# Patient Record
Sex: FEMALE | Race: OTHER | Employment: FULL TIME | URBAN - METROPOLITAN AREA
[De-identification: names, ages, dates, MRNs, and addresses within clinical notes are randomized per-mention and may not be internally consistent; named-entity substitution may affect disease eponyms.]

---

## 2017-01-06 PROCEDURE — 80050 GENERAL HEALTH PANEL: CPT | Performed by: INTERNAL MEDICINE

## 2017-01-06 PROCEDURE — 87536 HIV-1 QUANT&REVRSE TRNSCRPJ: CPT | Performed by: INTERNAL MEDICINE

## 2017-01-06 PROCEDURE — 86360 T CELL ABSOLUTE COUNT/RATIO: CPT | Performed by: INTERNAL MEDICINE

## 2017-01-06 PROCEDURE — 36415 COLL VENOUS BLD VENIPUNCTURE: CPT | Performed by: INTERNAL MEDICINE

## 2017-02-17 ENCOUNTER — TELEPHONE (OUTPATIENT)
Dept: ALLERGY | Facility: CLINIC | Age: 67
End: 2017-02-17

## 2017-02-17 DIAGNOSIS — E03.9 HYPOTHYROIDISM, UNSPECIFIED TYPE: Primary | ICD-10-CM

## 2017-02-17 RX ORDER — LEVOTHYROXINE SODIUM 0.07 MG/1
TABLET ORAL
Qty: 90 TABLET | Refills: 3 | Status: SHIPPED | OUTPATIENT
Start: 2017-02-17 | End: 2017-03-24

## 2017-02-18 RX ORDER — VERAPAMIL HCL 80 MG
TABLET ORAL
Qty: 1 INHALER | Refills: 2 | Status: SHIPPED | OUTPATIENT
Start: 2017-02-18 | End: 2017-07-15

## 2017-02-18 NOTE — TELEPHONE ENCOUNTER
Spoke with patient, reports does not have a printer, so would like to  coupon card. I am leaving it up at  for her, she is planning to pick it up on 2/20/17 before 7pm.   No further questions or concerns at this time.

## 2017-02-18 NOTE — TELEPHONE ENCOUNTER
Refill requested for:    Beclomethasone Dipropionate (QVAR) 80 MCG/ACT Inhalation Aero Soln 1 Inhaler 2 8/6/2016       Sig :  Inhale 2 puffs (0.16 mg total) into the lungs daily.       Patient taking differently :  Inhale 2 puffs into the lungs 2 (two) tejas

## 2017-02-18 NOTE — TELEPHONE ENCOUNTER
Informed patient that her medication was refilled x 3 months and sent to pharmacy. Scheduled for 4/29/17 @ 10:45 AM. She verbalized understanding.

## 2017-03-24 DIAGNOSIS — E03.9 HYPOTHYROIDISM, UNSPECIFIED TYPE: ICD-10-CM

## 2017-03-24 RX ORDER — LEVOTHYROXINE SODIUM 0.07 MG/1
75 TABLET ORAL
Qty: 90 TABLET | Refills: 3 | Status: SHIPPED | OUTPATIENT
Start: 2017-03-24 | End: 2018-03-10

## 2017-03-24 NOTE — TELEPHONE ENCOUNTER
From: Sharri Barker  To:  Faby Urena MD  Sent: 3/24/2017 3:19 PM CDT  Subject: Medication Renewal Request    Original authorizing provider: MD Sharri Amaya would like a refill of the following medications:  LEVOTHYROXINE SODIUM

## 2017-06-19 ENCOUNTER — TELEPHONE (OUTPATIENT)
Dept: ALLERGY | Facility: CLINIC | Age: 67
End: 2017-06-19

## 2017-06-19 NOTE — TELEPHONE ENCOUNTER
Call reviewed and noted. Please have patient try hydrocortisone twice a day over the next 1 week. Continue with Xyzal on a daily basis and Benadryl as needed. If no improvement over the next week or if worsening then recommend to be seen by a physician.

## 2017-06-19 NOTE — TELEPHONE ENCOUNTER
General Telephone Call to Triage:    Last office visit (Rafa Hernandez):  11/16/16. Due for f/u:  Please clarify.     _____________________________________________________________________    ACTION:  According to previous testing, patient with known allergies to mold

## 2017-06-19 NOTE — TELEPHONE ENCOUNTER
Pt states that she has itchy hives on her fore arm. Pt states that they started around a week ago and would like to know what doctor recommends.

## 2017-06-19 NOTE — TELEPHONE ENCOUNTER
Patient returned call, c/o itchy rash to elbow crease area of L arm. It does not extend beyond that point. The only trigger she can identify as a possibility is when she was carrying a reusable shopping bag in that spot of her arm.   This started 1 week a

## 2017-06-20 NOTE — TELEPHONE ENCOUNTER
Left detailed message, as requested by patient earlier today. I notified her of Dr. Kerrie Zheng message as written below. Please call with any further questions or concerns.

## 2017-06-28 ENCOUNTER — PATIENT MESSAGE (OUTPATIENT)
Dept: INTERNAL MEDICINE CLINIC | Facility: CLINIC | Age: 67
End: 2017-06-28

## 2017-06-28 NOTE — TELEPHONE ENCOUNTER
From: Az Moran  To: Pancho Merchant MD  Sent: 6/28/2017 9:37 AM CDT  Subject: Other    would like to make an appointment with Dr Slade Smith.  Hopefully this Saturday

## 2017-07-11 ENCOUNTER — LAB ENCOUNTER (OUTPATIENT)
Dept: LAB | Facility: HOSPITAL | Age: 67
End: 2017-07-11
Attending: INTERNAL MEDICINE
Payer: MEDICARE

## 2017-07-11 DIAGNOSIS — Z23 NEED FOR PROPHYLACTIC VACCINATION AND INOCULATION AGAINST VIRAL HEPATITIS: ICD-10-CM

## 2017-07-11 DIAGNOSIS — E03.9 HYPOTHYROIDISM, UNSPECIFIED TYPE: ICD-10-CM

## 2017-07-11 DIAGNOSIS — B20 HIV DISEASE (HCC): ICD-10-CM

## 2017-07-11 DIAGNOSIS — E78.5 HYPERLIPIDEMIA, UNSPECIFIED HYPERLIPIDEMIA TYPE: ICD-10-CM

## 2017-07-11 LAB
ALBUMIN SERPL BCP-MCNC: 4.1 G/DL (ref 3.5–4.8)
ALBUMIN/GLOB SERPL: 1.6 {RATIO} (ref 1–2)
ALP SERPL-CCNC: 77 U/L (ref 32–100)
ALT SERPL-CCNC: 17 U/L (ref 14–54)
ANION GAP SERPL CALC-SCNC: 5 MMOL/L (ref 0–18)
AST SERPL-CCNC: 18 U/L (ref 15–41)
BASOPHILS # BLD: 0 K/UL (ref 0–0.2)
BASOPHILS NFR BLD: 1 %
BILIRUB SERPL-MCNC: 0.6 MG/DL (ref 0.3–1.2)
BUN SERPL-MCNC: 11 MG/DL (ref 8–20)
BUN/CREAT SERPL: 17.7 (ref 10–20)
CALCIUM SERPL-MCNC: 9.5 MG/DL (ref 8.5–10.5)
CD3+CD4+ CELLS # BLD: 240 /MM3
CD3+CD4+ CELLS NFR BLD: 24 %
CD3+CD4+ CELLS/CD3+CD8+ CLL SPEC: 0.5
CD3+CD8+ CELLS NFR SPEC: 45 %
CHLORIDE SERPL-SCNC: 107 MMOL/L (ref 95–110)
CHOLEST SERPL-MCNC: 232 MG/DL (ref 110–200)
CO2 SERPL-SCNC: 30 MMOL/L (ref 22–32)
CREAT SERPL-MCNC: 0.62 MG/DL (ref 0.5–1.5)
EOSINOPHIL # BLD: 0 K/UL (ref 0–0.7)
EOSINOPHIL NFR BLD: 1 %
ERYTHROCYTE [DISTWIDTH] IN BLOOD BY AUTOMATED COUNT: 13 % (ref 11–15)
GLOBULIN PLAS-MCNC: 2.5 G/DL (ref 2.5–3.7)
GLUCOSE SERPL-MCNC: 87 MG/DL (ref 70–99)
HCT VFR BLD AUTO: 40.7 % (ref 35–48)
HDLC SERPL-MCNC: 61 MG/DL
HGB BLD-MCNC: 13.8 G/DL (ref 12–16)
LDLC SERPL CALC-MCNC: 147 MG/DL (ref 0–99)
LYMPHOCYTES # BLD: 1 K/UL (ref 1–4)
LYMPHOCYTES NFR BLD: 25 %
MCH RBC QN AUTO: 29.9 PG (ref 27–32)
MCHC RBC AUTO-ENTMCNC: 34 G/DL (ref 32–37)
MCV RBC AUTO: 87.8 FL (ref 80–100)
MONOCYTES # BLD: 0.4 K/UL (ref 0–1)
MONOCYTES NFR BLD: 10 %
NEUTROPHILS # BLD AUTO: 2.5 K/UL (ref 1.8–7.7)
NEUTROPHILS NFR BLD: 63 %
NONHDLC SERPL-MCNC: 171 MG/DL
OSMOLALITY UR CALC.SUM OF ELEC: 293 MOSM/KG (ref 275–295)
PLATELET # BLD AUTO: 171 K/UL (ref 140–400)
PMV BLD AUTO: 9.4 FL (ref 7.4–10.3)
POTASSIUM SERPL-SCNC: 4.2 MMOL/L (ref 3.3–5.1)
PROT SERPL-MCNC: 6.6 G/DL (ref 5.9–8.4)
RBC # BLD AUTO: 4.64 M/UL (ref 3.7–5.4)
SODIUM SERPL-SCNC: 142 MMOL/L (ref 136–144)
TRIGL SERPL-MCNC: 122 MG/DL (ref 1–149)
TSH SERPL-ACNC: 1.55 UIU/ML (ref 0.45–5.33)
WBC # BLD AUTO: 4 K/UL (ref 4–11)

## 2017-07-11 PROCEDURE — 80061 LIPID PANEL: CPT

## 2017-07-11 PROCEDURE — 36415 COLL VENOUS BLD VENIPUNCTURE: CPT

## 2017-07-11 PROCEDURE — 85025 COMPLETE CBC W/AUTO DIFF WBC: CPT

## 2017-07-11 PROCEDURE — 84443 ASSAY THYROID STIM HORMONE: CPT

## 2017-07-11 PROCEDURE — 87536 HIV-1 QUANT&REVRSE TRNSCRPJ: CPT

## 2017-07-11 PROCEDURE — 80053 COMPREHEN METABOLIC PANEL: CPT

## 2017-07-11 PROCEDURE — 86780 TREPONEMA PALLIDUM: CPT

## 2017-07-11 PROCEDURE — 86360 T CELL ABSOLUTE COUNT/RATIO: CPT

## 2017-07-12 LAB — T PALLIDUM AB SER QL: NEGATIVE

## 2017-07-13 LAB
HIV-1 QNT BY NAAT (COPIES/ML): <20 CPY/ML
HIV-1 QNT BY NAAT (LOG COPIES/ML): <1.3 LOG
HIV-1 QNT BY NAAT INTERP: NOT DETECTED

## 2017-07-13 RX ORDER — VERAPAMIL HCL 80 MG
TABLET ORAL
Qty: 8.7 G | Refills: 0 | Status: SHIPPED | OUTPATIENT
Start: 2017-07-13 | End: 2017-07-15

## 2017-07-13 NOTE — TELEPHONE ENCOUNTER
Left message for patient that her medication was refilled and sent to pharmacy. Appointment on July 15, 2017 @ 11:15 AM. Any additional questions to please call the office.

## 2017-07-13 NOTE — TELEPHONE ENCOUNTER
Received refill request for:    Medication Quantity Refills Start End   QVAR 80 MCG/ACT Inhalation Aero Soln 1 Inhaler 2 2/18/2017    Sig :  INHALE 2 PUFFS INTO THE LUNGS DAILY       LOV: 11/16/2016                            Scheduled Appointment: 6 month

## 2017-07-15 ENCOUNTER — OFFICE VISIT (OUTPATIENT)
Dept: ALLERGY | Facility: CLINIC | Age: 67
End: 2017-07-15

## 2017-07-15 VITALS
SYSTOLIC BLOOD PRESSURE: 115 MMHG | RESPIRATION RATE: 18 BRPM | DIASTOLIC BLOOD PRESSURE: 80 MMHG | HEART RATE: 77 BPM | TEMPERATURE: 98 F | OXYGEN SATURATION: 98 % | HEIGHT: 58 IN | BODY MASS INDEX: 26.66 KG/M2 | WEIGHT: 127 LBS

## 2017-07-15 DIAGNOSIS — J45.909 ASTHMA, EXTRINSIC, UNSPECIFIED ASTHMA SEVERITY, UNCOMPLICATED: Primary | ICD-10-CM

## 2017-07-15 DIAGNOSIS — J30.81 ALLERGIC RHINITIS DUE TO ANIMAL DANDER: ICD-10-CM

## 2017-07-15 DIAGNOSIS — J30.89 ALLERGIC RHINITIS DUE TO DUST MITE: ICD-10-CM

## 2017-07-15 PROCEDURE — 90670 PCV13 VACCINE IM: CPT | Performed by: ALLERGY & IMMUNOLOGY

## 2017-07-15 PROCEDURE — G0009 ADMIN PNEUMOCOCCAL VACCINE: HCPCS | Performed by: ALLERGY & IMMUNOLOGY

## 2017-07-15 PROCEDURE — G0463 HOSPITAL OUTPT CLINIC VISIT: HCPCS | Performed by: ALLERGY & IMMUNOLOGY

## 2017-07-15 PROCEDURE — 94010 BREATHING CAPACITY TEST: CPT | Performed by: ALLERGY & IMMUNOLOGY

## 2017-07-15 PROCEDURE — 99214 OFFICE O/P EST MOD 30 MIN: CPT | Performed by: ALLERGY & IMMUNOLOGY

## 2017-07-15 NOTE — PROGRESS NOTES
Zahida Alvarado is a 74year old female. HPI:   Patient presents with: Allergies    Patient is a 29-year-old female who presents for follow-up with a chief complaint of allergies and asthma.     Patient last seen by me on November 16, 2016    Patient w Tab TAKE 1 TABLET BY MOUTH DAILY Disp: 30 tablet Rfl: 0   TRUVADA 200-300 MG Oral Tab TAKE 1 TABLET BY MOUTH DAILY Disp: 30 tablet Rfl: 0   Levothyroxine Sodium 75 MCG Oral Tab Take 1 tablet (75 mcg total) by mouth before breakfast. Disp: 90 tablet Rfl: 3 grossly intact  Nose/Mouth/Throat: nose and throat are clear mucous membranes are moist   Neck/Thyroid: neck is supple without adenopathy  Lymphatic: no abnormal cervical, supraclavicular or axillary adenopathy is noted  Respiratory: normal to inspection l ordered in this encounter    Imaging & Referrals:  None     7/15/2017  Katie Porter MD    If medication samples were provided today, they were provided solely for patient education and training related to self administration of these medications.   Tea

## 2017-07-22 ENCOUNTER — OFFICE VISIT (OUTPATIENT)
Dept: INTERNAL MEDICINE CLINIC | Facility: CLINIC | Age: 67
End: 2017-07-22

## 2017-07-22 VITALS
OXYGEN SATURATION: 100 % | BODY MASS INDEX: 26.62 KG/M2 | TEMPERATURE: 99 F | WEIGHT: 126.81 LBS | DIASTOLIC BLOOD PRESSURE: 64 MMHG | HEIGHT: 58 IN | SYSTOLIC BLOOD PRESSURE: 116 MMHG | HEART RATE: 69 BPM

## 2017-07-22 DIAGNOSIS — Z12.11 SCREENING FOR COLON CANCER: ICD-10-CM

## 2017-07-22 DIAGNOSIS — E78.5 HYPERLIPIDEMIA, UNSPECIFIED HYPERLIPIDEMIA TYPE: ICD-10-CM

## 2017-07-22 DIAGNOSIS — K59.00 CONSTIPATION, UNSPECIFIED CONSTIPATION TYPE: ICD-10-CM

## 2017-07-22 DIAGNOSIS — E03.9 HYPOTHYROIDISM, UNSPECIFIED TYPE: ICD-10-CM

## 2017-07-22 DIAGNOSIS — R14.0 ABDOMINAL BLOATING: Primary | ICD-10-CM

## 2017-07-22 LAB — IGA SERPL-MCNC: 276 MG/DL (ref 68–378)

## 2017-07-22 PROCEDURE — 99214 OFFICE O/P EST MOD 30 MIN: CPT | Performed by: INTERNAL MEDICINE

## 2017-07-22 PROCEDURE — 82784 ASSAY IGA/IGD/IGG/IGM EACH: CPT | Performed by: INTERNAL MEDICINE

## 2017-07-22 PROCEDURE — 36415 COLL VENOUS BLD VENIPUNCTURE: CPT | Performed by: INTERNAL MEDICINE

## 2017-07-22 PROCEDURE — 83516 IMMUNOASSAY NONANTIBODY: CPT | Performed by: INTERNAL MEDICINE

## 2017-07-22 NOTE — PROGRESS NOTES
Shannon Dawkins is a 74year old female.     Chief complaint:follow up on thyroid, HL, constipation and abdominal bloating     HPI:       74year old female with PMH as listed below here for follow up on thyroid   Hypothyroidism   Recent labs wnl   On levo Past Surgical History:  No date:      Social History:  Smoking status: Never Smoker                                                              Comment: No household smokers. Alcohol use:  Yes              Comment: occasional       [facility] if you are having persistent or worsening symptoms   Familia Garcia MD,   Diplomate of the American Board of Internal Medicine  Diplomate of the American Board of Obesity Medicine

## 2017-07-22 NOTE — PATIENT INSTRUCTIONS
Constipation (Adult)  Constipation means that you have bowel movements that are less frequent than usual. Stools often become very hard and difficult to pass. Constipation is very common. At some point in life it affects almost everyone.  Since everyone' All treatment should be done after talking with your healthcare provider. This is especially true if you have another medical problems, are taking prescription medicines, or are an older adult. Treatment most often involves lifestyle changes.  You may also · Pain in your abdomen or back gets worse  · Nausea or vomiting  · Swelling in your abdomen  · Blood in the stool  · Black, tarry stool  · Involuntary weight loss  · Weakness  Date Last Reviewed: 12/30/2015  © 6143-3736 [employer] Your healthcare provider may suggest an over-the-counter product to help ease your constipation. He or she may suggest the use of bulk-forming agents or laxatives. The use of laxatives, if used as directed, is common and safe.  Follow directions carefully w

## 2017-07-26 LAB — TTG IGA SER-ACNC: 0.4 U/ML (ref ?–7)

## 2017-08-30 ENCOUNTER — OFFICE VISIT (OUTPATIENT)
Dept: INTERNAL MEDICINE CLINIC | Facility: CLINIC | Age: 67
End: 2017-08-30

## 2017-08-30 VITALS
BODY MASS INDEX: 26.24 KG/M2 | SYSTOLIC BLOOD PRESSURE: 132 MMHG | HEIGHT: 58 IN | HEART RATE: 68 BPM | TEMPERATURE: 98 F | WEIGHT: 125 LBS | OXYGEN SATURATION: 99 % | DIASTOLIC BLOOD PRESSURE: 82 MMHG

## 2017-08-30 DIAGNOSIS — R11.0 NAUSEA: ICD-10-CM

## 2017-08-30 DIAGNOSIS — E03.9 HYPOTHYROIDISM, UNSPECIFIED TYPE: ICD-10-CM

## 2017-08-30 DIAGNOSIS — R10.13 EPIGASTRIC PAIN: Primary | ICD-10-CM

## 2017-08-30 LAB
ALBUMIN SERPL BCP-MCNC: 4.4 G/DL (ref 3.5–4.8)
ALP SERPL-CCNC: 67 U/L (ref 32–100)
ALT SERPL-CCNC: 17 U/L (ref 14–54)
AMYLASE SERPL-CCNC: 65 U/L (ref 24–108)
AST SERPL-CCNC: 20 U/L (ref 15–41)
BASOPHILS # BLD: 0 K/UL (ref 0–0.2)
BASOPHILS NFR BLD: 1 %
BILIRUB DIRECT SERPL-MCNC: 0 MG/DL (ref 0–0.2)
BILIRUB SERPL-MCNC: 0.5 MG/DL (ref 0.3–1.2)
EOSINOPHIL # BLD: 0 K/UL (ref 0–0.7)
EOSINOPHIL NFR BLD: 1 %
ERYTHROCYTE [DISTWIDTH] IN BLOOD BY AUTOMATED COUNT: 13.5 % (ref 11–15)
HCT VFR BLD AUTO: 44.1 % (ref 35–48)
HGB BLD-MCNC: 14.5 G/DL (ref 12–16)
LIPASE SERPL-CCNC: 22 U/L (ref 22–51)
LYMPHOCYTES # BLD: 1.2 K/UL (ref 1–4)
LYMPHOCYTES NFR BLD: 26 %
MCH RBC QN AUTO: 29.5 PG (ref 27–32)
MCHC RBC AUTO-ENTMCNC: 32.9 G/DL (ref 32–37)
MCV RBC AUTO: 89.6 FL (ref 80–100)
MONOCYTES # BLD: 0.4 K/UL (ref 0–1)
MONOCYTES NFR BLD: 8 %
NEUTROPHILS # BLD AUTO: 3 K/UL (ref 1.8–7.7)
NEUTROPHILS NFR BLD: 65 %
PLATELET # BLD AUTO: 184 K/UL (ref 140–400)
PMV BLD AUTO: 10.4 FL (ref 7.4–10.3)
PROT SERPL-MCNC: 7.1 G/DL (ref 5.9–8.4)
RBC # BLD AUTO: 4.92 M/UL (ref 3.7–5.4)
WBC # BLD AUTO: 4.6 K/UL (ref 4–11)

## 2017-08-30 PROCEDURE — 83690 ASSAY OF LIPASE: CPT | Performed by: INTERNAL MEDICINE

## 2017-08-30 PROCEDURE — 80076 HEPATIC FUNCTION PANEL: CPT | Performed by: INTERNAL MEDICINE

## 2017-08-30 PROCEDURE — 99214 OFFICE O/P EST MOD 30 MIN: CPT | Performed by: INTERNAL MEDICINE

## 2017-08-30 PROCEDURE — 85025 COMPLETE CBC W/AUTO DIFF WBC: CPT | Performed by: INTERNAL MEDICINE

## 2017-08-30 PROCEDURE — 82150 ASSAY OF AMYLASE: CPT | Performed by: INTERNAL MEDICINE

## 2017-08-30 PROCEDURE — 36415 COLL VENOUS BLD VENIPUNCTURE: CPT | Performed by: INTERNAL MEDICINE

## 2017-08-30 RX ORDER — OMEPRAZOLE 40 MG/1
40 CAPSULE, DELAYED RELEASE ORAL DAILY
Qty: 60 CAPSULE | Refills: 1 | Status: SHIPPED | OUTPATIENT
Start: 2017-08-30 | End: 2017-10-29

## 2017-08-30 NOTE — PROGRESS NOTES
Filomena Foster is a 74year old female.     Chief complaint: epigastric / LUQ pain    HPI:       74year old female with PMH as listed below here for   LUQ/ epigastric pain   Started   Last week   On and off   Burping +  Nausea no vomiting   Hurts when sh occasional       Family History   Problem Relation Age of Onset   • Hypertension Father    • Diabetes Sister      Patient Active Problem List:     Allergic rhinitis due to other allergen     HIV disease ([facility] [address]     Hypothyroidism, unspecified type      REVIE

## 2017-09-08 ENCOUNTER — TELEPHONE (OUTPATIENT)
Dept: GASTROENTEROLOGY | Facility: CLINIC | Age: 67
End: 2017-09-08

## 2017-09-08 ENCOUNTER — OFFICE VISIT (OUTPATIENT)
Dept: GASTROENTEROLOGY | Facility: CLINIC | Age: 67
End: 2017-09-08

## 2017-09-08 VITALS
BODY MASS INDEX: 27.18 KG/M2 | SYSTOLIC BLOOD PRESSURE: 121 MMHG | HEART RATE: 68 BPM | DIASTOLIC BLOOD PRESSURE: 79 MMHG | HEIGHT: 57 IN | WEIGHT: 126 LBS

## 2017-09-08 DIAGNOSIS — R12 HEARTBURN: ICD-10-CM

## 2017-09-08 DIAGNOSIS — Z12.11 COLON CANCER SCREENING: Primary | ICD-10-CM

## 2017-09-08 DIAGNOSIS — Z12.11 ENCOUNTER FOR SCREENING COLONOSCOPY: Primary | ICD-10-CM

## 2017-09-08 PROCEDURE — 99203 OFFICE O/P NEW LOW 30 MIN: CPT | Performed by: PHYSICIAN ASSISTANT

## 2017-09-08 PROCEDURE — G0463 HOSPITAL OUTPT CLINIC VISIT: HCPCS | Performed by: PHYSICIAN ASSISTANT

## 2017-09-08 RX ORDER — POLYETHYLENE GLYCOL 3350, SODIUM CHLORIDE, SODIUM BICARBONATE, POTASSIUM CHLORIDE 420; 11.2; 5.72; 1.48 G/4L; G/4L; G/4L; G/4L
POWDER, FOR SOLUTION ORAL
Qty: 1 BOTTLE | Refills: 0 | Status: SHIPPED | OUTPATIENT
Start: 2017-09-08 | End: 2018-01-17

## 2017-09-08 NOTE — PATIENT INSTRUCTIONS
1. Schedule colonoscopy with Dr. Laird Lienathalie with IV twilight sedation     2.  bowel prep from pharmacy - Colyte split dose preparation    3. Continue all medications for procedure    4. Read all bowel prep instructions carefully    5.  AVOID seeds, nuts, p

## 2017-09-08 NOTE — H&P
[address] [facility] [address] Gastroenterology                                                                                                  Clinic History and Physical     Pa 40 years ago which was negative  - No prior c-scope     Social Hx:  - No smoking/Rare ETOH  - Denies illicit drug use   - Occupation: [employer] alone   - Rare NSAIDs/no daily ASA    History, Medications, Allergies, ROS:      Past Medical History hours as needed for Wheezing.  inhale 2 puff by inhalation route  every 4 - 6 hours as needed Disp: 1 Inhaler Rfl: 0   Levocetirizine Dihydrochloride 5 MG Oral Tab TAKE 1 TABLET BY MOUTH EVERY NIGHT AT BEDTIME Disp: 30 tablet Rfl: 5   Fluticasone Propionate Anna with history of HIV (on anti-viral therapy with undetectable load), thyroid disease, asthma and allergic rhinits, who presents for colon cancer screening evaluation. No prior colonoscopy. No anemia noted on most recent labs.  LFTs and pancreatic en indicated. Orders This Visit:  No orders of the defined types were placed in this encounter.       Meds This Visit:    Signed Prescriptions Disp Refills    PEG 3350-KCl-NaBcb-NaCl-NaSulf (COLYTE WITH FLAVOR PACKS) 227.1 g Oral Recon Soln 1 Bottle 0

## 2017-09-08 NOTE — TELEPHONE ENCOUNTER
Pharmacy is requesting an alternative for Rx COLYTE WITH FLAVOR PACKS due to medication not covered by insurance.  Fax:[phone] Thank You         Current Outpatient Prescriptions:   •  PEG 3350-KCl-NaBcb-NaCl-NaSulf (COLYTE WITH FLAVOR PACKS) 227.1 g O

## 2017-09-08 NOTE — TELEPHONE ENCOUNTER
Scheduled for:  Colonoscopy 28357  Provider Name: Dr Joycelyn Dandy  Date:  Fri 10/10/17  Location:  [facility]  Sedation:  IV  Time:  9:30 am  Prep: split dose colyte  Meds/Allergies Reconciled?:  NKDA  Diagnosis with codes:  Colon cancer screening Z12.11, heartburn R12  W

## 2017-09-08 NOTE — TELEPHONE ENCOUNTER
Contacted [facility] and spoke with [employer], she states the trilyte went through the insurance with a $3 copay.

## 2017-10-20 ENCOUNTER — SURGERY (OUTPATIENT)
Age: 67
End: 2017-10-20

## 2017-10-20 ENCOUNTER — HOSPITAL ENCOUNTER (OUTPATIENT)
Facility: HOSPITAL | Age: 67
Setting detail: HOSPITAL OUTPATIENT SURGERY
Discharge: HOME | End: 2017-10-20
Attending: INTERNAL MEDICINE | Admitting: INTERNAL MEDICINE
Payer: MEDICARE

## 2017-10-20 DIAGNOSIS — R12 HEARTBURN: ICD-10-CM

## 2017-10-20 DIAGNOSIS — Z12.11 COLON CANCER SCREENING: ICD-10-CM

## 2017-10-20 PROBLEM — K64.8 INTERNAL HEMORRHOIDS: Status: ACTIVE | Noted: 2017-10-20

## 2017-10-20 PROCEDURE — 0DJD8ZZ INSPECTION OF LOWER INTESTINAL TRACT, VIA NATURAL OR ARTIFICIAL OPENING ENDOSCOPIC: ICD-10-PCS | Performed by: INTERNAL MEDICINE

## 2017-10-20 RX ORDER — SODIUM CHLORIDE 0.9 % (FLUSH) 0.9 %
10 SYRINGE (ML) INJECTION AS NEEDED
Status: DISCONTINUED | OUTPATIENT
Start: 2017-10-20 | End: 2017-10-20

## 2017-10-20 RX ORDER — MIDAZOLAM HYDROCHLORIDE 1 MG/ML
1 INJECTION INTRAMUSCULAR; INTRAVENOUS EVERY 5 MIN PRN
Status: DISCONTINUED | OUTPATIENT
Start: 2017-10-20 | End: 2017-10-20

## 2017-10-20 RX ORDER — MIDAZOLAM HYDROCHLORIDE 1 MG/ML
INJECTION INTRAMUSCULAR; INTRAVENOUS
Status: DISCONTINUED | OUTPATIENT
Start: 2017-10-20 | End: 2017-10-20

## 2017-10-20 RX ORDER — SODIUM CHLORIDE, SODIUM LACTATE, POTASSIUM CHLORIDE, CALCIUM CHLORIDE 600; 310; 30; 20 MG/100ML; MG/100ML; MG/100ML; MG/100ML
INJECTION, SOLUTION INTRAVENOUS CONTINUOUS
Status: DISCONTINUED | OUTPATIENT
Start: 2017-10-20 | End: 2017-10-20

## 2017-10-20 NOTE — OPERATIVE REPORT
[facility]    PATIENT'S NAME: Rashid Stack   ATTENDING PHYSICIAN: Mylene Mccracken MD   OPERATING PHYSICIAN: Mylene Mccracken MD   PATIENT ACCOUNT#:   [id number]    LOCATION:  [facility] ROOM [address] [place] [address] Small internal hemorrhoids. RECOMMENDATION:  Next screening colonoscopy in 10 years unless other signs or symptoms develop in the interim.     Dictated By German Jones MD  d: 10/20/2017 10:04:21  t: 10/20/2017 14:54:53  Job [id number]

## 2017-10-20 NOTE — BRIEF OP NOTE
Pre-Operative Diagnosis: Colon cancer screening      Post-Operative Diagnosis: Normal colonoscopy, internal hemorrhoids     Procedure Performed:   Procedure(s):  COLONOSCOPY    Surgeon(s) and Role:     * Jessi Collado, [address] [place] [address]

## 2017-10-20 NOTE — H&P
History & Physical Examination    Patient Name: Cherie Lynn  MRN: [id number]  CSN: [id number]  YOB: 1950    Diagnosis: colorectal screening      Prescriptions Prior to Admission:  PEG 3350-KCl-Na [place]-NaCl (TRILYTE) 420 g Oral Recon So Intravenous Q5 Min PRN       Allergies: No Known Allergies    Past Medical History:   Diagnosis Date   • Allergic rhinitis    • Anemia    • Asthma    • HIV (human immunodeficiency virus infection) ([facility] [address]    • Thyroid disease      Past Surgical History:  No

## 2017-10-22 ENCOUNTER — HOSPITAL ENCOUNTER (EMERGENCY)
Facility: HOSPITAL | Age: 67
Discharge: HOME | End: 2017-10-22
Attending: EMERGENCY MEDICINE
Payer: MEDICARE

## 2017-10-22 ENCOUNTER — APPOINTMENT (OUTPATIENT)
Dept: GENERAL RADIOLOGY | Facility: HOSPITAL | Age: 67
End: 2017-10-22
Attending: EMERGENCY MEDICINE
Payer: MEDICARE

## 2017-10-22 ENCOUNTER — TELEPHONE (OUTPATIENT)
Dept: GASTROENTEROLOGY | Facility: CLINIC | Age: 67
End: 2017-10-22

## 2017-10-22 VITALS
HEART RATE: 80 BPM | WEIGHT: 125 LBS | SYSTOLIC BLOOD PRESSURE: 125 MMHG | BODY MASS INDEX: 26.97 KG/M2 | OXYGEN SATURATION: 97 % | HEIGHT: 57 IN | TEMPERATURE: 98 F | RESPIRATION RATE: 16 BRPM | DIASTOLIC BLOOD PRESSURE: 91 MMHG

## 2017-10-22 DIAGNOSIS — R53.83 OTHER FATIGUE: Primary | ICD-10-CM

## 2017-10-22 PROCEDURE — 99285 EMERGENCY DEPT VISIT HI MDM: CPT

## 2017-10-22 PROCEDURE — 36415 COLL VENOUS BLD VENIPUNCTURE: CPT

## 2017-10-22 PROCEDURE — 93005 ELECTROCARDIOGRAM TRACING: CPT

## 2017-10-22 PROCEDURE — 85025 COMPLETE CBC W/AUTO DIFF WBC: CPT | Performed by: EMERGENCY MEDICINE

## 2017-10-22 PROCEDURE — 81001 URINALYSIS AUTO W/SCOPE: CPT | Performed by: EMERGENCY MEDICINE

## 2017-10-22 PROCEDURE — 93010 ELECTROCARDIOGRAM REPORT: CPT | Performed by: EMERGENCY MEDICINE

## 2017-10-22 PROCEDURE — 71010 XR CHEST AP PORTABLE  (CPT=71010): CPT | Performed by: EMERGENCY MEDICINE

## 2017-10-22 PROCEDURE — 84484 ASSAY OF TROPONIN QUANT: CPT | Performed by: EMERGENCY MEDICINE

## 2017-10-22 PROCEDURE — 80048 BASIC METABOLIC PNL TOTAL CA: CPT | Performed by: EMERGENCY MEDICINE

## 2017-10-22 NOTE — ED INITIAL ASSESSMENT (HPI)
Pt to ED c/o chills, body aches and chest heaviness s/p colonoscopy 3 days ago.  Sent to ED by Dr. Garcia Lang

## 2017-10-22 NOTE — ED PROVIDER NOTES
Patient Seen in: [facility] [place] [facility]    History   Patient presents with: Body ache and/or chills    Stated Complaint:     HPI    Patient is a 30-year-old female who presents with generalized fatigue ×3 days.   Positive chills with no f all extremities, no cyanosis/clubbing/edema  Neuro: CN intact, normal speech, normal gait, 5/5 motor strength in all extremities, no focal deficits  SKIN: warm, dry, no rashes        ED Course     Labs Reviewed   BASIC METABOLIC PANEL (8) - Abnormal; Notab Prescribed:  Current Discharge Medication List

## 2017-10-22 NOTE — TELEPHONE ENCOUNTER
Pt contacted me on call - c/o chills and diaphoresis after colonoscopy on Friday- also feels lethargic and tired. Procedure reviewed with the pt - no polyps. She has no abdominal pain, no bowel movement or blood per rectum.    She does have asthma and f

## 2017-10-23 VITALS
RESPIRATION RATE: 16 BRPM | DIASTOLIC BLOOD PRESSURE: 80 MMHG | SYSTOLIC BLOOD PRESSURE: 124 MMHG | HEIGHT: 57 IN | HEART RATE: 81 BPM | WEIGHT: 127 LBS | OXYGEN SATURATION: 99 % | BODY MASS INDEX: 27.4 KG/M2

## 2017-10-23 RX ORDER — FLUTICASONE PROPIONATE 50 MCG
SPRAY, SUSPENSION (ML) NASAL
Qty: 3 BOTTLE | Refills: 0 | Status: SHIPPED | OUTPATIENT
Start: 2017-10-23 | End: 2018-02-15

## 2017-10-23 RX ORDER — FLUTICASONE PROPIONATE 50 MCG
SPRAY, SUSPENSION (ML) NASAL
Qty: 1 BOTTLE | Refills: 0 | Status: SHIPPED | OUTPATIENT
Start: 2017-10-23 | End: 2017-10-23

## 2017-10-23 NOTE — TELEPHONE ENCOUNTER
Refill requested for FLUTICASONE 50MCG NABILA SP(120SP) RX  Will file in chart as: FLUTICASONE PROPIONATE 50 MCG/ACT Nasal Suspension  SHAKE LIQUID AND USE 2 SPRAYS IN EACH NOSTRIL DAILY       Disp: Not specified (Pharmacy requested 48 mL)   Refills: 0     Class: Normal Start: 10/23/2017   Originally ordered: 2 years ago by Megan Greene MD  Last refill: 10/23/2017  To pharmacy: **Patient requests 90 days supply**    Last office visit: 7/15/2017    Previously advised to follow up in 6 months    F/U currently scheduled? No, pt is due in [facility] to be seen. Dr Lyndsay Miller refilled a 30 day supply earlier today. Pharmacy is requesting a 90 day supply. Date of last refill: 10/23/2017      ACTION: Routed to Dr. Lyndsay Miller for review.

## 2017-10-23 NOTE — TELEPHONE ENCOUNTER
Refill requested for FLUTICASONE 50MCG NABILA SP (120SP) RX  Will file in chart as: FLUTICASONE PROPIONATE 50 MCG/ACT Nasal Suspension  SHAKE WELL AND USE 2 SPRAYS IN EACH NOSTRIL DAILY       Disp: Not specified (Pharmacy requested 16 mL)   Refills: 0     Class: Normal Start: 10/23/2017   Originally ordered: 2 years ago by Samantha Muniz MD  Last refill: 12/29/2016    Last office visit: 5/17/17  Previously advised to follow up in Follow-up in 6 months or sooner if needed    F/U currently scheduled? No      Date of last refill: 12/29/2016      ACTION: Routed to Dr. Martin Dempsey for review.

## 2017-10-23 NOTE — TELEPHONE ENCOUNTER
I spoke with the patient. She had a normal screening colonoscopy on Friday. She had congestion yesterday. She is much better today without cough, fevers or chills. No nausea, vomiting or abdominal pain.   She had labs done including CBC which was normal

## 2017-10-23 NOTE — TELEPHONE ENCOUNTER
Call reviewed and noted. New prescription for Flonase for 3 month supply sent to patient's pharmacy.

## 2017-10-25 ENCOUNTER — OFFICE VISIT (OUTPATIENT)
Dept: INTERNAL MEDICINE CLINIC | Facility: CLINIC | Age: 67
End: 2017-10-25

## 2017-10-25 VITALS
SYSTOLIC BLOOD PRESSURE: 112 MMHG | HEIGHT: 57 IN | DIASTOLIC BLOOD PRESSURE: 68 MMHG | HEART RATE: 77 BPM | WEIGHT: 125.69 LBS | TEMPERATURE: 98 F | OXYGEN SATURATION: 98 % | BODY MASS INDEX: 27.12 KG/M2

## 2017-10-25 DIAGNOSIS — J01.90 ACUTE BACTERIAL SINUSITIS: Primary | ICD-10-CM

## 2017-10-25 DIAGNOSIS — B96.89 ACUTE BACTERIAL SINUSITIS: Primary | ICD-10-CM

## 2017-10-25 PROCEDURE — 99213 OFFICE O/P EST LOW 20 MIN: CPT | Performed by: INTERNAL MEDICINE

## 2017-10-25 RX ORDER — METHYLPREDNISOLONE 4 MG/1
TABLET ORAL
Qty: 1 KIT | Refills: 0 | Status: SHIPPED | OUTPATIENT
Start: 2017-10-25 | End: 2018-01-17

## 2017-10-25 RX ORDER — AMOXICILLIN AND CLAVULANATE POTASSIUM 875; 125 MG/1; MG/1
1 TABLET, FILM COATED ORAL 2 TIMES DAILY
Qty: 20 TABLET | Refills: 0 | Status: SHIPPED | OUTPATIENT
Start: 2017-10-25 | End: 2017-11-04

## 2017-10-25 NOTE — PROGRESS NOTES
Heaven Bryan is a 74year old female.     Chief complaint: chills, sinus congestion and cough    HPI:       74 year with PMH of HIV here for   Chills, sinus congestion   Started after the endoscopy   Sunday   No runny nose   No sore throat   no chest pa • Internal hemorrhoids 10/20/2017   • Thyroid disease      Past Surgical History:  No date:   10/20/2017: COLONOSCOPY N/A      Comment: Procedure: COLONOSCOPY;  Surgeon: Zhou Roa MD;  Location: [address] [place] [address] might not present with typical symptoms   Advised flu shot once she recovers  If no improvement in 3-4 days advised RTC   Please return to the clinic if you are having persistent or worsening symptoms   Tom Ricks MD,   Diplomate of the [employer]

## 2017-11-09 ENCOUNTER — PATIENT MESSAGE (OUTPATIENT)
Dept: INTERNAL MEDICINE CLINIC | Facility: CLINIC | Age: 67
End: 2017-11-09

## 2017-11-09 NOTE — TELEPHONE ENCOUNTER
From: Amado Walker  To: Radha Lucio MD  Sent: 11/9/2017 4:04 PM CST  Subject: Non-Urgent Medical Question    Does Dr. Sunitha Aguilar do pap smears?

## 2018-01-02 ENCOUNTER — TELEPHONE (OUTPATIENT)
Dept: INTERNAL MEDICINE CLINIC | Facility: CLINIC | Age: 68
End: 2018-01-02

## 2018-01-02 NOTE — TELEPHONE ENCOUNTER
Pt called to reschedule appt for today- requesting next available for a Saturday appt- Pt had an appt today for PAP but states her heat went out and repair team is coming this morning- wants first available Saturday this month- did not see any openings - s

## 2018-01-17 ENCOUNTER — OFFICE VISIT (OUTPATIENT)
Dept: INTERNAL MEDICINE CLINIC | Facility: CLINIC | Age: 68
End: 2018-01-17

## 2018-01-17 VITALS
HEART RATE: 77 BPM | OXYGEN SATURATION: 98 % | HEIGHT: 57 IN | DIASTOLIC BLOOD PRESSURE: 70 MMHG | BODY MASS INDEX: 26.75 KG/M2 | SYSTOLIC BLOOD PRESSURE: 130 MMHG | WEIGHT: 124 LBS | TEMPERATURE: 99 F

## 2018-01-17 DIAGNOSIS — Z23 NEED FOR VACCINATION: ICD-10-CM

## 2018-01-17 DIAGNOSIS — N95.9 POSTMENOPAUSAL SYMPTOMS: ICD-10-CM

## 2018-01-17 DIAGNOSIS — Z12.31 ENCOUNTER FOR SCREENING MAMMOGRAM FOR MALIGNANT NEOPLASM OF BREAST: ICD-10-CM

## 2018-01-17 DIAGNOSIS — E03.9 HYPOTHYROIDISM, UNSPECIFIED TYPE: ICD-10-CM

## 2018-01-17 DIAGNOSIS — Z00.00 MEDICARE ANNUAL WELLNESS VISIT, INITIAL: Primary | ICD-10-CM

## 2018-01-17 DIAGNOSIS — N81.89 OTHER FEMALE GENITAL PROLAPSE: ICD-10-CM

## 2018-01-17 DIAGNOSIS — Z23 NEED FOR INFLUENZA VACCINATION: ICD-10-CM

## 2018-01-17 DIAGNOSIS — Z51.81 THERAPEUTIC DRUG MONITORING: ICD-10-CM

## 2018-01-17 PROCEDURE — 88175 CYTOPATH C/V AUTO FLUID REDO: CPT | Performed by: INTERNAL MEDICINE

## 2018-01-17 PROCEDURE — G0403 EKG FOR INITIAL PREVENT EXAM: HCPCS | Performed by: INTERNAL MEDICINE

## 2018-01-17 PROCEDURE — 90714 TD VACC NO PRESV 7 YRS+ IM: CPT | Performed by: INTERNAL MEDICINE

## 2018-01-17 PROCEDURE — G0439 PPPS, SUBSEQ VISIT: HCPCS | Performed by: INTERNAL MEDICINE

## 2018-01-17 PROCEDURE — G0008 ADMIN INFLUENZA VIRUS VAC: HCPCS | Performed by: INTERNAL MEDICINE

## 2018-01-17 PROCEDURE — 90471 IMMUNIZATION ADMIN: CPT | Performed by: INTERNAL MEDICINE

## 2018-01-17 PROCEDURE — 90653 IIV ADJUVANT VACCINE IM: CPT | Performed by: INTERNAL MEDICINE

## 2018-01-17 RX ORDER — CLOTRIMAZOLE AND BETAMETHASONE DIPROPIONATE 10; .64 MG/G; MG/G
1 CREAM TOPICAL 2 TIMES DAILY
Qty: 15 G | Refills: 1 | Status: SHIPPED | OUTPATIENT
Start: 2018-01-17 | End: 2018-01-31

## 2018-01-17 NOTE — PROGRESS NOTES
TD 0.5ml administered to LD IM. VIS given to pt. Pt tolerated vaccine well    FLUAD 0.5ml administered to RD IM. VIS given to pt.  Pt tolerated vaccine well

## 2018-01-17 NOTE — PROGRESS NOTES
HPI:   Molly Calvo is a 74year old female who presents for a Medicare Annual Wellness visit.     Patient Active Problem List:     Allergic rhinitis due to other allergen     HIV disease ([facility] [address]     Hypothyroidism, unspecified type     Internal hemorrh Was Medicare Assessment Questionnaire completed by patient and sent to HIM for scanning? Yes    Please go to \"Cognitive Assessment\" under Medicare Assessment section in Charting, test patient and document.     Then, refresh your progress note to see your EM                ENDOSCOPY  No date: EGD      Comment: about 36 + yrs ago   Family History   Problem Relation Age of Onset   • Hypertension Father    • Diabetes Sister       SOCIAL HISTORY:   Smoking status: Never Smoker lymphadenopathy   Under the breast erythema, redness and scaliness  LUNGS: clear to auscultation  CARDIO: RRR without murmur  GI: good BS's, no masses, HSM or tenderness  : deferred  RECTAL: deferred  MUSCULOSKELETAL: back is not tender, FROM of the back REFERRAL TO UROGYNECOLOGY         CLINIC, COMP METABOLIC PANEL (14), LIPID PANEL,        TSH W REFLEX TO FREE T4, OPHTHALMOLOGY -         INTERNAL, clotrimazole-betamethasone 1-0.05 %         External Cream, THINPREP PAP WITH HPV REFLEX         REQUEST B, clotrimazole-betamethasone 1-0.05 %         External Cream, THINPREP PAP WITH HPV REFLEX         REQUEST B, FLU VACCINE ADJUVANT IM,         ELECTROCARDIOGRAM, COMPLETE, CANCELED: THINPREP        PAP WITH HPV REFLEX REQUEST B    (Z23) Need for influenza va Future  - LIPID PANEL; Future  - TSH W REFLEX TO FREE T4; Future  - OPHTHALMOLOGY - INTERNAL  - clotrimazole-betamethasone 1-0.05 % External Cream; Apply 1 Application topically 2 (two) times daily.   Dispense: 15 g; Refill: 1  - THINPREP PAP WITH HPV REFLE found for this or any previous visit.  Update Immunization Activity if applicable    Pneumococcal   Orders placed or performed in visit on 07/15/17  -PNEUMOCOCCAL VACC, 13 TEE IM    Update Immunization Activity if applicable    Hepatitis B No orders found f at high risk q1 year There are no preventive care reminders to display for this patient. Pap All Patients q2 year if indicated There are no preventive care reminders to display for this patient.    Mammogram Age > 39 q1 year Mammogram,1 Yr due on 01/02/20

## 2018-01-25 PROCEDURE — 87536 HIV-1 QUANT&REVRSE TRNSCRPJ: CPT | Performed by: INTERNAL MEDICINE

## 2018-01-25 PROCEDURE — 86360 T CELL ABSOLUTE COUNT/RATIO: CPT | Performed by: INTERNAL MEDICINE

## 2018-01-25 PROCEDURE — 86780 TREPONEMA PALLIDUM: CPT | Performed by: INTERNAL MEDICINE

## 2018-02-03 NOTE — TELEPHONE ENCOUNTER
Patient requesting refill for     Albuterol Sulfate  (90 BASE) MCG/ACT Inhalation Aero Soln Inhale 2 puffs into the lungs every 6 (six) hours as needed for Wheezing.  inhale 2 puff by inhalation route  every 4 - 6 hours as needed Disp: 1 Inhaler Rfl:

## 2018-02-05 RX ORDER — ALBUTEROL SULFATE 90 UG/1
2 AEROSOL, METERED RESPIRATORY (INHALATION) EVERY 6 HOURS PRN
Qty: 1 INHALER | Refills: 0 | Status: SHIPPED | OUTPATIENT
Start: 2018-02-05 | End: 2019-08-21

## 2018-02-05 NOTE — TELEPHONE ENCOUNTER
Refill requested for Patient requesting refill for      Albuterol Sulfate  (90 BASE) MCG/ACT Inhalation Aero Soln Inhale 2 puffs into the lungs every 6 (six) hours as needed for Wheezing.  inhale 2 puff by inhalation route  every 4 - 6 hours as neede

## 2018-02-05 NOTE — TELEPHONE ENCOUNTER
Albuterol loaded into meds & orders. Please review and sign. Forwarded to Dr. Vega Swift for review.

## 2018-02-05 NOTE — TELEPHONE ENCOUNTER
May refill albuterol ×1 in the interim. No further refills until seen in office.   Patient due for six-month follow-up

## 2018-02-10 RX ORDER — MIDAZOLAM HYDROCHLORIDE 1 MG/ML
1 INJECTION INTRAMUSCULAR; INTRAVENOUS EVERY 5 MIN PRN
Status: CANCELLED | OUTPATIENT
Start: 2018-02-10

## 2018-02-13 ENCOUNTER — HOSPITAL ENCOUNTER (OUTPATIENT)
Dept: BONE DENSITY | Facility: HOSPITAL | Age: 68
Discharge: HOME | End: 2018-02-13
Attending: INTERNAL MEDICINE
Payer: MEDICARE

## 2018-02-13 ENCOUNTER — HOSPITAL ENCOUNTER (OUTPATIENT)
Dept: MAMMOGRAPHY | Facility: HOSPITAL | Age: 68
Discharge: HOME | End: 2018-02-13
Attending: INTERNAL MEDICINE
Payer: MEDICARE

## 2018-02-13 DIAGNOSIS — Z51.81 THERAPEUTIC DRUG MONITORING: ICD-10-CM

## 2018-02-13 DIAGNOSIS — N95.9 POSTMENOPAUSAL SYMPTOMS: ICD-10-CM

## 2018-02-13 DIAGNOSIS — E03.9 HYPOTHYROIDISM, UNSPECIFIED TYPE: ICD-10-CM

## 2018-02-13 DIAGNOSIS — Z12.31 ENCOUNTER FOR SCREENING MAMMOGRAM FOR MALIGNANT NEOPLASM OF BREAST: ICD-10-CM

## 2018-02-13 DIAGNOSIS — Z23 NEED FOR INFLUENZA VACCINATION: ICD-10-CM

## 2018-02-13 DIAGNOSIS — N81.89 OTHER FEMALE GENITAL PROLAPSE: ICD-10-CM

## 2018-02-13 DIAGNOSIS — Z00.00 MEDICARE ANNUAL WELLNESS VISIT, INITIAL: ICD-10-CM

## 2018-02-13 PROCEDURE — 77080 DXA BONE DENSITY AXIAL: CPT | Performed by: INTERNAL MEDICINE

## 2018-02-13 PROCEDURE — 77067 SCR MAMMO BI INCL CAD: CPT | Performed by: INTERNAL MEDICINE

## 2018-02-15 RX ORDER — FLUTICASONE PROPIONATE 50 MCG
SPRAY, SUSPENSION (ML) NASAL
Refills: 0 | OUTPATIENT
Start: 2018-02-15

## 2018-02-15 RX ORDER — FLUTICASONE PROPIONATE 50 MCG
SPRAY, SUSPENSION (ML) NASAL
Qty: 1 BOTTLE | Refills: 0 | Status: SHIPPED | OUTPATIENT
Start: 2018-02-15 | End: 2019-01-16

## 2018-02-15 NOTE — TELEPHONE ENCOUNTER
Refill requested for FLUTICASONE 50MCG NASAL SP (120) RX  Will file in chart as: FLUTICASONE PROPIONATE 50 MCG/ACT Nasal Suspension  SHAKE LIQUID AND USE 2 SPRAYS IN EACH NOSTRIL DAILY       Disp: Not specified (Pharmacy requested 48 mL)   Refills: 0     C

## 2018-02-15 NOTE — TELEPHONE ENCOUNTER
Call reviewed and noted. Flonase refilled ×1 month. Patient due for six-month follow-up.   Please call to schedule

## 2018-02-15 NOTE — TELEPHONE ENCOUNTER
FLUTICASONE 50MCG NASAL SP (120) RX  Will file in chart as: FLUTICASONE PROPIONATE 50 MCG/ACT Nasal Suspension  SHAKE LIQUID AND USE 2 SPRAYS IN EACH NOSTRIL DAILY       Disp: Not specified (Pharmacy requested 48 mL)   Refills: 0     Class: Normal Start: 2

## 2018-02-15 NOTE — TELEPHONE ENCOUNTER
PHarmacy contacted and informed that only 30 day supply approved and per other 2/15/18 refill encounter pts daughter was informed that pt is due for OV for additional refills. No further action required.

## 2018-02-15 NOTE — TELEPHONE ENCOUNTER
Call reviewed and noted. Refill request for 90 day supply denied.   Patient was given a one-month prescription advised to follow-up within the month

## 2018-03-10 DIAGNOSIS — E03.9 HYPOTHYROIDISM, UNSPECIFIED TYPE: ICD-10-CM

## 2018-03-12 RX ORDER — LEVOTHYROXINE SODIUM 0.07 MG/1
TABLET ORAL
Qty: 90 TABLET | Refills: 0 | Status: SHIPPED | OUTPATIENT
Start: 2018-03-12 | End: 2018-06-13

## 2018-03-23 ENCOUNTER — SURGERY (OUTPATIENT)
Age: 68
End: 2018-03-23

## 2018-03-23 ENCOUNTER — HOSPITAL ENCOUNTER (OUTPATIENT)
Facility: HOSPITAL | Age: 68
Setting detail: HOSPITAL OUTPATIENT SURGERY
Discharge: HOME | End: 2018-03-23
Attending: INTERNAL MEDICINE | Admitting: INTERNAL MEDICINE
Payer: MEDICARE

## 2018-03-23 DIAGNOSIS — R10.13 EPIGASTRIC PAIN: ICD-10-CM

## 2018-03-23 PROCEDURE — 88305 TISSUE EXAM BY PATHOLOGIST: CPT | Performed by: INTERNAL MEDICINE

## 2018-03-23 PROCEDURE — 99152 MOD SED SAME PHYS/QHP 5/>YRS: CPT

## 2018-03-23 PROCEDURE — 88312 SPECIAL STAINS GROUP 1: CPT | Performed by: INTERNAL MEDICINE

## 2018-03-23 PROCEDURE — 0DB68ZX EXCISION OF STOMACH, VIA NATURAL OR ARTIFICIAL OPENING ENDOSCOPIC, DIAGNOSTIC: ICD-10-PCS | Performed by: INTERNAL MEDICINE

## 2018-03-23 RX ORDER — SODIUM CHLORIDE, SODIUM LACTATE, POTASSIUM CHLORIDE, CALCIUM CHLORIDE 600; 310; 30; 20 MG/100ML; MG/100ML; MG/100ML; MG/100ML
INJECTION, SOLUTION INTRAVENOUS CONTINUOUS
Status: DISCONTINUED | OUTPATIENT
Start: 2018-03-23 | End: 2018-03-23

## 2018-03-23 RX ORDER — SODIUM CHLORIDE, SODIUM LACTATE, POTASSIUM CHLORIDE, CALCIUM CHLORIDE 600; 310; 30; 20 MG/100ML; MG/100ML; MG/100ML; MG/100ML
INJECTION, SOLUTION INTRAVENOUS CONTINUOUS
Status: CANCELLED | OUTPATIENT
Start: 2018-03-23

## 2018-03-23 RX ORDER — MIDAZOLAM HYDROCHLORIDE 1 MG/ML
1 INJECTION INTRAMUSCULAR; INTRAVENOUS EVERY 5 MIN PRN
Status: DISCONTINUED | OUTPATIENT
Start: 2018-03-23 | End: 2018-03-23

## 2018-03-23 RX ORDER — SODIUM CHLORIDE 0.9 % (FLUSH) 0.9 %
10 SYRINGE (ML) INJECTION AS NEEDED
Status: DISCONTINUED | OUTPATIENT
Start: 2018-03-23 | End: 2018-03-23

## 2018-03-23 RX ORDER — SODIUM CHLORIDE 0.9 % (FLUSH) 0.9 %
10 SYRINGE (ML) INJECTION AS NEEDED
Status: CANCELLED | OUTPATIENT
Start: 2018-03-23

## 2018-03-23 RX ORDER — MIDAZOLAM HYDROCHLORIDE 1 MG/ML
INJECTION INTRAMUSCULAR; INTRAVENOUS
Status: DISCONTINUED | OUTPATIENT
Start: 2018-03-23 | End: 2018-03-23

## 2018-03-23 NOTE — H&P
PRE-PROCEDURE UPDATE    HPI: Anisha Ocasio is a 79year old female. 3/20/1950. Patient presents for an Esophagogastroduodenoscopy. ALLERGIES: No Known Allergies      No current outpatient prescriptions on file.   Past Medical History:   Diagnosis

## 2018-03-23 NOTE — OPERATIVE REPORT
ESOPHAGOGASTRODUODENOSCOPY REPORT    Patient Name:  Yadira Ca Record #: [id number]  YOB: 1950  Date of Procedure: 3/23/2018    Referring physician: Macarena Lui MD    Surgeon:  Iam Brooke.  Bernardo Cuadra MD    Pre-op diagnosis:

## 2018-03-24 VITALS
BODY MASS INDEX: 26.24 KG/M2 | WEIGHT: 125 LBS | HEART RATE: 70 BPM | HEIGHT: 58 IN | OXYGEN SATURATION: 98 % | SYSTOLIC BLOOD PRESSURE: 112 MMHG | RESPIRATION RATE: 18 BRPM | DIASTOLIC BLOOD PRESSURE: 69 MMHG

## 2018-04-06 DIAGNOSIS — J01.90 ACUTE BACTERIAL SINUSITIS: ICD-10-CM

## 2018-04-06 DIAGNOSIS — B96.89 ACUTE BACTERIAL SINUSITIS: ICD-10-CM

## 2018-04-07 ENCOUNTER — OFFICE VISIT (OUTPATIENT)
Dept: ALLERGY | Facility: CLINIC | Age: 68
End: 2018-04-07

## 2018-04-07 VITALS
SYSTOLIC BLOOD PRESSURE: 110 MMHG | TEMPERATURE: 97 F | OXYGEN SATURATION: 98 % | DIASTOLIC BLOOD PRESSURE: 62 MMHG | RESPIRATION RATE: 18 BRPM | HEART RATE: 78 BPM

## 2018-04-07 DIAGNOSIS — J30.81 ALLERGIC RHINITIS DUE TO ANIMAL DANDER: ICD-10-CM

## 2018-04-07 DIAGNOSIS — J30.89 ALLERGIC RHINITIS DUE TO DUST MITE: ICD-10-CM

## 2018-04-07 DIAGNOSIS — J45.909 ASTHMA, EXTRINSIC, UNSPECIFIED ASTHMA SEVERITY, UNCOMPLICATED: Primary | ICD-10-CM

## 2018-04-07 PROCEDURE — G0463 HOSPITAL OUTPT CLINIC VISIT: HCPCS | Performed by: ALLERGY & IMMUNOLOGY

## 2018-04-07 PROCEDURE — 94010 BREATHING CAPACITY TEST: CPT | Performed by: ALLERGY & IMMUNOLOGY

## 2018-04-07 PROCEDURE — 99214 OFFICE O/P EST MOD 30 MIN: CPT | Performed by: ALLERGY & IMMUNOLOGY

## 2018-04-07 NOTE — PROGRESS NOTES
Nir Saldaña is a 79year old female. HPI:   Patient presents with: Allergies: Allergy symptoms are good. No complaints    Patient is a 59-year-old female who presents for follow-up with a chief complaint of allergies.     Patient has a history of Nasal Suspension SHAKE LIQUID AND USE 2 SPRAYS IN EACH NOSTRIL DAILY Disp: 1 Bottle Rfl: 0   PREZCOBIX 800-150 MG Oral Tab TAKE 1 TABLET BY MOUTH DAILY Disp: 30 tablet Rfl: 5   TRUVADA 200-300 MG Oral Tab TAKE 1 TABLET BY MOUTH DAILY Disp: 30 tablet Rfl: 5 and rhythm no murmurs, gallups, or rubs  Abdomen: soft non-tender non-distended  Skin/Hair: no unusual rashes present   Extremities: no edema, cyanosis, or clubbing     ASSESSMENT/PLAN:   Assessment   Asthma, extrinsic, unspecified asthma severity, uncompl

## 2018-04-09 ENCOUNTER — TELEPHONE (OUTPATIENT)
Dept: INTERNAL MEDICINE CLINIC | Facility: CLINIC | Age: 68
End: 2018-04-09

## 2018-04-09 RX ORDER — METHYLPREDNISOLONE 4 MG/1
TABLET ORAL
Qty: 1 KIT | Refills: 0 | Status: SHIPPED | OUTPATIENT
Start: 2018-04-09 | End: 2018-05-26 | Stop reason: ALTCHOICE

## 2018-04-09 RX ORDER — METHYLPREDNISOLONE 4 MG/1
TABLET ORAL
Qty: 21 TABLET | Refills: 0 | OUTPATIENT
Start: 2018-04-09

## 2018-04-10 ENCOUNTER — PATIENT MESSAGE (OUTPATIENT)
Dept: ALLERGY | Facility: CLINIC | Age: 68
End: 2018-04-10

## 2018-04-10 NOTE — TELEPHONE ENCOUNTER
From: Radha Reyez  To: Sandy Salazar MD  Sent: 4/10/2018 8:09 AM CDT  Subject: Prescription Question    Good Morning. .... Dr. Nash Taylor was going to have my Qvar refilled but I have not heard from Mikey Enriquez.  Did he submit the request? Thank you

## 2018-04-10 NOTE — TELEPHONE ENCOUNTER
[phone] Giulia Noriega (W)  MY chart message and VM message left informing pt that new rx sent to pharmacy on 4/7/18.

## 2018-04-17 ENCOUNTER — PATIENT MESSAGE (OUTPATIENT)
Dept: ALLERGY | Facility: CLINIC | Age: 68
End: 2018-04-17

## 2018-04-17 NOTE — TELEPHONE ENCOUNTER
From: Saima Tellez  To: Rod Duval MD  Sent: 4/17/2018 11:29 AM CDT  Subject: Prescription Question    Good morning,    I spoke to [facility] prescription department. The closets to the Qvar they will cover is Flovent or Arnuity.  So if yo

## 2018-05-26 ENCOUNTER — OFFICE VISIT (OUTPATIENT)
Dept: INTERNAL MEDICINE CLINIC | Facility: CLINIC | Age: 68
End: 2018-05-26

## 2018-05-26 VITALS
BODY MASS INDEX: 26.75 KG/M2 | OXYGEN SATURATION: 98 % | RESPIRATION RATE: 19 BRPM | HEART RATE: 74 BPM | SYSTOLIC BLOOD PRESSURE: 122 MMHG | DIASTOLIC BLOOD PRESSURE: 60 MMHG | WEIGHT: 124 LBS | HEIGHT: 57 IN | TEMPERATURE: 98 F

## 2018-05-26 DIAGNOSIS — R51.9 ACUTE NONINTRACTABLE HEADACHE, UNSPECIFIED HEADACHE TYPE: ICD-10-CM

## 2018-05-26 DIAGNOSIS — R42 DIZZINESS: Primary | ICD-10-CM

## 2018-05-26 PROCEDURE — 99214 OFFICE O/P EST MOD 30 MIN: CPT | Performed by: INTERNAL MEDICINE

## 2018-05-26 PROCEDURE — 93000 ELECTROCARDIOGRAM COMPLETE: CPT | Performed by: INTERNAL MEDICINE

## 2018-05-26 NOTE — PROGRESS NOTES
Lior Chen is a 79year old female.     Chief complaint:dizziness     HPI:   79year old female with PMH as listed below here for   Dizziness   Dizzy couple of weeks   Can happen at any time   lightheaded   No vertigo       No chest pain no sob no p • HIV (human immunodeficiency virus infection) ([facility]    • Internal hemorrhoids 10/20/2017   • Thyroid disease    • Tinnitus      Past Surgical History:  No date:   10/20/2017: COLONOSCOPY N/A      Comment: Procedure: COLONOSCOPY;  Surgeon: Thu Whelan encounter.         ASSESSMENT AND PLAN:     (R42) Dizziness  (primary encounter diagnosis)  Plan: CT BRAIN OR HEAD (73062)    (R51) Acute nonintractable headache, unspecified headache type  Plan: CT BRAIN OR HEAD (92035)  No orthostatic hypotension   Advise

## 2018-05-29 ENCOUNTER — NURSE ONLY (OUTPATIENT)
Dept: INTERNAL MEDICINE CLINIC | Facility: CLINIC | Age: 68
End: 2018-05-29

## 2018-05-29 DIAGNOSIS — E03.9 HYPOTHYROIDISM, UNSPECIFIED TYPE: ICD-10-CM

## 2018-05-29 DIAGNOSIS — N81.89 OTHER FEMALE GENITAL PROLAPSE: ICD-10-CM

## 2018-05-29 DIAGNOSIS — Z51.81 THERAPEUTIC DRUG MONITORING: ICD-10-CM

## 2018-05-29 DIAGNOSIS — R42 DIZZINESS: ICD-10-CM

## 2018-05-29 DIAGNOSIS — Z00.00 MEDICARE ANNUAL WELLNESS VISIT, INITIAL: ICD-10-CM

## 2018-05-29 DIAGNOSIS — N95.9 POSTMENOPAUSAL SYMPTOMS: ICD-10-CM

## 2018-05-29 DIAGNOSIS — Z12.31 ENCOUNTER FOR SCREENING MAMMOGRAM FOR MALIGNANT NEOPLASM OF BREAST: ICD-10-CM

## 2018-05-29 DIAGNOSIS — R51.9 ACUTE NONINTRACTABLE HEADACHE, UNSPECIFIED HEADACHE TYPE: ICD-10-CM

## 2018-05-29 DIAGNOSIS — Z23 NEED FOR INFLUENZA VACCINATION: ICD-10-CM

## 2018-05-29 PROCEDURE — 84443 ASSAY THYROID STIM HORMONE: CPT | Performed by: INTERNAL MEDICINE

## 2018-05-29 PROCEDURE — 80061 LIPID PANEL: CPT | Performed by: INTERNAL MEDICINE

## 2018-05-29 PROCEDURE — 85025 COMPLETE CBC W/AUTO DIFF WBC: CPT | Performed by: INTERNAL MEDICINE

## 2018-05-29 PROCEDURE — 80053 COMPREHEN METABOLIC PANEL: CPT | Performed by: INTERNAL MEDICINE

## 2018-05-29 NOTE — PROGRESS NOTES
Pt's  verified  Pt presented for a non fasting blood draw. Per Dr Vincent Bella ordered [facility] CBC CMP LIPID. Pt tolerated venipuncture well,specimens drawn from RT  arm  and sent out to [address] [place] [address] lab for testing.

## 2018-05-31 ENCOUNTER — HOSPITAL ENCOUNTER (OUTPATIENT)
Dept: CT IMAGING | Facility: HOSPITAL | Age: 68
Discharge: HOME | End: 2018-05-31
Attending: INTERNAL MEDICINE
Payer: MEDICARE

## 2018-05-31 DIAGNOSIS — R51.9 ACUTE NONINTRACTABLE HEADACHE, UNSPECIFIED HEADACHE TYPE: ICD-10-CM

## 2018-05-31 DIAGNOSIS — R42 DIZZINESS: ICD-10-CM

## 2018-05-31 PROCEDURE — 70450 CT HEAD/BRAIN W/O DYE: CPT | Performed by: INTERNAL MEDICINE

## 2018-06-13 DIAGNOSIS — E03.9 HYPOTHYROIDISM, UNSPECIFIED TYPE: ICD-10-CM

## 2018-06-13 RX ORDER — LEVOTHYROXINE SODIUM 0.07 MG/1
TABLET ORAL
Qty: 90 TABLET | Refills: 0 | Status: SHIPPED | OUTPATIENT
Start: 2018-06-13 | End: 2018-09-06

## 2018-07-27 ENCOUNTER — TELEPHONE (OUTPATIENT)
Dept: ALLERGY | Facility: CLINIC | Age: 68
End: 2018-07-27

## 2018-07-27 RX ORDER — METAXALONE 800 MG/1
TABLET ORAL
Qty: 20 TABLET | Refills: 0 | Status: SHIPPED | OUTPATIENT
Start: 2018-07-27 | End: 2020-04-29

## 2018-07-28 RX ORDER — LEVOCETIRIZINE DIHYDROCHLORIDE 5 MG/1
TABLET, FILM COATED ORAL
Qty: 30 TABLET | Refills: 0 | Status: SHIPPED
Start: 2018-07-28 | End: 2019-10-31

## 2018-07-28 NOTE — TELEPHONE ENCOUNTER
From: Roxanne Both  Sent: 7/27/2018 8:46 AM CDT  Subject: Medication Renewal Request    ANGELA Restrepody would like a refill of the following medications:     Levocetirizine Dihydrochloride 5 MG Oral Tab Azul King MD]    Preferred pharmacy: [place] [id number] [address] AT [address] [place] [address], [phone], [phone]

## 2018-08-01 ENCOUNTER — OFFICE VISIT (OUTPATIENT)
Dept: INTERNAL MEDICINE CLINIC | Facility: CLINIC | Age: 68
End: 2018-08-01
Payer: MEDICARE

## 2018-08-01 ENCOUNTER — HOSPITAL ENCOUNTER (EMERGENCY)
Facility: HOSPITAL | Age: 68
Discharge: HOME | End: 2018-08-01
Attending: EMERGENCY MEDICINE
Payer: MEDICARE

## 2018-08-01 VITALS
RESPIRATION RATE: 14 BRPM | HEART RATE: 66 BPM | DIASTOLIC BLOOD PRESSURE: 83 MMHG | TEMPERATURE: 98 F | SYSTOLIC BLOOD PRESSURE: 145 MMHG | OXYGEN SATURATION: 99 %

## 2018-08-01 VITALS
TEMPERATURE: 98 F | BODY MASS INDEX: 26.75 KG/M2 | OXYGEN SATURATION: 97 % | HEART RATE: 81 BPM | HEIGHT: 57 IN | DIASTOLIC BLOOD PRESSURE: 60 MMHG | SYSTOLIC BLOOD PRESSURE: 122 MMHG | WEIGHT: 124 LBS | RESPIRATION RATE: 19 BRPM

## 2018-08-01 DIAGNOSIS — B20 HUMAN IMMUNODEFICIENCY VIRUS (HIV) DISEASE (HCC): ICD-10-CM

## 2018-08-01 DIAGNOSIS — H15.001 SCLERITIS OF RIGHT EYE: Primary | ICD-10-CM

## 2018-08-01 DIAGNOSIS — E03.9 ACQUIRED HYPOTHYROIDISM: ICD-10-CM

## 2018-08-01 DIAGNOSIS — Z01.818 PRE-OP EXAMINATION: Primary | ICD-10-CM

## 2018-08-01 DIAGNOSIS — J45.20 MILD INTERMITTENT ASTHMA WITHOUT COMPLICATION: ICD-10-CM

## 2018-08-01 DIAGNOSIS — T85.398A EXTRUSION OF SCLERAL BUCKLE, INITIAL ENCOUNTER: ICD-10-CM

## 2018-08-01 PROCEDURE — 99213 OFFICE O/P EST LOW 20 MIN: CPT | Performed by: INTERNAL MEDICINE

## 2018-08-01 PROCEDURE — 99282 EMERGENCY DEPT VISIT SF MDM: CPT

## 2018-08-01 RX ORDER — METOCLOPRAMIDE 10 MG/1
10 TABLET ORAL ONCE
Status: CANCELLED | OUTPATIENT
Start: 2018-08-01 | End: 2018-08-01

## 2018-08-01 RX ORDER — FAMOTIDINE 20 MG/1
20 TABLET ORAL ONCE
Status: CANCELLED | OUTPATIENT
Start: 2018-08-01 | End: 2018-08-01

## 2018-08-01 NOTE — ED INITIAL ASSESSMENT (HPI)
C/o right eye bleeding upon waking this morning. Pt states she has retinal detachment in right eye and had surgery in 2001, complains that she has always had somewhat blurred vision from that and her eye is frequently blood shot.  Upon waking this morning p

## 2018-08-01 NOTE — PROGRESS NOTES
[unfilled]    PRE OPERATIVE HISTORY AND PHYSICAL                                                                                                                                                      PATIENT:                                          Mik Caraballo of thyroid     hypothyroidism   • Diverticulosis    • Esophageal reflux    • History of blood transfusion 1993    [facility]   • [facility] immunodeficiency virus [facility]    • Internal hemorrhoids 10/20/2017   • Tinnitus    • Visual impai Main Topics    Smoking status: Never Smoker                                                                Smokeless tobacco: Never Used                        Alcohol use: Yes                Comment: occasional wine    Drug use:  No                Review o scleral Buckle  Hypothyroidism, stable   Mild intermittent asthma, well controlled with no flare  HIV, not immunosuppressed. Patient is low risk for the planned surgery.            Meghna Vega MD  8/1/2018 2:36 PM

## 2018-08-01 NOTE — ED PROVIDER NOTES
Patient Seen in: [facility] AND [facility] Emergency Department    History   Patient presents with: Eye Visual Problem (opthalmic)    Stated Complaint: right was bleeding.     HPI    Patient presents to the emergency department with complaint of noticed some blo Soln,  Inhale 2 puffs into the lungs every 6 (six) hours as needed for Wheezing.  inhale 2 puff by inhalation route  every 4 - 6 hours as needed   PREZCOBIX 800-150 MG Oral Tab,  TAKE 1 TABLET BY MOUTH DAILY   TRUVADA 200-300 MG Oral Tab,  TAKE 1 TABLET BY in the center which is whitish. Musculoskeletal:  Good muscle tone. Skin:  Warm, dry, well perfused. Good skin turgor. No rashes seen. Neurology:  Moving all extremities equally with good coordination. No cranial nerve asymmetry noted.   Psychiatric:

## 2018-08-02 ENCOUNTER — ANESTHESIA EVENT (OUTPATIENT)
Dept: SURGERY | Facility: HOSPITAL | Age: 68
End: 2018-08-02
Payer: MEDICARE

## 2018-08-02 ENCOUNTER — HOSPITAL ENCOUNTER (OUTPATIENT)
Facility: HOSPITAL | Age: 68
Setting detail: HOSPITAL OUTPATIENT SURGERY
Discharge: HOME | End: 2018-08-02
Attending: OPHTHALMOLOGY | Admitting: OPHTHALMOLOGY
Payer: MEDICARE

## 2018-08-02 ENCOUNTER — ANESTHESIA (OUTPATIENT)
Dept: SURGERY | Facility: HOSPITAL | Age: 68
End: 2018-08-02
Payer: MEDICARE

## 2018-08-02 VITALS
OXYGEN SATURATION: 98 % | HEART RATE: 62 BPM | TEMPERATURE: 98 F | BODY MASS INDEX: 25.4 KG/M2 | HEIGHT: 58 IN | SYSTOLIC BLOOD PRESSURE: 120 MMHG | WEIGHT: 121 LBS | RESPIRATION RATE: 18 BRPM | DIASTOLIC BLOOD PRESSURE: 65 MMHG

## 2018-08-02 PROCEDURE — 08P07YZ REMOVAL OF OTHER DEVICE FROM RIGHT EYE, VIA NATURAL OR ARTIFICIAL OPENING: ICD-10-PCS | Performed by: OPHTHALMOLOGY

## 2018-08-02 PROCEDURE — 88300 SURGICAL PATH GROSS: CPT | Performed by: OPHTHALMOLOGY

## 2018-08-02 RX ORDER — HYDROCODONE BITARTRATE AND ACETAMINOPHEN 5; 325 MG/1; MG/1
1 TABLET ORAL AS NEEDED
Status: COMPLETED | OUTPATIENT
Start: 2018-08-02 | End: 2018-08-02

## 2018-08-02 RX ORDER — ACETAMINOPHEN 500 MG
1000 TABLET ORAL ONCE
Status: COMPLETED | OUTPATIENT
Start: 2018-08-02 | End: 2018-08-02

## 2018-08-02 RX ORDER — SODIUM CHLORIDE, SODIUM LACTATE, POTASSIUM CHLORIDE, CALCIUM CHLORIDE 600; 310; 30; 20 MG/100ML; MG/100ML; MG/100ML; MG/100ML
INJECTION, SOLUTION INTRAVENOUS CONTINUOUS
Status: DISCONTINUED | OUTPATIENT
Start: 2018-08-02 | End: 2018-08-02

## 2018-08-02 RX ORDER — PHENYLEPHRINE HCL 2.5 %
2 DROPS OPHTHALMIC (EYE)
Status: ACTIVE | OUTPATIENT
Start: 2018-08-02 | End: 2018-08-02

## 2018-08-02 RX ORDER — ONDANSETRON 2 MG/ML
4 INJECTION INTRAMUSCULAR; INTRAVENOUS ONCE AS NEEDED
Status: DISCONTINUED | OUTPATIENT
Start: 2018-08-02 | End: 2018-08-02

## 2018-08-02 RX ORDER — GENTAMICIN SULFATE 3 MG/ML
2 SOLUTION/ DROPS OPHTHALMIC
Status: ACTIVE | OUTPATIENT
Start: 2018-08-02 | End: 2018-08-02

## 2018-08-02 RX ORDER — HOMATROPINE HYDROBROMIDE OPHTHALMIC 50 MG/ML
2 SOLUTION OPHTHALMIC
Status: ACTIVE | OUTPATIENT
Start: 2018-08-02 | End: 2018-08-02

## 2018-08-02 RX ORDER — BALANCED SALT SOLUTION 6.4; .75; .48; .3; 3.9; 1.7 MG/ML; MG/ML; MG/ML; MG/ML; MG/ML; MG/ML
SOLUTION OPHTHALMIC AS NEEDED
Status: DISCONTINUED | OUTPATIENT
Start: 2018-08-02 | End: 2018-08-02 | Stop reason: HOSPADM

## 2018-08-02 RX ORDER — NALOXONE HYDROCHLORIDE 0.4 MG/ML
80 INJECTION, SOLUTION INTRAMUSCULAR; INTRAVENOUS; SUBCUTANEOUS AS NEEDED
Status: DISCONTINUED | OUTPATIENT
Start: 2018-08-02 | End: 2018-08-02

## 2018-08-02 RX ORDER — MORPHINE SULFATE 4 MG/ML
2 INJECTION, SOLUTION INTRAMUSCULAR; INTRAVENOUS EVERY 10 MIN PRN
Status: DISCONTINUED | OUTPATIENT
Start: 2018-08-02 | End: 2018-08-02

## 2018-08-02 RX ORDER — HYDROCODONE BITARTRATE AND ACETAMINOPHEN 5; 325 MG/1; MG/1
2 TABLET ORAL AS NEEDED
Status: COMPLETED | OUTPATIENT
Start: 2018-08-02 | End: 2018-08-02

## 2018-08-02 RX ORDER — MORPHINE SULFATE 4 MG/ML
4 INJECTION, SOLUTION INTRAMUSCULAR; INTRAVENOUS EVERY 10 MIN PRN
Status: DISCONTINUED | OUTPATIENT
Start: 2018-08-02 | End: 2018-08-02

## 2018-08-02 RX ORDER — HALOPERIDOL 5 MG/ML
0.25 INJECTION INTRAMUSCULAR ONCE AS NEEDED
Status: DISCONTINUED | OUTPATIENT
Start: 2018-08-02 | End: 2018-08-02

## 2018-08-02 RX ORDER — ONDANSETRON 2 MG/ML
INJECTION INTRAMUSCULAR; INTRAVENOUS AS NEEDED
Status: DISCONTINUED | OUTPATIENT
Start: 2018-08-02 | End: 2018-08-02 | Stop reason: SURG

## 2018-08-02 RX ORDER — LIDOCAINE HYDROCHLORIDE 10 MG/ML
INJECTION, SOLUTION EPIDURAL; INFILTRATION; INTRACAUDAL; PERINEURAL AS NEEDED
Status: DISCONTINUED | OUTPATIENT
Start: 2018-08-02 | End: 2018-08-02 | Stop reason: SURG

## 2018-08-02 RX ORDER — MORPHINE SULFATE 10 MG/ML
6 INJECTION, SOLUTION INTRAMUSCULAR; INTRAVENOUS EVERY 10 MIN PRN
Status: DISCONTINUED | OUTPATIENT
Start: 2018-08-02 | End: 2018-08-02

## 2018-08-02 RX ADMIN — SODIUM CHLORIDE, SODIUM LACTATE, POTASSIUM CHLORIDE, CALCIUM CHLORIDE: 600; 310; 30; 20 INJECTION, SOLUTION INTRAVENOUS at 10:49:00

## 2018-08-02 RX ADMIN — ONDANSETRON 4 MG: 2 INJECTION INTRAMUSCULAR; INTRAVENOUS at 11:16:00

## 2018-08-02 RX ADMIN — SODIUM CHLORIDE, SODIUM LACTATE, POTASSIUM CHLORIDE, CALCIUM CHLORIDE: 600; 310; 30; 20 INJECTION, SOLUTION INTRAVENOUS at 11:15:00

## 2018-08-02 RX ADMIN — LIDOCAINE HYDROCHLORIDE 30 MG: 10 INJECTION, SOLUTION EPIDURAL; INFILTRATION; INTRACAUDAL; PERINEURAL at 10:53:00

## 2018-08-02 NOTE — ANESTHESIA POSTPROCEDURE EVALUATION
Patient: Soraya Edgar    Procedure Summary     Date:  08/02/18 Room / Location:  [address] [place] [address] MAIN OR 03 / 300 [facility] MAIN OR    Anesthesia Start:  6732 Anesthesia Stop:  2257    Procedure:  EYE SCLERAL BUCKLING (Right ) Diagnosis:  (Exposed scleral buckle right eye

## 2018-08-02 NOTE — ANESTHESIA PREPROCEDURE EVALUATION
Anesthesia PreOp Note    HPI:     Bulmaro Johnson is a 79year old female who presents for preoperative consultation requested by: Magnus Recinos MD    Date of Surgery: 8/2/2018    Procedure(s):  EYE SCLERAL BUCKLING  Indication: Exposed scleral brantley Oral Tab TAKE 1 TABLET BY MOUTH DAILY BEFORE BREAKFAST Disp: 90 tablet Rfl: 0 8/2/2018 at Unknown time   Fluticasone Furoate (ARNUITY ELLIPTA) 100 MCG/ACT Inhalation Aerosol Powder, Breath Activated Inhale 1 puff into the lungs daily.  Disp: 1 each Rfl: 5 8 pre josefa       Social History  Social History   Marital status: Single  Spouse name: N/A    Years of education: N/A  Number of children: N/A     Occupational History  None on file     Social History Main Topics   Smoking status: Never Smoker    Smokeless t pain medication  Informed Consent Plan and Risks Discussed With:  Patient  Discussed plan with:  CRNA      I have informed Manolo Ellisprincewill and/or legal guardian or family member of the nature of the anesthetic plan, benefits, risks including possible de

## 2018-08-02 NOTE — ANESTHESIA PROCEDURE NOTES
Airway  Date/Time: 8/2/2018 10:57 AM  Urgency: elective      General Information and Staff    Patient location during procedure: OR  Anesthesiologist: Margarita Cisneros  Resident/CRNA: Wil Shepard  Performed: CRNA     Indications and Patient Condition  [place]

## 2018-08-02 NOTE — OPERATIVE REPORT
[facility]    PATIENT'S NAME: Alex West   ATTENDING PHYSICIAN: Jordon Pettit MD   OPERATING PHYSICIAN: Jordon Pettit MD   PATIENT ACCOUNT#:   [de-identified]    LOCATION:  [facility] [address] PACU 1 EMHP 10  MEDICAL RECORD #:   [id number]

## 2018-08-02 NOTE — H&P
Chief Complaint exposed scleral buckle  History of Present Illness as above  Relevant Past History PORN  Relevant Social History neg  Relevant Family History neg  Inventory of Body Systems neg  Statement of Conclusion and Impressions Exposed Scleral Buckle

## 2018-08-03 ENCOUNTER — TELEPHONE (OUTPATIENT)
Dept: INTERNAL MEDICINE CLINIC | Facility: CLINIC | Age: 68
End: 2018-08-03

## 2018-08-04 RX ORDER — CYCLOBENZAPRINE HCL 10 MG
10 TABLET ORAL 3 TIMES DAILY
Qty: 30 TABLET | Refills: 1 | Status: SHIPPED | OUTPATIENT
Start: 2018-08-04 | End: 2018-08-24

## 2018-08-23 ENCOUNTER — TELEPHONE (OUTPATIENT)
Dept: INTERNAL MEDICINE CLINIC | Facility: CLINIC | Age: 68
End: 2018-08-23

## 2018-08-23 NOTE — TELEPHONE ENCOUNTER
----- Message from April Miller sent at 8/23/2018  9:03 AM CDT -----  Regarding: Other  Contact: [phone]  Not feeling well have symptoms like the flu with chills but no fever.  Is it possible to see the doctor Saturday the 25th at about 11 am

## 2018-09-06 DIAGNOSIS — E03.9 HYPOTHYROIDISM, UNSPECIFIED TYPE: ICD-10-CM

## 2018-09-06 RX ORDER — LEVOTHYROXINE SODIUM 0.07 MG/1
TABLET ORAL
Qty: 90 TABLET | Refills: 0 | Status: SHIPPED | OUTPATIENT
Start: 2018-09-06 | End: 2018-12-11

## 2018-09-20 PROCEDURE — 86780 TREPONEMA PALLIDUM: CPT | Performed by: INTERNAL MEDICINE

## 2018-09-20 PROCEDURE — 87536 HIV-1 QUANT&REVRSE TRNSCRPJ: CPT | Performed by: INTERNAL MEDICINE

## 2018-09-20 PROCEDURE — 86360 T CELL ABSOLUTE COUNT/RATIO: CPT | Performed by: INTERNAL MEDICINE

## 2018-12-11 DIAGNOSIS — E03.9 HYPOTHYROIDISM, UNSPECIFIED TYPE: ICD-10-CM

## 2018-12-11 RX ORDER — LEVOTHYROXINE SODIUM 0.07 MG/1
TABLET ORAL
Qty: 90 TABLET | Refills: 0 | Status: SHIPPED | OUTPATIENT
Start: 2018-12-11 | End: 2019-03-11

## 2018-12-19 ENCOUNTER — OFFICE VISIT (OUTPATIENT)
Dept: INTERNAL MEDICINE CLINIC | Facility: CLINIC | Age: 68
End: 2018-12-19
Payer: MEDICARE

## 2018-12-19 VITALS
RESPIRATION RATE: 16 BRPM | HEIGHT: 58 IN | BODY MASS INDEX: 27.08 KG/M2 | OXYGEN SATURATION: 98 % | DIASTOLIC BLOOD PRESSURE: 70 MMHG | SYSTOLIC BLOOD PRESSURE: 114 MMHG | HEART RATE: 75 BPM | WEIGHT: 129 LBS

## 2018-12-19 DIAGNOSIS — B02.8 HERPES ZOSTER WITH COMPLICATION: Primary | ICD-10-CM

## 2018-12-19 DIAGNOSIS — R21 RASH: ICD-10-CM

## 2018-12-19 PROCEDURE — 99214 OFFICE O/P EST MOD 30 MIN: CPT | Performed by: INTERNAL MEDICINE

## 2018-12-19 RX ORDER — METHYLPREDNISOLONE 4 MG/1
TABLET ORAL
Qty: 1 KIT | Refills: 0 | Status: SHIPPED | OUTPATIENT
Start: 2018-12-19 | End: 2019-04-04 | Stop reason: ALTCHOICE

## 2018-12-19 RX ORDER — VALACYCLOVIR HYDROCHLORIDE 1 G/1
1 TABLET, FILM COATED ORAL 3 TIMES DAILY
Qty: 21 TABLET | Refills: 0 | Status: SHIPPED | OUTPATIENT
Start: 2018-12-19 | End: 2018-12-26

## 2018-12-19 NOTE — PATIENT INSTRUCTIONS
Shingles (Herpes Zoster)     Talk to your healthcare provider about the shingles vaccine. Shingles is also called herpes zoster. It is a painful skin rash caused by the herpes zoster virus. This is the same virus that causes chickenpox.  After a perso For most people, shingles heals on its own in a few weeks.  But treatment is recommended to help relieve pain, speed healing, and reduce the risk of complications. Antiviral medicines are prescribed within the first 72 hours of the appearance of the rash. T You can only get shingles if you have had chickenpox in the past. Those who have never had chickenpox can get the virus from you. Although instead of developing shingles, the person may get chickenpox.  Until your blisters form scabs, avoid contact with oth After the blisters heal, the affected skin may be sensitive or painful for weeks or months, gradually resolving over time. But, sometimes this can last longer and be permanent (called postherpetic neuralgia.)  Shingles vaccines are available.  Vaccination c © 6994-9069 [employer] [address]. [address] [facility], [facility] [place]. All rights reserved. This information is not intended as a substitute for professional medical care. Always follow your healthcare professional's instructions.

## 2018-12-19 NOTE — PROGRESS NOTES
Dhruv Valdovinos is a 79year old female.     Chief complaint: rash    HPI:   79year old female with PMH as listed below here for   Rash   Started 2-3 weeks ago   Itching   Left ear started hurting Friday   Throbbing pain   No discharge   No URTI   No si Disorder of thyroid     hypothyroidism   • Diverticulosis    • Esophageal reflux    • History of blood transfusion 1993    [facility]   • HIV (human immunodeficiency virus infection) ([facility]    • HIV (human immunodeficiency virus infection) ([facility] Similar rash small patch in lower abdomen   HEENT: atraumatic, normocephalic,ears and throat are clear  NECK: supple,no adenopathy  LUNGS: clear to auscultation  CARDIO: RRR without murmur  GI: good BS's,no masses, HSM or tenderness  EXTREMITIES: no cyan

## 2019-01-16 RX ORDER — FLUTICASONE PROPIONATE 50 MCG
SPRAY, SUSPENSION (ML) NASAL
Refills: 0 | OUTPATIENT
Start: 2019-01-16

## 2019-01-16 RX ORDER — FLUTICASONE PROPIONATE 50 MCG
SPRAY, SUSPENSION (ML) NASAL
Qty: 1 BOTTLE | Refills: 0 | Status: SHIPPED | OUTPATIENT
Start: 2019-01-16 | End: 2021-04-27

## 2019-01-16 NOTE — TELEPHONE ENCOUNTER
Pharmacy requests a 90 day supply. Refilled a 30 day supply today, as patient has a follow up appointment later this month. Please deny 90 day supply.

## 2019-01-16 NOTE — TELEPHONE ENCOUNTER
Refill requested for   Name from pharmacy: FLUTICASONE 50MCG NASAL SP (120) RX          Will file in chart as: FLUTICASONE PROPIONATE 50 MCG/ACT Nasal Suspension    Sig: SHAKE LIQUID AND USE 2 SPRAYS IN EACH NOSTRIL DAILY    Disp:  Not specified (Pharmacy

## 2019-02-22 ENCOUNTER — HOSPITAL ENCOUNTER (EMERGENCY)
Facility: HOSPITAL | Age: 69
Discharge: STILL ADMITTED OR ADMINISTRATIVE | End: 2019-02-23
Payer: MEDICARE

## 2019-02-22 ENCOUNTER — HOSPITAL ENCOUNTER (OUTPATIENT)
Age: 69
Discharge: OTHER INSTITUTION | End: 2019-02-22
Attending: EMERGENCY MEDICINE
Payer: MEDICARE

## 2019-02-22 VITALS
HEIGHT: 58 IN | HEART RATE: 90 BPM | RESPIRATION RATE: 18 BRPM | WEIGHT: 128 LBS | DIASTOLIC BLOOD PRESSURE: 82 MMHG | BODY MASS INDEX: 26.87 KG/M2 | SYSTOLIC BLOOD PRESSURE: 119 MMHG | OXYGEN SATURATION: 97 % | TEMPERATURE: 98 F

## 2019-02-22 DIAGNOSIS — R00.2 PALPITATIONS: Primary | ICD-10-CM

## 2019-02-22 PROCEDURE — 99214 OFFICE O/P EST MOD 30 MIN: CPT

## 2019-02-22 PROCEDURE — 93010 ELECTROCARDIOGRAM REPORT: CPT

## 2019-02-22 PROCEDURE — 93005 ELECTROCARDIOGRAM TRACING: CPT

## 2019-02-22 PROCEDURE — 93010 ELECTROCARDIOGRAM REPORT: CPT | Performed by: EMERGENCY MEDICINE

## 2019-02-22 NOTE — ED PROVIDER NOTES
Patient Seen in: [facility] AND CLINICS Immediate Care In [address] [place] [address]    History   Patient presents with:  Palpitations    Stated Complaint: palpitations     HPI    Patient is a 80-year-old female who presents with palpitations that started at rest at 8 AM.  She reviewed and negative except as noted above.     Physical Exam     ED Triage Vitals [02/22/19 0839]   /82   Pulse 90   Resp 18   Temp 97.7 °F (36.5 °C)   Temp src Oral   SpO2 97 %   O2 Device None (Room air)       Current:/82   Pulse 90   Temp 9

## 2019-02-22 NOTE — ED INITIAL ASSESSMENT (HPI)
Pt to IC reporting \"heart racing\" for about 15 minutes this morning. States incident occurred around 0800 today. Denies shortness of breath or difficulty breathing. Denies chest pain. States she was sitting at time. Dr Farrah Gomez at bedside.

## 2019-03-11 DIAGNOSIS — E03.9 HYPOTHYROIDISM, UNSPECIFIED TYPE: ICD-10-CM

## 2019-03-11 RX ORDER — LEVOTHYROXINE SODIUM 0.07 MG/1
TABLET ORAL
Qty: 90 TABLET | Refills: 0 | Status: SHIPPED | OUTPATIENT
Start: 2019-03-11 | End: 2019-06-09

## 2019-03-12 ENCOUNTER — PATIENT MESSAGE (OUTPATIENT)
Dept: INTERNAL MEDICINE CLINIC | Facility: CLINIC | Age: 69
End: 2019-03-12

## 2019-03-13 ENCOUNTER — TELEPHONE (OUTPATIENT)
Dept: INTERNAL MEDICINE CLINIC | Facility: CLINIC | Age: 69
End: 2019-03-13

## 2019-03-13 ENCOUNTER — HOSPITAL ENCOUNTER (OUTPATIENT)
Dept: GENERAL RADIOLOGY | Facility: HOSPITAL | Age: 69
Discharge: HOME | End: 2019-03-13
Attending: ALLERGY & IMMUNOLOGY
Payer: MEDICARE

## 2019-03-13 ENCOUNTER — OFFICE VISIT (OUTPATIENT)
Dept: ALLERGY | Facility: CLINIC | Age: 69
End: 2019-03-13
Payer: MEDICARE

## 2019-03-13 ENCOUNTER — TELEPHONE (OUTPATIENT)
Dept: ALLERGY | Facility: CLINIC | Age: 69
End: 2019-03-13

## 2019-03-13 ENCOUNTER — LAB ENCOUNTER (OUTPATIENT)
Dept: LAB | Facility: HOSPITAL | Age: 69
End: 2019-03-13
Attending: ALLERGY & IMMUNOLOGY
Payer: MEDICARE

## 2019-03-13 VITALS
HEART RATE: 77 BPM | OXYGEN SATURATION: 97 % | DIASTOLIC BLOOD PRESSURE: 87 MMHG | TEMPERATURE: 98 F | SYSTOLIC BLOOD PRESSURE: 142 MMHG

## 2019-03-13 DIAGNOSIS — Z51.81 THERAPEUTIC DRUG MONITORING: ICD-10-CM

## 2019-03-13 DIAGNOSIS — B20 HIV (HUMAN IMMUNODEFICIENCY VIRUS INFECTION) (HCC): ICD-10-CM

## 2019-03-13 DIAGNOSIS — B20 HUMAN IMMUNODEFICIENCY VIRUS (HIV) DISEASE (HCC): ICD-10-CM

## 2019-03-13 DIAGNOSIS — R07.81 PLEURITIC CHEST PAIN: Primary | ICD-10-CM

## 2019-03-13 DIAGNOSIS — J45.909 ASTHMA, EXTRINSIC, UNSPECIFIED ASTHMA SEVERITY, UNCOMPLICATED: ICD-10-CM

## 2019-03-13 DIAGNOSIS — R07.81 PLEURITIC CHEST PAIN: ICD-10-CM

## 2019-03-13 DIAGNOSIS — R03.0 ELEVATED BLOOD PRESSURE READING WITHOUT DIAGNOSIS OF HYPERTENSION: ICD-10-CM

## 2019-03-13 LAB
ALBUMIN SERPL-MCNC: 4.1 G/DL (ref 3.4–5)
ALBUMIN/GLOB SERPL: 1.1 {RATIO} (ref 1–2)
ALP LIVER SERPL-CCNC: 108 U/L (ref 55–142)
ALT SERPL-CCNC: 24 U/L (ref 13–56)
ANION GAP SERPL CALC-SCNC: 6 MMOL/L (ref 0–18)
AST SERPL-CCNC: 16 U/L (ref 15–37)
BASOPHILS # BLD AUTO: 0.04 X10(3) UL (ref 0–0.2)
BASOPHILS NFR BLD AUTO: 0.6 %
BILIRUB SERPL-MCNC: 0.5 MG/DL (ref 0.1–2)
BUN BLD-MCNC: 12 MG/DL (ref 7–18)
BUN/CREAT SERPL: 19.7 (ref 10–20)
CALCIUM BLD-MCNC: 9.2 MG/DL (ref 8.5–10.1)
CHLORIDE SERPL-SCNC: 106 MMOL/L (ref 98–107)
CHOLEST SMN-MCNC: 221 MG/DL (ref ?–200)
CO2 SERPL-SCNC: 29 MMOL/L (ref 21–32)
CREAT BLD-MCNC: 0.61 MG/DL (ref 0.55–1.02)
DEPRECATED RDW RBC AUTO: 41.5 FL (ref 35.1–46.3)
EOSINOPHIL # BLD AUTO: 0.07 X10(3) UL (ref 0–0.7)
EOSINOPHIL NFR BLD AUTO: 1 %
ERYTHROCYTE [DISTWIDTH] IN BLOOD BY AUTOMATED COUNT: 12.6 % (ref 11–15)
GLOBULIN PLAS-MCNC: 3.6 G/DL (ref 2.8–4.4)
GLUCOSE BLD-MCNC: 86 MG/DL (ref 70–99)
HCT VFR BLD AUTO: 41.5 % (ref 35–48)
HDLC SERPL-MCNC: 67 MG/DL (ref 40–59)
HGB BLD-MCNC: 13.9 G/DL (ref 12–16)
IMM GRANULOCYTES # BLD AUTO: 0.02 X10(3) UL (ref 0–1)
IMM GRANULOCYTES NFR BLD: 0.3 %
LDLC SERPL CALC-MCNC: 126 MG/DL (ref ?–100)
LYMPHOCYTES # BLD AUTO: 1.35 X10(3) UL (ref 1–4)
LYMPHOCYTES NFR BLD AUTO: 18.8 %
M PROTEIN MFR SERPL ELPH: 7.7 G/DL (ref 6.4–8.2)
MCH RBC QN AUTO: 30.3 PG (ref 26–34)
MCHC RBC AUTO-ENTMCNC: 33.5 G/DL (ref 31–37)
MCV RBC AUTO: 90.6 FL (ref 80–100)
MONOCYTES # BLD AUTO: 0.61 X10(3) UL (ref 0.1–1)
MONOCYTES NFR BLD AUTO: 8.5 %
NEUTROPHILS # BLD AUTO: 5.1 X10 (3) UL (ref 1.5–7.7)
NEUTROPHILS # BLD AUTO: 5.1 X10(3) UL (ref 1.5–7.7)
NEUTROPHILS NFR BLD AUTO: 70.8 %
NONHDLC SERPL-MCNC: 154 MG/DL (ref ?–130)
OSMOLALITY SERPL CALC.SUM OF ELEC: 291 MOSM/KG (ref 275–295)
PLATELET # BLD AUTO: 210 10(3)UL (ref 150–450)
POTASSIUM SERPL-SCNC: 3.8 MMOL/L (ref 3.5–5.1)
RBC # BLD AUTO: 4.58 X10(6)UL (ref 3.8–5.3)
SODIUM SERPL-SCNC: 141 MMOL/L (ref 136–145)
TRIGL SERPL-MCNC: 139 MG/DL (ref 30–149)
TSI SER-ACNC: 1.28 MIU/ML (ref 0.36–3.74)
VLDLC SERPL CALC-MCNC: 28 MG/DL (ref 0–30)
WBC # BLD AUTO: 7.2 X10(3) UL (ref 4–11)

## 2019-03-13 PROCEDURE — 71046 X-RAY EXAM CHEST 2 VIEWS: CPT | Performed by: ALLERGY & IMMUNOLOGY

## 2019-03-13 PROCEDURE — 36415 COLL VENOUS BLD VENIPUNCTURE: CPT

## 2019-03-13 PROCEDURE — 80061 LIPID PANEL: CPT

## 2019-03-13 PROCEDURE — 80053 COMPREHEN METABOLIC PANEL: CPT

## 2019-03-13 PROCEDURE — G0463 HOSPITAL OUTPT CLINIC VISIT: HCPCS | Performed by: ALLERGY & IMMUNOLOGY

## 2019-03-13 PROCEDURE — 84443 ASSAY THYROID STIM HORMONE: CPT

## 2019-03-13 PROCEDURE — 86360 T CELL ABSOLUTE COUNT/RATIO: CPT

## 2019-03-13 PROCEDURE — 85025 COMPLETE CBC W/AUTO DIFF WBC: CPT

## 2019-03-13 PROCEDURE — 99214 OFFICE O/P EST MOD 30 MIN: CPT | Performed by: ALLERGY & IMMUNOLOGY

## 2019-03-13 PROCEDURE — 86780 TREPONEMA PALLIDUM: CPT

## 2019-03-13 PROCEDURE — 94010 BREATHING CAPACITY TEST: CPT | Performed by: ALLERGY & IMMUNOLOGY

## 2019-03-13 PROCEDURE — 87536 HIV-1 QUANT&REVRSE TRNSCRPJ: CPT

## 2019-03-13 NOTE — TELEPHONE ENCOUNTER
----- Message from Leann Grant MD sent at 3/13/2019  3:54 PM CDT -----  Please call patient with normal chest x-ray

## 2019-03-13 NOTE — TELEPHONE ENCOUNTER
Spoke to patient. She reports she is having some tightness in her chest, but no difficulty breathing. Voice does not sound restrictive or labored. PT reports she is in a meeting and would like to call back.    RN informed patient that she has an appointmen

## 2019-03-13 NOTE — PROGRESS NOTES
Dhruv Valdovinos is a 79year old female. HPI:   Patient presents with:  Asthma: Pt reports she feels like it sometimes hurts to breathe. She has been taking her daily inhalers. Slight heaviness in her chest, like she can't get a full breath.  Pt repor History:   Procedure Laterality Date   •      • CATARACT     • COLONOSCOPY N/A 10/20/2017    Performed by Mary Nava MD at [facility] ENDOSCOPY   • EGD  2018   • ESOPHAGOGASTRODUODENOSCOPY (EGD) N/A 3/23/2018    Performed by Sivan Whitney MG Oral Tablet Therapy Pack As directed. Disp: 1 kit Rfl: 0   emtricitabine-tenofovir 200-300 MG Oral Tab Take 1 tablet by mouth daily.  Disp: 30 tablet Rfl: 5   METAXALONE 800 MG Oral Tab TAKE 1 TABLET(800 MG) BY MOUTH THREE TIMES DAILY Disp: 20 tablet Rfl difficulty taking a deep breath and pain on inspiration. By history symptoms improved with Advil. Mild cough. Denies wheezing. Pulse ox normal at 97% on room air. Patient denies leg pain leg swelling edema.   No recent travel or surgeries    Spirometry

## 2019-03-13 NOTE — PATIENT INSTRUCTIONS
Recs: Check chest x-ray, will call with results  Trial of Advil as by history has provided symptomatic relief in the past  Continue with current asthma medications including arnnuity 100 mcg 1 puff once a day albuterol as needed   Continue with Xyzal and F

## 2019-03-13 NOTE — TELEPHONE ENCOUNTER
Pt called in stating that she is having an allergy flare up. Pt stated that when she uses her asthma inhaler that she is still feeling discomfort in her chest.  Pt is scheduled for 10:45 am today.   Please advise

## 2019-03-13 NOTE — TELEPHONE ENCOUNTER
Pt contacted, last name and  verified, and labs reviewed per Dr. Megan Ntaion. Pt verbalized understanding, all questions answered and denied further questions at this time.

## 2019-03-14 LAB
CD3+CD4+ CELLS # BLD: 284 /MM3
CD3+CD4+ CELLS NFR BLD: 21 %
CD3+CD4+ CELLS/CD3+CD8+ CLL SPEC: 0.4
CD3+CD8+ CELLS NFR SPEC: 50 %

## 2019-03-15 LAB — T PALLIDUM AB SER QL: NEGATIVE

## 2019-03-19 LAB
HIV1 RNA # SPEC NAA+PROBE: 386 COPIES/ML
HIV1 RNA SER-IMP: DETECTED
HIV1 RNA SPEC NAA+PROBE-LOG#: 2.59 LOG (COPIES/ML)

## 2019-04-04 ENCOUNTER — OFFICE VISIT (OUTPATIENT)
Dept: OTOLARYNGOLOGY | Facility: CLINIC | Age: 69
End: 2019-04-04
Payer: MEDICARE

## 2019-04-04 VITALS
HEIGHT: 58 IN | TEMPERATURE: 98 F | BODY MASS INDEX: 26.87 KG/M2 | DIASTOLIC BLOOD PRESSURE: 81 MMHG | SYSTOLIC BLOOD PRESSURE: 138 MMHG | WEIGHT: 128 LBS

## 2019-04-04 DIAGNOSIS — M54.2 CERVICALGIA: Primary | ICD-10-CM

## 2019-04-04 PROCEDURE — G0463 HOSPITAL OUTPT CLINIC VISIT: HCPCS | Performed by: OTOLARYNGOLOGY

## 2019-04-04 PROCEDURE — 99203 OFFICE O/P NEW LOW 30 MIN: CPT | Performed by: OTOLARYNGOLOGY

## 2019-04-04 RX ORDER — METHYLPREDNISOLONE 4 MG/1
TABLET ORAL
Qty: 1 PACKAGE | Refills: 0 | Status: SHIPPED | OUTPATIENT
Start: 2019-04-04 | End: 2019-12-06 | Stop reason: ALTCHOICE

## 2019-04-04 NOTE — PROGRESS NOTES
Fritz Allen is a 81year old female. Patient presents with:  Ear Pain: throbbing pain in the right ear for 1.5 week       HISTORY OF PRESENT ILLNESS  4/4/2019  Patient  presents with ear pain in the right ears for2 weeks.  has not been exposed to ryan file        Relationship status: Not on file      Intimate partner violence:        Fear of current or ex partner: Not on file        Emotionally abused: Not on file        Physically abused: Not on file        Forced sexual activity: Not on file    Other Easy bleeding and easy bruising.      PHYSICAL EXAM    /81   Temp 98.1 °F (36.7 °C) (Tympanic)   Ht 4' 10\" (1.473 m)   Wt 128 lb (58.1 kg)   LMP  (LMP Unknown)   BMI 26.75 kg/m²     System Findings Details   Skin Normal Inspection - Normal. No suspic the lungs daily. , Disp: 1 each, Rfl: 5  •  Albuterol Sulfate  (90 Base) MCG/ACT Inhalation Aero Soln, Inhale 2 puffs into the lungs every 6 (six) hours as needed for Wheezing.  inhale 2 puff by inhalation route  every 4 - 6 hours as needed, Disp: 1 I

## 2019-05-10 RX ORDER — FLUTICASONE FUROATE 100 UG/1
POWDER RESPIRATORY (INHALATION)
Qty: 3 EACH | Refills: 0 | Status: SHIPPED | OUTPATIENT
Start: 2019-05-10 | End: 2019-10-11

## 2019-05-10 NOTE — TELEPHONE ENCOUNTER
Message left on pt's mobile voicemail informing her that Arnuity inhaler Rx was refilled and sent to her pharmacy. Pt informed she is due for f/u appt 8 or 9/2019 and asked that she call the Allergy Department to schedule a f/u appt.

## 2019-05-10 NOTE — TELEPHONE ENCOUNTER
Pt last seen in Allergy 3/13/2019 .  . .    Pleuritic chest pain  (primary encounter diagnosis)  Asthma, extrinsic, unspecified asthma severity, uncomplicated  Elevated blood pressure reading without diagnosis of hypertension     Refill request received for

## 2019-06-09 DIAGNOSIS — E03.9 HYPOTHYROIDISM, UNSPECIFIED TYPE: ICD-10-CM

## 2019-06-10 RX ORDER — LEVOTHYROXINE SODIUM 0.07 MG/1
TABLET ORAL
Qty: 90 TABLET | Refills: 0 | Status: SHIPPED | OUTPATIENT
Start: 2019-06-10 | End: 2019-09-07

## 2019-06-27 ENCOUNTER — OFFICE VISIT (OUTPATIENT)
Dept: FAMILY MEDICINE CLINIC | Facility: CLINIC | Age: 69
End: 2019-06-27
Payer: MEDICARE

## 2019-06-27 VITALS
BODY MASS INDEX: 27.42 KG/M2 | WEIGHT: 130.63 LBS | TEMPERATURE: 99 F | SYSTOLIC BLOOD PRESSURE: 108 MMHG | HEIGHT: 58 IN | HEART RATE: 98 BPM | DIASTOLIC BLOOD PRESSURE: 76 MMHG | OXYGEN SATURATION: 97 %

## 2019-06-27 DIAGNOSIS — R82.998 URINE LEUKOCYTES: ICD-10-CM

## 2019-06-27 DIAGNOSIS — R39.89 ABNORMAL URINE COLOR: Primary | ICD-10-CM

## 2019-06-27 PROCEDURE — 99213 OFFICE O/P EST LOW 20 MIN: CPT | Performed by: PHYSICIAN ASSISTANT

## 2019-06-27 PROCEDURE — 81003 URINALYSIS AUTO W/O SCOPE: CPT | Performed by: PHYSICIAN ASSISTANT

## 2019-06-27 PROCEDURE — 87086 URINE CULTURE/COLONY COUNT: CPT | Performed by: PHYSICIAN ASSISTANT

## 2019-06-27 RX ORDER — BETAMETHASONE DIPROPIONATE 0.05 %
OINTMENT (GRAM) TOPICAL
Refills: 0 | COMMUNITY
Start: 2019-04-25 | End: 2020-10-27

## 2019-06-27 NOTE — PATIENT INSTRUCTIONS
Urinary Tract Infections in Women    Urinary tract infections (UTIs) are most often caused by bacteria. These bacteria enter the urinary tract. The bacteria may come from outside the body.  Or they may travel from the skin outside the rectum or vagina int The lifestyle changes below will help get rid of your UTI. They may also help prevent future UTIs. · Drink plenty of fluids. This includes water, juice, or other caffeine-free drinks. Fluids help flush bacteria out of your body. · Empty your bladder.  Alw The results of a urine culture help your doctor find out what's causing your infection and determine the best way to treat it. Almost 90% of urinary tract infections (UTIs) are caused by E. coli bacteria.  Other types of bacteria, tuberculosis, and yeast in How do I get ready for this test?  Drink enough water before the test so that you can urinate. Tell your doctor if you have taken antibiotic medicine recently. Be sure your doctor knows about all medicines, herbs, vitamins, and supplements you are taking.

## 2019-06-27 NOTE — PROGRESS NOTES
CHIEF COMPLAINT:   Patient presents with:  Urinary: started tuesday, change in urine color. some LL back pain       HPI:   Nuvia Sanchez is a 81year old female who presents with symptoms of dark (mustard) colored urine. for last 2 days.  Symptoms ha TRUVADA 200-300 MG Oral Tab TAKE 1 TABLET BY MOUTH DAILY Disp: 30 tablet Rfl: 5      Past Medical History:   Diagnosis Date   • Allergic rhinitis    • Anxiety state    • Asthma    • Blood disorder     anemia   • Disorder of thyroid     hypothyroidism   • D URINALYSIS, AUTO, W/O SCOPE    Collection Time: 06/27/19  4:08 PM   Result Value Ref Range    Glucose Urine negative mg/dL    Bilirubin negative Negative    Ketones, UA negative Negative mg/dL    Spec Gravity 1.010 1.005 - 1.030    Blood Urine negative Neg Urinary tract infections (UTIs) are most often caused by bacteria. These bacteria enter the urinary tract. The bacteria may come from outside the body. Or they may travel from the skin outside the rectum or vagina into the urethra.  Female anatomy makes it · Drink plenty of fluids. This includes water, juice, or other caffeine-free drinks. Fluids help flush bacteria out of your body. · Empty your bladder. Always empty your bladder when you feel the urge to urinate. And always urinate before going to sleep. You may need this test if you have symptoms of a UTI.  These include:  · Fever and chills  · Burning pain when urinating  · Pain in the back or lower belly  · Frequent need to urinate  · Cloudy or smelly urine  What other tests might I have along with this © 0777-7863 [employer] [address]. 1407 [facility], [facility] [place]. All rights reserved. This information is not intended as a substitute for professional medical care. Always follow your healthcare professional's instructions.

## 2019-08-21 RX ORDER — ALBUTEROL SULFATE 90 UG/1
AEROSOL, METERED RESPIRATORY (INHALATION)
Qty: 18 G | Refills: 0 | Status: SHIPPED | OUTPATIENT
Start: 2019-08-21 | End: 2021-04-27

## 2019-08-21 RX ORDER — ALBUTEROL SULFATE 90 UG/1
AEROSOL, METERED RESPIRATORY (INHALATION)
Qty: 90 G | Refills: 0 | OUTPATIENT
Start: 2019-08-21

## 2019-08-21 NOTE — TELEPHONE ENCOUNTER
Albuterol 2 puffs every 4-6 hours as needed. If needing albuterol sooner than every 4 hours and recommend to seek urgent care or if worsening symptoms.   Recommend follow-up if not improving over the next 5 to 7 days with albuterol or if worsening symptoms

## 2019-08-21 NOTE — TELEPHONE ENCOUNTER
Called patient back with Dr. Kelley Round recommendations. Patient verbalized understanding and has no further questions at this time.

## 2019-08-21 NOTE — TELEPHONE ENCOUNTER
Refill requested for:  Name from pharmacy: ALBUTEROL HFA INH (200 PUFFS) 18GM          Will file in chart as: ALBUTEROL SULFATE  (90 Base) MCG/ACT Inhalation Aero Soln    Sig: INHALE 2 PUFFS INTO THE LUNGS EVERY 4 TO 6 HOURS AS NEEDED FOR WHEEZING

## 2019-08-21 NOTE — TELEPHONE ENCOUNTER
Patient called back for asthma triage. Reports having heaviness in chest, happening for last week. Was out of albuterol, has not been able to use it. Using Arnuity inhaler once daily.   Happens around this time of year--when using albuterol in past it has

## 2019-09-05 ENCOUNTER — OFFICE VISIT (OUTPATIENT)
Dept: ALLERGY | Facility: CLINIC | Age: 69
End: 2019-09-05
Payer: MEDICARE

## 2019-09-05 VITALS
WEIGHT: 128 LBS | DIASTOLIC BLOOD PRESSURE: 75 MMHG | TEMPERATURE: 98 F | RESPIRATION RATE: 17 BRPM | SYSTOLIC BLOOD PRESSURE: 109 MMHG | OXYGEN SATURATION: 96 % | HEART RATE: 102 BPM | BODY MASS INDEX: 27.61 KG/M2 | HEIGHT: 56.9 IN

## 2019-09-05 DIAGNOSIS — J30.81 ALLERGIC RHINITIS DUE TO ANIMAL DANDER: ICD-10-CM

## 2019-09-05 DIAGNOSIS — J30.89 ALLERGIC RHINITIS DUE TO DUST MITE: ICD-10-CM

## 2019-09-05 DIAGNOSIS — J45.909 ASTHMA, EXTRINSIC, UNSPECIFIED ASTHMA SEVERITY, UNCOMPLICATED: Primary | ICD-10-CM

## 2019-09-05 PROCEDURE — 81001 URINALYSIS AUTO W/SCOPE: CPT | Performed by: INTERNAL MEDICINE

## 2019-09-05 PROCEDURE — 99214 OFFICE O/P EST MOD 30 MIN: CPT | Performed by: ALLERGY & IMMUNOLOGY

## 2019-09-05 PROCEDURE — 94010 BREATHING CAPACITY TEST: CPT | Performed by: ALLERGY & IMMUNOLOGY

## 2019-09-05 PROCEDURE — 86360 T CELL ABSOLUTE COUNT/RATIO: CPT | Performed by: INTERNAL MEDICINE

## 2019-09-05 PROCEDURE — 87536 HIV-1 QUANT&REVRSE TRNSCRPJ: CPT | Performed by: INTERNAL MEDICINE

## 2019-09-05 PROCEDURE — G0463 HOSPITAL OUTPT CLINIC VISIT: HCPCS | Performed by: ALLERGY & IMMUNOLOGY

## 2019-09-05 RX ORDER — ALBUTEROL SULFATE 90 UG/1
2 AEROSOL, METERED RESPIRATORY (INHALATION) EVERY 6 HOURS PRN
Qty: 1 INHALER | Refills: 0 | Status: SHIPPED | OUTPATIENT
Start: 2019-09-05 | End: 2020-02-06

## 2019-09-05 RX ORDER — FLUTICASONE PROPIONATE 50 MCG
2 SPRAY, SUSPENSION (ML) NASAL DAILY
Qty: 3 BOTTLE | Refills: 1 | Status: SHIPPED | OUTPATIENT
Start: 2019-09-05 | End: 2020-04-29

## 2019-09-05 NOTE — PATIENT INSTRUCTIONS
1. Asthma  Recent flare over the past 1 to 2 weeks that required her starting albuterol in addition to her Arnuity 100 mcg 1 puff once a day  No ED visits or prednisone required        Recs:  Reviewed signs and symptoms of persistent asthma including the r

## 2019-09-05 NOTE — PROGRESS NOTES
Martha Dean is a 81year old female. HPI:   Patient presents with:  Asthma: Pt reprots she used her rescue inhaler twice the day she got it, and since then she has needed it only once daily. Pt reports she is sleeping well.      Patient is a 69-ye Performed by Johann Weems MD at [facility] ENDOSCOPY   • EGD  03/2018   • ESOPHAGOGASTRODUODENOSCOPY (EGD) N/A 3/23/2018    Performed by Mercedes Porter MD at [facility] Right 8/2/2018    Performed by Dipika Will, EVERY NIGHT AT BEDTIME Disp: 30 tablet Rfl: 0   Betamethasone Dipropionate 0.05 % External Ointment KARL EXT TO RASH BID FOR 2 WEEKS MAX PER EPISODE Disp:  Rfl: 0   methylPREDNISolone 4 MG Oral Tablet Therapy Pack Take by oral route.  Disp: 1 Package Rfl: 0 Asthma  Recent flare over the past 1 to 2 weeks that required her starting albuterol in addition to her Arnuity 100 mcg 1 puff once a day  No ED visits or prednisone required        Recs:  Reviewed signs and symptoms of persistent asthma including the rule

## 2019-09-07 DIAGNOSIS — E03.9 HYPOTHYROIDISM, UNSPECIFIED TYPE: ICD-10-CM

## 2019-09-09 ENCOUNTER — TELEPHONE (OUTPATIENT)
Dept: INTERNAL MEDICINE CLINIC | Facility: CLINIC | Age: 69
End: 2019-09-09

## 2019-09-09 RX ORDER — LEVOTHYROXINE SODIUM 0.07 MG/1
TABLET ORAL
Qty: 90 TABLET | Refills: 0 | Status: SHIPPED | OUTPATIENT
Start: 2019-09-09 | End: 2019-12-06

## 2019-09-09 RX ORDER — CYCLOBENZAPRINE HCL 10 MG
10 TABLET ORAL NIGHTLY PRN
Qty: 20 TABLET | Refills: 1 | Status: SHIPPED | OUTPATIENT
Start: 2019-09-09 | End: 2019-09-29

## 2019-09-09 NOTE — TELEPHONE ENCOUNTER
Fax request for refill on Cyclobenzaprine 10 mg tablets. Directions: Take 1 tablet by mouth three times daily.      Medication not on med list.

## 2019-09-14 ENCOUNTER — OFFICE VISIT (OUTPATIENT)
Dept: INTERNAL MEDICINE CLINIC | Facility: CLINIC | Age: 69
End: 2019-09-14
Payer: MEDICARE

## 2019-09-14 VITALS
SYSTOLIC BLOOD PRESSURE: 126 MMHG | RESPIRATION RATE: 17 BRPM | DIASTOLIC BLOOD PRESSURE: 81 MMHG | HEIGHT: 57 IN | HEART RATE: 79 BPM | BODY MASS INDEX: 27.83 KG/M2 | TEMPERATURE: 99 F | WEIGHT: 129 LBS

## 2019-09-14 DIAGNOSIS — Z13.6 SCREENING FOR HEART DISEASE: ICD-10-CM

## 2019-09-14 DIAGNOSIS — E78.2 MIXED HYPERLIPIDEMIA: ICD-10-CM

## 2019-09-14 DIAGNOSIS — Z12.39 SCREENING FOR MALIGNANT NEOPLASM OF BREAST: Primary | ICD-10-CM

## 2019-09-14 DIAGNOSIS — Z00.00 MEDICARE ANNUAL WELLNESS VISIT, SUBSEQUENT: ICD-10-CM

## 2019-09-14 DIAGNOSIS — E03.9 HYPOTHYROIDISM, UNSPECIFIED TYPE: ICD-10-CM

## 2019-09-14 DIAGNOSIS — N95.9 POSTMENOPAUSAL SYMPTOMS: ICD-10-CM

## 2019-09-14 PROCEDURE — G0439 PPPS, SUBSEQ VISIT: HCPCS | Performed by: PHYSICIAN ASSISTANT

## 2019-09-14 NOTE — PROGRESS NOTES
HPI:   Chantel Mehta is a 81year old female who presents for a Medicare Annual Wellness visit.     Patient Active Problem List:     Allergic rhinitis due to other allergen     Human immunodeficiency virus (HIV) disease ([facility]     Hypothyroidism, unsp Fall/Risk Scorin          Depression Screening (PHQ-2/PHQ-9): Over the LAST 2 WEEKS   Little interest or pleasure in doing things (over the last two weeks)?: Not at all    Feeling down, depressed, or hopeless (over th MCG/ACT Inhalation Aero Soln INHALE 2 PUFFS INTO THE LUNGS EVERY 4 TO 6 HOURS AS NEEDED FOR WHEEZING Disp: 18 g Rfl: 0   Darunavir-Cobicistat (PREZCOBIX) 800-150 MG Oral Tab Take 1 tablet by mouth daily.  Disp: 30 tablet Rfl: 0   Betamethasone Dipropionate cancer) Maternal Cousin Male         pre josefa      SOCIAL HISTORY:   Social History    Tobacco Use      Smoking status: Never Smoker      Smokeless tobacco: Never Used    Alcohol use: Yes      Comment: occasional wine    Drug use: No    Occ: woking  deferred  MUSCULOSKELETAL: back is not tender, FROM of the back  EXTREMITIES: no cyanosis, clubbing or edema.   NEURO: Oriented times three, cranial nerves are intact, motor and sensory are grossly intact    ASSESSMENT AND OTHER RELEVANT CHRONIC CONDITIONS: External Lab or Procedure   Diabetes Screening      HbgA1C   Annually No results found for: A1C No flowsheet data found.     Fasting Blood Sugar (FSB)Annually No results found for: GLUCOSE    Cardiovascular Disease Screening     LDL Annually LDL Cholesterol anticonvulsants.)    Potassium  Annually Potassium (mmol/L)   Date Value   09/05/2019 4.47    No flowsheet data found. Creatinine  Annually Creatinine (mg/dL)   Date Value   09/05/2019 0.50    No flowsheet data found.     BUN  Annually Blood Urea Nitroge History  Administered            Date(s) Administered    FLU VACC High Dose 65 YRS & Older PRSV Free (73534)                          09/20/2018 09/05/2019      FLUAD High Dose 72 yr and older (30727)                          01/17/2018      FLUZONE 3 Yrs

## 2019-09-14 NOTE — PATIENT INSTRUCTIONS
ANGELA Cha's SCREENING SCHEDULE   Tests on this list are recommended by your physician but may not be covered, or covered at this frequency, by your insurer. Please check with your insurance carrier before scheduling to verify coverage.    PREVENT more often if abnormal Colonoscopy due on 10/23/2027 Update [facility] if applicable    Flex Sigmoidoscopy Screen  Covered every 5 years No results found for this or any previous visit. No flowsheet data found.      Fecal Occult Blood   Covered Nida PRSV 4 TEE 3 YRS+    Please get every year    Pneumococcal 13 (Prevnar)  Covered Once after 65 Orders placed or performed in visit on 07/15/17   • PNEUMOCOCCAL VACC, 13 TEE IM    Please get once after your 65th birthday    Pneumococcal 23 (Pneumovax)  [place]

## 2019-10-11 DIAGNOSIS — J45.30 MILD PERSISTENT EXTRINSIC ASTHMA WITHOUT COMPLICATION: Primary | ICD-10-CM

## 2019-10-11 RX ORDER — FLUTICASONE FUROATE 100 UG/1
POWDER RESPIRATORY (INHALATION)
Qty: 3 EACH | Refills: 1 | Status: SHIPPED | OUTPATIENT
Start: 2019-10-11 | End: 2020-08-20 | Stop reason: ALTCHOICE

## 2019-10-11 NOTE — TELEPHONE ENCOUNTER
Pt last seen in Allergy 9/5/2019 for . . .      Asthma, extrinsic, unspecified asthma severity, uncomplicated  (primary encounter diagnosis)  Allergic rhinitis due to dust mite  Allergic rhinitis due to animal dander    Refill request received for . . .

## 2019-10-19 ENCOUNTER — APPOINTMENT (OUTPATIENT)
Dept: LAB | Age: 69
End: 2019-10-19
Attending: PHYSICIAN ASSISTANT
Payer: MEDICARE

## 2019-10-19 DIAGNOSIS — E78.2 MIXED HYPERLIPIDEMIA: ICD-10-CM

## 2019-10-19 DIAGNOSIS — E03.9 HYPOTHYROIDISM, UNSPECIFIED TYPE: ICD-10-CM

## 2019-10-19 PROCEDURE — 84443 ASSAY THYROID STIM HORMONE: CPT

## 2019-10-19 PROCEDURE — 36415 COLL VENOUS BLD VENIPUNCTURE: CPT

## 2019-10-19 PROCEDURE — 80061 LIPID PANEL: CPT

## 2019-10-21 ENCOUNTER — PATIENT MESSAGE (OUTPATIENT)
Dept: INTERNAL MEDICINE CLINIC | Facility: CLINIC | Age: 69
End: 2019-10-21

## 2019-10-21 DIAGNOSIS — Z51.81 ENCOUNTER FOR THERAPEUTIC DRUG MONITORING: ICD-10-CM

## 2019-10-21 DIAGNOSIS — E78.2 MIXED HYPERLIPIDEMIA: Primary | ICD-10-CM

## 2019-10-22 RX ORDER — ATORVASTATIN CALCIUM 10 MG/1
10 TABLET, FILM COATED ORAL NIGHTLY
Qty: 30 TABLET | Refills: 3 | Status: SHIPPED | OUTPATIENT
Start: 2019-10-22 | End: 2020-08-20 | Stop reason: ALTCHOICE

## 2019-10-22 NOTE — TELEPHONE ENCOUNTER
From: Elizabet Taylor  To: Corky Tobin PA-C  Sent: 10/21/2019 1:24 PM CDT  Subject: Test Results Question    I see my cholesterol results and not happy. I am risk for heart disease I do eat healthy and watch what I eat cook meals at home.  I was once to

## 2019-10-22 NOTE — TELEPHONE ENCOUNTER
Patient has viewed result notes per AIMEE COWART, pt following up with concerns may be related to her HIV medications, please advise.

## 2019-10-31 RX ORDER — LEVOCETIRIZINE DIHYDROCHLORIDE 5 MG/1
TABLET, FILM COATED ORAL
Qty: 90 TABLET | Refills: 0 | Status: SHIPPED | OUTPATIENT
Start: 2019-10-31 | End: 2020-04-30

## 2019-10-31 NOTE — TELEPHONE ENCOUNTER
Spoke to patient and informed her that she is due for a follow up in March 2020. Pt verbalizes her understanding, and will call back to schedule appointment.

## 2019-10-31 NOTE — TELEPHONE ENCOUNTER
Refill requested for   Name from pharmacy: LEVOCETIRIZINE 5MG TABLETS          Will file in chart as: LEVOCETIRIZINE DIHYDROCHLORIDE 5 MG Oral Tab         Sig: TAKE 1 TABLET BY MOUTH EVERY NIGHT AT BEDTIME    Disp:  30 tablet    Refills:  0    Start: 10/31

## 2019-11-25 ENCOUNTER — PATIENT MESSAGE (OUTPATIENT)
Dept: INTERNAL MEDICINE CLINIC | Facility: CLINIC | Age: 69
End: 2019-11-25

## 2019-11-25 NOTE — TELEPHONE ENCOUNTER
From: Chantel Mehta  To: Cassandra Palafox PA-C  Sent: 11/25/2019 6:25 AM CST  Subject: Other    Are you available 11-29 or 11-30.

## 2019-11-25 NOTE — TELEPHONE ENCOUNTER
WES Do, please see her brief message. Last OV 9/14/19. I called patient, LMTCB transfer to triage. Sent MyChart to call if symptoms, or if wants follow-up appt to schedule on MyChart or call CS.

## 2019-12-04 ENCOUNTER — NURSE TRIAGE (OUTPATIENT)
Dept: INTERNAL MEDICINE CLINIC | Facility: CLINIC | Age: 69
End: 2019-12-04

## 2019-12-04 NOTE — TELEPHONE ENCOUNTER
Action Requested: Summary for Provider     []  Critical Lab, Recommendations Needed  [] Need Additional Advice  []   FYI    []   Need Orders  [] Need Medications Sent to Pharmacy  []  Other     SUMMARY: Appointment made with Breana keenan 12/5/19 at 08

## 2019-12-04 NOTE — TELEPHONE ENCOUNTER
----- Message from Martha Dean sent at 12/4/2019  7:53 AM CST -----  Regarding: RE: Other  Contact: [phone]  I tried going  thru my chart but it only Showa two different providers  I would like to see you if possible I have been feeling ill and

## 2019-12-06 ENCOUNTER — OFFICE VISIT (OUTPATIENT)
Dept: INTERNAL MEDICINE CLINIC | Facility: CLINIC | Age: 69
End: 2019-12-06
Payer: MEDICARE

## 2019-12-06 VITALS
BODY MASS INDEX: 28 KG/M2 | DIASTOLIC BLOOD PRESSURE: 71 MMHG | HEART RATE: 76 BPM | HEIGHT: 57 IN | TEMPERATURE: 98 F | SYSTOLIC BLOOD PRESSURE: 107 MMHG

## 2019-12-06 DIAGNOSIS — E03.9 HYPOTHYROIDISM, UNSPECIFIED TYPE: ICD-10-CM

## 2019-12-06 DIAGNOSIS — J30.9 ALLERGIC RHINITIS, UNSPECIFIED SEASONALITY, UNSPECIFIED TRIGGER: Primary | ICD-10-CM

## 2019-12-06 PROCEDURE — G0463 HOSPITAL OUTPT CLINIC VISIT: HCPCS | Performed by: PHYSICIAN ASSISTANT

## 2019-12-06 PROCEDURE — 99213 OFFICE O/P EST LOW 20 MIN: CPT | Performed by: PHYSICIAN ASSISTANT

## 2019-12-06 RX ORDER — MONTELUKAST SODIUM 10 MG/1
10 TABLET ORAL NIGHTLY
Qty: 30 TABLET | Refills: 1 | Status: SHIPPED | OUTPATIENT
Start: 2019-12-06 | End: 2019-12-08

## 2019-12-06 RX ORDER — LEVOTHYROXINE SODIUM 0.07 MG/1
TABLET ORAL
Qty: 90 TABLET | Refills: 0 | Status: SHIPPED | OUTPATIENT
Start: 2019-12-06 | End: 2020-05-07

## 2019-12-06 NOTE — PROGRESS NOTES
HPI:    Patient ID: Ekta Salinas is a 81year old female. HPI    Patient presents with a few weeks of ongoing nasal congestion, postnasal drip and rhinorrhea. Does have a history of allergies.   Patient is currently on nasal spray, and inhaler as Propionate (FLONASE) 50 MCG/ACT Nasal Suspension 2 sprays by Nasal route daily.  3 Bottle 1   • ALBUTEROL SULFATE  (90 Base) MCG/ACT Inhalation Aero Soln INHALE 2 PUFFS INTO THE LUNGS EVERY 4 TO 6 HOURS AS NEEDED FOR WHEEZING 18 g 0   • Darunavir-Cob pre josefa         PHYSICAL EXAM:   /71 (BP Location: Right arm, Patient Position: Sitting, Cuff Size: adult)   Pulse 76   Temp 97.7 °F (36.5 °C) (Oral)   Ht 4' 9\" (1.448 m)   LMP  (LMP Unknown)   BMI 27.92 kg/m²   Body mass index is 27.92 kg/m

## 2019-12-08 RX ORDER — MONTELUKAST SODIUM 10 MG/1
TABLET ORAL
Qty: 90 TABLET | Refills: 0 | Status: SHIPPED | OUTPATIENT
Start: 2019-12-08 | End: 2020-04-29

## 2020-01-13 ENCOUNTER — OFFICE VISIT (OUTPATIENT)
Dept: INTERNAL MEDICINE CLINIC | Facility: CLINIC | Age: 70
End: 2020-01-13
Payer: MEDICARE

## 2020-01-13 ENCOUNTER — LAB ENCOUNTER (OUTPATIENT)
Dept: LAB | Age: 70
End: 2020-01-13
Attending: PHYSICIAN ASSISTANT
Payer: MEDICARE

## 2020-01-13 ENCOUNTER — APPOINTMENT (OUTPATIENT)
Dept: LAB | Age: 70
End: 2020-01-13
Attending: PHYSICIAN ASSISTANT
Payer: MEDICARE

## 2020-01-13 VITALS
DIASTOLIC BLOOD PRESSURE: 88 MMHG | OXYGEN SATURATION: 98 % | HEART RATE: 70 BPM | WEIGHT: 128 LBS | BODY MASS INDEX: 27.61 KG/M2 | SYSTOLIC BLOOD PRESSURE: 146 MMHG | HEIGHT: 57 IN

## 2020-01-13 DIAGNOSIS — R68.83 CHILLS: ICD-10-CM

## 2020-01-13 DIAGNOSIS — R06.02 SHORTNESS OF BREATH ON EXERTION: ICD-10-CM

## 2020-01-13 DIAGNOSIS — R53.83 OTHER FATIGUE: ICD-10-CM

## 2020-01-13 DIAGNOSIS — R68.83 CHILLS: Primary | ICD-10-CM

## 2020-01-13 DIAGNOSIS — Z51.81 ENCOUNTER FOR THERAPEUTIC DRUG MONITORING: ICD-10-CM

## 2020-01-13 DIAGNOSIS — E78.2 MIXED HYPERLIPIDEMIA: ICD-10-CM

## 2020-01-13 LAB
ALBUMIN SERPL-MCNC: 3.9 G/DL (ref 3.4–5)
ALP LIVER SERPL-CCNC: 108 U/L (ref 55–142)
ALT SERPL-CCNC: 21 U/L (ref 13–56)
AST SERPL-CCNC: 20 U/L (ref 15–37)
BASOPHILS # BLD AUTO: 0.04 X10(3) UL (ref 0–0.2)
BASOPHILS NFR BLD AUTO: 0.6 %
BILIRUB DIRECT SERPL-MCNC: <0.1 MG/DL (ref 0–0.2)
BILIRUB SERPL-MCNC: 0.3 MG/DL (ref 0.1–2)
BILIRUB UR QL: NEGATIVE
CLARITY UR: CLEAR
COLOR UR: YELLOW
DEPRECATED RDW RBC AUTO: 41.8 FL (ref 35.1–46.3)
EOSINOPHIL # BLD AUTO: 0.02 X10(3) UL (ref 0–0.7)
EOSINOPHIL NFR BLD AUTO: 0.3 %
ERYTHROCYTE [DISTWIDTH] IN BLOOD BY AUTOMATED COUNT: 12.7 % (ref 11–15)
GLUCOSE UR-MCNC: NEGATIVE MG/DL
HCT VFR BLD AUTO: 42.3 % (ref 35–48)
HGB BLD-MCNC: 13.9 G/DL (ref 12–16)
HGB UR QL STRIP.AUTO: NEGATIVE
IMM GRANULOCYTES # BLD AUTO: 0.05 X10(3) UL (ref 0–1)
IMM GRANULOCYTES NFR BLD: 0.8 %
KETONES UR-MCNC: NEGATIVE MG/DL
LYMPHOCYTES # BLD AUTO: 1.29 X10(3) UL (ref 1–4)
LYMPHOCYTES NFR BLD AUTO: 20.4 %
M PROTEIN MFR SERPL ELPH: 7.5 G/DL (ref 6.4–8.2)
MCH RBC QN AUTO: 30.1 PG (ref 26–34)
MCHC RBC AUTO-ENTMCNC: 32.9 G/DL (ref 31–37)
MCV RBC AUTO: 91.6 FL (ref 80–100)
MONOCYTES # BLD AUTO: 0.62 X10(3) UL (ref 0.1–1)
MONOCYTES NFR BLD AUTO: 9.8 %
NEUTROPHILS # BLD AUTO: 4.3 X10 (3) UL (ref 1.5–7.7)
NEUTROPHILS # BLD AUTO: 4.3 X10(3) UL (ref 1.5–7.7)
NEUTROPHILS NFR BLD AUTO: 68.1 %
NITRITE UR QL STRIP.AUTO: NEGATIVE
PH UR: 7 [PH] (ref 5–8)
PLATELET # BLD AUTO: 241 10(3)UL (ref 150–450)
PROT UR-MCNC: NEGATIVE MG/DL
RBC # BLD AUTO: 4.62 X10(6)UL (ref 3.8–5.3)
RBC #/AREA URNS AUTO: 1 /HPF
SP GR UR STRIP: 1.01 (ref 1–1.03)
TSI SER-ACNC: 1.08 MIU/ML (ref 0.36–3.74)
UROBILINOGEN UR STRIP-ACNC: <2
WBC # BLD AUTO: 6.3 X10(3) UL (ref 4–11)
WBC #/AREA URNS AUTO: 2 /HPF

## 2020-01-13 PROCEDURE — 81001 URINALYSIS AUTO W/SCOPE: CPT

## 2020-01-13 PROCEDURE — 80076 HEPATIC FUNCTION PANEL: CPT

## 2020-01-13 PROCEDURE — 93010 ELECTROCARDIOGRAM REPORT: CPT | Performed by: PHYSICIAN ASSISTANT

## 2020-01-13 PROCEDURE — G0463 HOSPITAL OUTPT CLINIC VISIT: HCPCS | Performed by: PHYSICIAN ASSISTANT

## 2020-01-13 PROCEDURE — 84443 ASSAY THYROID STIM HORMONE: CPT

## 2020-01-13 PROCEDURE — 93005 ELECTROCARDIOGRAM TRACING: CPT

## 2020-01-13 PROCEDURE — 99213 OFFICE O/P EST LOW 20 MIN: CPT | Performed by: PHYSICIAN ASSISTANT

## 2020-01-13 PROCEDURE — 85025 COMPLETE CBC W/AUTO DIFF WBC: CPT

## 2020-01-13 PROCEDURE — 36415 COLL VENOUS BLD VENIPUNCTURE: CPT

## 2020-01-13 NOTE — PROGRESS NOTES
HPI:    Patient ID: Nando Neely is a 81year old female. Cough   Associated symptoms include shortness of breath.       Patient presents complaining of increased shortness of breath on exertion and notes her inhalers are not doing the trick anymor • Etravirine (INTELENCE) 200 MG Oral Tab Take 1 tablet by mouth 2 (two) times daily. 60 tablet 5   • Albuterol Sulfate HFA (PROAIR HFA) 108 (90 Base) MCG/ACT Inhalation Aero Soln Inhale 2 puffs into the lungs every 6 (six) hours as needed for Wheezing.  1 Right 8/2/2018    Performed by Dallas Hussein MD at [address] [facility] MAIN OR   • EYE SURGERY Right 2003   \  Family History   Problem Relation Age of Onset   • Hypertension Father    • Diabetes Sister    • Other (cervical cancer) Maternal Cousin Male         pre men shortness of breath patient is go to the hospital.  - EKG 12-LEAD; Future    3. Other fatigue  - CBC WITH DIFFERENTIAL WITH PLATELET; Future  - COMP METABOLIC PANEL (14);  Future    Orders Placed This Encounter      CBC W Differential W Platelet [E]      Co

## 2020-01-15 ENCOUNTER — TELEPHONE (OUTPATIENT)
Dept: ALLERGY | Facility: CLINIC | Age: 70
End: 2020-01-15

## 2020-01-15 NOTE — TELEPHONE ENCOUNTER
Call reviewed and noted. Agree with triage advice provided including urgent care or ED evaluation if having active or acute symptoms. Albuterol every 4-6 hours as needed.

## 2020-01-15 NOTE — TELEPHONE ENCOUNTER
RAYMOND to Dr. Shirley Shannon. We were alerted by the phone room of patient self-scheduling with c/o SOB.      LOV:  19 for AR and Asthma    Left detailed message to notify her we are calling to f/u with her to see if due to c/o SOB, if she can wait until tomor

## 2020-01-16 ENCOUNTER — APPOINTMENT (OUTPATIENT)
Dept: LAB | Age: 70
End: 2020-01-16
Attending: ALLERGY & IMMUNOLOGY
Payer: MEDICARE

## 2020-01-16 ENCOUNTER — OFFICE VISIT (OUTPATIENT)
Dept: ALLERGY | Facility: CLINIC | Age: 70
End: 2020-01-16
Payer: MEDICARE

## 2020-01-16 ENCOUNTER — HOSPITAL ENCOUNTER (OUTPATIENT)
Dept: GENERAL RADIOLOGY | Facility: HOSPITAL | Age: 70
Discharge: HOME | End: 2020-01-16
Attending: ALLERGY & IMMUNOLOGY | Admitting: ALLERGY & IMMUNOLOGY
Payer: MEDICARE

## 2020-01-16 VITALS
HEIGHT: 56 IN | OXYGEN SATURATION: 98 % | BODY MASS INDEX: 28.57 KG/M2 | WEIGHT: 127 LBS | HEART RATE: 78 BPM | DIASTOLIC BLOOD PRESSURE: 83 MMHG | RESPIRATION RATE: 18 BRPM | SYSTOLIC BLOOD PRESSURE: 165 MMHG | TEMPERATURE: 98 F

## 2020-01-16 DIAGNOSIS — J30.89 ALLERGIC RHINITIS DUE TO DUST MITE: ICD-10-CM

## 2020-01-16 DIAGNOSIS — R06.02 SOB (SHORTNESS OF BREATH): ICD-10-CM

## 2020-01-16 DIAGNOSIS — J45.30 MILD PERSISTENT EXTRINSIC ASTHMA WITHOUT COMPLICATION: ICD-10-CM

## 2020-01-16 DIAGNOSIS — J45.40 MODERATE PERSISTENT EXTRINSIC ASTHMA WITHOUT COMPLICATION: ICD-10-CM

## 2020-01-16 DIAGNOSIS — J30.81 ALLERGIC RHINITIS DUE TO ANIMAL DANDER: ICD-10-CM

## 2020-01-16 DIAGNOSIS — J45.40 MODERATE PERSISTENT EXTRINSIC ASTHMA WITHOUT COMPLICATION: Primary | ICD-10-CM

## 2020-01-16 LAB — D DIMER PPP FEU-MCNC: <0.27 UG/ML FEU (ref ?–0.67)

## 2020-01-16 PROCEDURE — G0463 HOSPITAL OUTPT CLINIC VISIT: HCPCS | Performed by: ALLERGY & IMMUNOLOGY

## 2020-01-16 PROCEDURE — 94010 BREATHING CAPACITY TEST: CPT | Performed by: ALLERGY & IMMUNOLOGY

## 2020-01-16 PROCEDURE — 99214 OFFICE O/P EST MOD 30 MIN: CPT | Performed by: ALLERGY & IMMUNOLOGY

## 2020-01-16 PROCEDURE — 85379 FIBRIN DEGRADATION QUANT: CPT

## 2020-01-16 PROCEDURE — 36415 COLL VENOUS BLD VENIPUNCTURE: CPT

## 2020-01-16 PROCEDURE — 71046 X-RAY EXAM CHEST 2 VIEWS: CPT | Performed by: ALLERGY & IMMUNOLOGY

## 2020-01-16 RX ORDER — MONTELUKAST SODIUM 10 MG/1
10 TABLET ORAL NIGHTLY
Qty: 90 TABLET | Refills: 1 | Status: SHIPPED | OUTPATIENT
Start: 2020-01-16 | End: 2020-04-29

## 2020-01-16 NOTE — PROGRESS NOTES
Yaquelin Minor is a 81year old female. HPI:   Patient presents with:  Asthma: Pt presents with concern of asthma flare, pt last seen in Allergy 9/2019. She offers that peak flow readings have been reduced to 300, down from over 400 normally. ESOPHAGOGASTRODUODENOSCOPY (EGD) N/A 3/23/2018    Performed by Mima Pinto MD at [address] [place] Right 8/2/2018    Performed by Yuli Santiago MD at [address] [facility] MAIN OR   • EYE SURGERY Right 2003      Family History   Problem Relati MCG/ACT Inhalation Aero Soln INHALE 2 PUFFS INTO THE LUNGS EVERY 4 TO 6 HOURS AS NEEDED FOR WHEEZING 18 g 0   • Darunavir-Cobicistat (PREZCOBIX) 800-150 MG Oral Tab Take 1 tablet by mouth daily.  30 tablet 0   • Betamethasone Dipropionate 0.05 % External Oi gallups, or rubs  Abdomen: soft non-tender non-distended  Skin/Hair: no unusual rashes present   Extremities: no edema, cyanosis, or clubbing     ASSESSMENT/PLAN:   Assessment   Moderate persistent extrinsic asthma without complication  (primary encounter medication administration and dosing and potential side effects.  Teaching was provided via the teach back method

## 2020-01-16 NOTE — PATIENT INSTRUCTIONS
1. Asthma/sob  Check chest x-ray. Check d-dimer  Change Arnuity to Breo 100/25 1 puff once a day  Start Singulair 10 mg once a day  Albuterol every 4-6 hours as needed  Flu vaccine up-to-date    2.  Ar  Patient will be on Singulair as well for underlying a

## 2020-01-17 ENCOUNTER — TELEPHONE (OUTPATIENT)
Dept: ALLERGY | Facility: CLINIC | Age: 70
End: 2020-01-17

## 2020-01-17 RX ORDER — BUDESONIDE AND FORMOTEROL FUMARATE DIHYDRATE 160; 4.5 UG/1; UG/1
2 AEROSOL RESPIRATORY (INHALATION) 2 TIMES DAILY
Qty: 1 INHALER | Refills: 2 | Status: SHIPPED | OUTPATIENT
Start: 2020-01-17 | End: 2020-02-06

## 2020-01-17 NOTE — TELEPHONE ENCOUNTER
•  Fluticasone Furoate-Vilanterol (BREO ELLIPTA) 100-25 MCG/INH Inhalation Aerosol Powder, Breath Activated, Inhale 1 puff into the lungs daily. , Disp: 1 each, Rfl: 0

## 2020-01-17 NOTE — TELEPHONE ENCOUNTER
[facility] in [place] on [address] contacted. Pharmacy rep offers that Lorrin Ho is not covered under Medicare Drug Formulary. Rep offers that Symbicort is the covered alternative- both strengths.      Dr. Andrea Aragon, please advise

## 2020-01-18 ENCOUNTER — TELEPHONE (OUTPATIENT)
Dept: ALLERGY | Facility: CLINIC | Age: 70
End: 2020-01-18

## 2020-01-18 NOTE — TELEPHONE ENCOUNTER
RN went over both D-dimer and Chest Xray results. Patient has no further questions and will keep us updated with status.      ----- Message from Mac Waite MD sent at 1/18/2020  7:41 AM CST -----  Please call patient with unremarkable chest x-ray.   Ronnie Trotter

## 2020-01-18 NOTE — TELEPHONE ENCOUNTER
----- Message from Jaime Szymanski MD sent at 1/18/2020  7:41 AM CST -----  Please call patient with normal d-dimer. This is good news. Blood clot would be unlikely with a normal d-dimer.

## 2020-02-06 RX ORDER — FLUTICASONE PROPIONATE AND SALMETEROL 113; 14 UG/1; UG/1
1 POWDER, METERED RESPIRATORY (INHALATION) 2 TIMES DAILY
Qty: 1 EACH | Refills: 0 | Status: SHIPPED | OUTPATIENT
Start: 2020-02-06 | End: 2021-04-27

## 2020-02-06 NOTE — TELEPHONE ENCOUNTER
RN called patient to notify her of new RX for Airduo. Just to clairify, should patient discontinue using the Arnuity as she will begin using Airduo?

## 2020-02-06 NOTE — TELEPHONE ENCOUNTER
Refill requested for   Name from pharmacy: [address] [id number]          Will file in chart as: VENTOLIN  (90 Base) MCG/ACT Inhalation Aero Soln         Sig: INHALE 2 PUFFS INTO THE LUNGS EVERY 6 HOURS AS NEEDED FOR WHEEZING    Disp:

## 2020-02-06 NOTE — TELEPHONE ENCOUNTER
RN called to check on patient's asthma status. Patient reports that she has had some anxiety due to work that may have been causing increased use of Ventolin inhaler. Denies any current shortness of breath, wheezing, tightness in her chest or chest pain.

## 2020-02-06 NOTE — TELEPHONE ENCOUNTER
RN called patient to notify her that she should use the Airduo in place of the Arnuity if covered as well as the Ventolin every 6 hours PRN. Patient verbalizes understanding and has no further questions.

## 2020-02-06 NOTE — TELEPHONE ENCOUNTER
We can place a prescription for airduo  (fluticasone/albuterol 113/14 1 puff twice a day to see if more affordable than Symbicort or Breo.  rx sent

## 2020-02-07 ENCOUNTER — TELEPHONE (OUTPATIENT)
Dept: ALLERGY | Facility: CLINIC | Age: 70
End: 2020-02-07

## 2020-02-07 NOTE — TELEPHONE ENCOUNTER
Fax received from [place] stating that a Prior Authorization for York Moreno is needed. Per 1/17/2020 Telephone Encounter, York Moreno was not covered under Medicare Drug Formulary and Symbicort had been ordered in it's place as that was covered.

## 2020-03-04 RX ORDER — ALBUTEROL SULFATE 90 UG/1
AEROSOL, METERED RESPIRATORY (INHALATION)
Qty: 18 G | Refills: 0 | OUTPATIENT
Start: 2020-03-04

## 2020-03-04 NOTE — TELEPHONE ENCOUNTER
Refill requested for   VENTOLIN  (90 Base) MCG/ACT Inhalation Aero Soln 18 g 0 2/6/2020    Sig:   INHALE 2 PUFFS INTO THE LUNGS EVERY 6 HOURS AS NEEDED FOR WHEEZING         Last office visit: 1/16/20    Previously advised to follow up in:  Please cl

## 2020-03-07 ENCOUNTER — HOSPITAL ENCOUNTER (OUTPATIENT)
Dept: MAMMOGRAPHY | Facility: HOSPITAL | Age: 70
End: 2020-03-07
Attending: INTERNAL MEDICINE
Payer: MEDICARE

## 2020-04-07 ENCOUNTER — NURSE TRIAGE (OUTPATIENT)
Dept: OTHER | Age: 70
End: 2020-04-07

## 2020-04-07 NOTE — TELEPHONE ENCOUNTER
Action Requested: Summary for Provider     []  Critical Lab, Recommendations Needed  [] Need Additional Advice  []   FYI    []   Need Orders  [] Need Medications Sent to Pharmacy  []  Other     SUMMARY:   Patient calling with chills on and off for 2 weeks.

## 2020-04-07 NOTE — TELEPHONE ENCOUNTER
----- Message from Elizabet Taylor sent at 4/7/2020  6:39 AM CDT -----  Regarding: Non-Urgent Medical Question  Contact: [phone]  Good morning, I have been getting chills on and off with no fever. Today my chills are more than before.  I don't know

## 2020-04-29 ENCOUNTER — E-VISIT (OUTPATIENT)
Dept: FAMILY MEDICINE CLINIC | Facility: CLINIC | Age: 70
End: 2020-04-29

## 2020-04-29 DIAGNOSIS — J40 BRONCHITIS: Primary | ICD-10-CM

## 2020-04-29 PROCEDURE — 99421 OL DIG E/M SVC 5-10 MIN: CPT | Performed by: NURSE PRACTITIONER

## 2020-04-29 RX ORDER — BENZONATATE 200 MG/1
200 CAPSULE ORAL 3 TIMES DAILY PRN
Qty: 20 CAPSULE | Refills: 0 | Status: SHIPPED | OUTPATIENT
Start: 2020-04-29 | End: 2020-08-20 | Stop reason: ALTCHOICE

## 2020-04-29 NOTE — PROGRESS NOTES
This is a telemedicine visit with live, interactive video and audio. Patient understands and accepts financial responsibility for any deductible, co-insurance and/or co-pays associated with this service.     SUBJECTIVE  Patient presents for video visit Smoker      Smokeless tobacco: Never Used      Tobacco comment: Pt denies passive smoke exposure in her home.      Alcohol use: Yes      Comment: occasional wine    Drug use: No         Dust Mite Extract       ITCHING   Current Outpatient Medications   Medi TO RASH BID FOR 2 WEEKS MAX PER EPISODE  0   • FLUTICASONE PROPIONATE 50 MCG/ACT Nasal Suspension SHAKE LIQUID AND USE 2 SPRAYS IN EACH NOSTRIL DAILY 1 Bottle 0       OBJECTIVE  Physical Exam:   alert, appears stated age and cooperative, Normocephalic, wit

## 2020-04-30 ENCOUNTER — LAB ENCOUNTER (OUTPATIENT)
Dept: LAB | Facility: HOSPITAL | Age: 70
End: 2020-04-30
Attending: PHYSICIAN ASSISTANT
Payer: MEDICARE

## 2020-04-30 DIAGNOSIS — E03.9 HYPOTHYROIDISM, UNSPECIFIED TYPE: ICD-10-CM

## 2020-04-30 DIAGNOSIS — R68.83 CHILLS: ICD-10-CM

## 2020-04-30 DIAGNOSIS — R53.83 OTHER FATIGUE: ICD-10-CM

## 2020-04-30 DIAGNOSIS — E78.2 MIXED HYPERLIPIDEMIA: ICD-10-CM

## 2020-04-30 DIAGNOSIS — Z51.81 ENCOUNTER FOR THERAPEUTIC DRUG MONITORING: ICD-10-CM

## 2020-04-30 DIAGNOSIS — B20 HIV INFECTION, UNSPECIFIED SYMPTOM STATUS (HCC): ICD-10-CM

## 2020-04-30 PROCEDURE — 85025 COMPLETE CBC W/AUTO DIFF WBC: CPT

## 2020-04-30 PROCEDURE — 86360 T CELL ABSOLUTE COUNT/RATIO: CPT

## 2020-04-30 PROCEDURE — 86780 TREPONEMA PALLIDUM: CPT

## 2020-04-30 PROCEDURE — 87591 N.GONORRHOEAE DNA AMP PROB: CPT

## 2020-04-30 PROCEDURE — 80061 LIPID PANEL: CPT

## 2020-04-30 PROCEDURE — 87491 CHLMYD TRACH DNA AMP PROBE: CPT

## 2020-04-30 PROCEDURE — 80053 COMPREHEN METABOLIC PANEL: CPT

## 2020-04-30 PROCEDURE — 36415 COLL VENOUS BLD VENIPUNCTURE: CPT

## 2020-04-30 RX ORDER — LEVOCETIRIZINE DIHYDROCHLORIDE 5 MG/1
TABLET, FILM COATED ORAL
Qty: 90 TABLET | Refills: 0 | Status: SHIPPED | OUTPATIENT
Start: 2020-04-30 | End: 2022-01-11

## 2020-04-30 RX ORDER — LEVOTHYROXINE SODIUM 0.07 MG/1
TABLET ORAL
Qty: 90 TABLET | Refills: 0 | OUTPATIENT
Start: 2020-04-30

## 2020-04-30 NOTE — TELEPHONE ENCOUNTER
Refill requested for    Name from pharmacy: LEVOCETIRIZINE 5MG TABLETS         Will file in chart as: LEVOCETIRIZINE DIHYDROCHLORIDE 5 MG Oral Tab         Sig: TAKE 1 TABLET BY MOUTH EVERY NIGHT AT BEDTIME    Disp:  90 tablet    Refills:  0 (Pharmacy reque

## 2020-04-30 NOTE — TELEPHONE ENCOUNTER
Called and spoke with patient, reviewed Dr. Harmon Captain message as below. She verbalizes understanding, questions answered.

## 2020-05-07 ENCOUNTER — PATIENT MESSAGE (OUTPATIENT)
Dept: INTERNAL MEDICINE CLINIC | Facility: CLINIC | Age: 70
End: 2020-05-07

## 2020-05-07 DIAGNOSIS — E03.9 HYPOTHYROIDISM, UNSPECIFIED TYPE: ICD-10-CM

## 2020-05-07 RX ORDER — LEVOTHYROXINE SODIUM 0.07 MG/1
75 TABLET ORAL
Qty: 90 TABLET | Refills: 0 | Status: SHIPPED | OUTPATIENT
Start: 2020-05-07 | End: 2020-08-04

## 2020-05-07 NOTE — TELEPHONE ENCOUNTER
From: Keshav Guillory  To: Qi Blair PA-C  Sent: 5/7/2020 1:24 PM CDT  Subject: Prescription Question    I need my thyroid medicine refilled. Is this something you can do.

## 2020-08-02 DIAGNOSIS — E03.9 HYPOTHYROIDISM, UNSPECIFIED TYPE: ICD-10-CM

## 2020-08-04 RX ORDER — LEVOTHYROXINE SODIUM 0.07 MG/1
TABLET ORAL
Qty: 90 TABLET | Refills: 0 | Status: SHIPPED | OUTPATIENT
Start: 2020-08-04 | End: 2020-10-21

## 2020-08-19 ENCOUNTER — NURSE TRIAGE (OUTPATIENT)
Dept: INTERNAL MEDICINE CLINIC | Facility: CLINIC | Age: 70
End: 2020-08-19

## 2020-08-19 ENCOUNTER — PATIENT MESSAGE (OUTPATIENT)
Dept: INTERNAL MEDICINE CLINIC | Facility: CLINIC | Age: 70
End: 2020-08-19

## 2020-08-19 NOTE — TELEPHONE ENCOUNTER
Action Requested: Summary for Provider     []  Critical Lab, Recommendations Needed  [] Need Additional Advice  []   FYI    []   Need Orders  [] Need Medications Sent to Pharmacy  []  Other     SUMMARY:    Appointment made for tomorrow 8/20/20    The patie

## 2020-08-19 NOTE — TELEPHONE ENCOUNTER
----- Message from Jason Killer sent at 8/19/2020  9:02 AM CDT -----  Regarding: Non-Urgent Medical Question  Contact: [phone]  My feet hurt on the top and knee. Now my right hand hurts as well. Can you recommend a orthopedic?

## 2020-08-19 NOTE — TELEPHONE ENCOUNTER
From: Fritz Allen  To: Pablo Barclay PA-C  Sent: 8/19/2020 9:02 AM CDT  Subject: Non-Urgent Medical Question    My feet hurt on the top and knee. Now my right hand hurts as well. Can you recommend a orthopedic?

## 2020-08-20 ENCOUNTER — OFFICE VISIT (OUTPATIENT)
Dept: INTERNAL MEDICINE CLINIC | Facility: CLINIC | Age: 70
End: 2020-08-20
Payer: MEDICARE

## 2020-08-20 ENCOUNTER — HOSPITAL ENCOUNTER (OUTPATIENT)
Dept: GENERAL RADIOLOGY | Facility: HOSPITAL | Age: 70
Discharge: HOME | End: 2020-08-20
Attending: INTERNAL MEDICINE | Admitting: INTERNAL MEDICINE
Payer: MEDICARE

## 2020-08-20 ENCOUNTER — HOSPITAL ENCOUNTER (OUTPATIENT)
Dept: GENERAL RADIOLOGY | Facility: HOSPITAL | Age: 70
Discharge: HOME | End: 2020-08-20
Attending: INTERNAL MEDICINE
Payer: MEDICARE

## 2020-08-20 VITALS
TEMPERATURE: 97 F | HEIGHT: 56 IN | SYSTOLIC BLOOD PRESSURE: 121 MMHG | DIASTOLIC BLOOD PRESSURE: 83 MMHG | BODY MASS INDEX: 29.25 KG/M2 | WEIGHT: 130 LBS | HEART RATE: 89 BPM

## 2020-08-20 DIAGNOSIS — M79.672 CHRONIC PAIN OF BOTH FEET: ICD-10-CM

## 2020-08-20 DIAGNOSIS — R22.2 LOCALIZED SWELLING OF CHEST WALL: ICD-10-CM

## 2020-08-20 DIAGNOSIS — M17.0 PRIMARY OSTEOARTHRITIS OF BOTH KNEES: ICD-10-CM

## 2020-08-20 DIAGNOSIS — M79.671 CHRONIC PAIN OF BOTH FEET: ICD-10-CM

## 2020-08-20 DIAGNOSIS — M79.10 MUSCLE PAIN: ICD-10-CM

## 2020-08-20 DIAGNOSIS — M17.0 PRIMARY OSTEOARTHRITIS OF BOTH KNEES: Primary | ICD-10-CM

## 2020-08-20 DIAGNOSIS — G89.29 CHRONIC PAIN OF BOTH FEET: ICD-10-CM

## 2020-08-20 PROCEDURE — G0463 HOSPITAL OUTPT CLINIC VISIT: HCPCS | Performed by: INTERNAL MEDICINE

## 2020-08-20 PROCEDURE — 71101 X-RAY EXAM UNILAT RIBS/CHEST: CPT | Performed by: INTERNAL MEDICINE

## 2020-08-20 PROCEDURE — 99214 OFFICE O/P EST MOD 30 MIN: CPT | Performed by: INTERNAL MEDICINE

## 2020-08-20 PROCEDURE — 73562 X-RAY EXAM OF KNEE 3: CPT | Performed by: INTERNAL MEDICINE

## 2020-08-20 NOTE — PROGRESS NOTES
Dmitry Chavez is a 78year old female. Patient presents with:   Foot Pain: both feet  Knee Pain: right  Referral: orthepedic      HPI:   Pt comes as an urgent visit   C/c right knee and b/l feet and was seeing a foot and ankle dr but they left \"they Medical History:   Diagnosis Date   • Allergic rhinitis    • Anxiety state    • Asthma    • Blood disorder     anemia   • Disorder of thyroid     hypothyroidism   • Diverticulosis    • Esophageal reflux    • History of blood transfusion 1993    Edis breasts which she uses the ointment for, noted some mild swelling under the left breast rib cage area with mild tenderness no redness no masses felt this just as well  HEENT: atraumatic, normocephalic  LUNGS: clear to auscultation, no wheeze  CARDIO: RRR w

## 2020-08-20 NOTE — TELEPHONE ENCOUNTER
Patient reports has video visit today 10:50am with Dr Amber Cali, patient is preferring in office visit.  Denied exposure to covid or recent testing, reports chronic cough due to asthma and throat dryness, denied any other symptoms other than what was reported

## 2020-08-20 NOTE — TELEPHONE ENCOUNTER
Per follow up with Dr Hussain Decker pt is able to come in for office visit today, patient advised of change and will come in for visit today.

## 2020-09-01 ENCOUNTER — E-VISIT (OUTPATIENT)
Dept: TELEHEALTH | Age: 70
End: 2020-09-01

## 2020-09-01 ENCOUNTER — HOSPITAL ENCOUNTER (OUTPATIENT)
Age: 70
Discharge: HOME | End: 2020-09-01
Attending: EMERGENCY MEDICINE
Payer: MEDICARE

## 2020-09-01 VITALS
SYSTOLIC BLOOD PRESSURE: 141 MMHG | DIASTOLIC BLOOD PRESSURE: 82 MMHG | TEMPERATURE: 99 F | OXYGEN SATURATION: 98 % | RESPIRATION RATE: 16 BRPM | HEIGHT: 58 IN | BODY MASS INDEX: 27.29 KG/M2 | HEART RATE: 110 BPM | WEIGHT: 130 LBS

## 2020-09-01 DIAGNOSIS — Z02.9 ENCOUNTERS FOR ADMINISTRATIVE PURPOSES: Primary | ICD-10-CM

## 2020-09-01 DIAGNOSIS — Z20.822 ENCOUNTER FOR LABORATORY TESTING FOR COVID-19 VIRUS: Primary | ICD-10-CM

## 2020-09-01 DIAGNOSIS — B34.9 VIRAL ILLNESS: ICD-10-CM

## 2020-09-01 DIAGNOSIS — J02.0 STREP PHARYNGITIS: ICD-10-CM

## 2020-09-01 LAB — S PYO AG THROAT QL: POSITIVE

## 2020-09-01 PROCEDURE — 99214 OFFICE O/P EST MOD 30 MIN: CPT | Performed by: EMERGENCY MEDICINE

## 2020-09-01 PROCEDURE — 87430 STREP A AG IA: CPT | Performed by: EMERGENCY MEDICINE

## 2020-09-01 RX ORDER — AMOXICILLIN 500 MG/1
500 TABLET, FILM COATED ORAL 3 TIMES DAILY
Qty: 30 TABLET | Refills: 0 | Status: SHIPPED | OUTPATIENT
Start: 2020-09-01 | End: 2020-09-11

## 2020-09-01 NOTE — ED PROVIDER NOTES
Patient Seen in: [facility] Immediate Care In [address] [place] [address]      History   Patient presents with:  Fever  Cough/URI    Stated Complaint: body aches/low fever    HPI    Patient is a 53-year-old female with history of HIV and asthma [facility]: Positive for appetite change, fatigue and fever. Negative for activity change and chills. HENT: Positive for sore throat. Negative for congestion, rhinorrhea and trouble swallowing. Eyes: Negative.     Respiratory: Negative for shortn Abdominal:      General: Bowel sounds are normal.      Palpations: Abdomen is soft. Tenderness: There is no tenderness. Lymphadenopathy:      Cervical: No cervical adenopathy. Skin:     General: Skin is warm.       Capillary Refill: Capillary ref

## 2020-09-01 NOTE — PROGRESS NOTES
PT referred for in person evaluation due to chronic illness - immunocompromising. Referred to Immediate care.  No charge for e-viist.

## 2020-09-03 ENCOUNTER — TELEPHONE (OUTPATIENT)
Dept: INTERNAL MEDICINE CLINIC | Facility: CLINIC | Age: 70
End: 2020-09-03

## 2020-09-03 ENCOUNTER — PATIENT MESSAGE (OUTPATIENT)
Dept: INTERNAL MEDICINE CLINIC | Facility: CLINIC | Age: 70
End: 2020-09-03

## 2020-09-03 LAB — SARS-COV-2 RNA RESP QL NAA+PROBE: NOT DETECTED

## 2020-09-03 NOTE — TELEPHONE ENCOUNTER
Duplicate visit    From: Bairon Dyson  To: Jose Lanier MD  Sent: 9/3/2020  7:41 AM CDT  Subject: Visit Follow-up Question    Have strep throat. Need to follow up with a primary care doctor. Would like a visit soon.

## 2020-09-03 NOTE — TELEPHONE ENCOUNTER
----- Message from Jr Lu sent at 9/3/2020 10:02 AM CDT -----  Regarding: Visit Follow-up Question  Contact: [phone]  Need to do a follow up with you. Went to immediate care on Tuesday and have strep throat. Possibly today or tomorrow.  Isabel Kidd

## 2020-09-03 NOTE — TELEPHONE ENCOUNTER
Verified pt name and . Pt states she already has scheduled a virtual visit for 2020. Pt states she is taking antibiotics as prescribed for strep throat.  Pt states she feels better at times, but at times her symptoms return, she gets body aches and i

## 2020-09-04 ENCOUNTER — TELEMEDICINE (OUTPATIENT)
Dept: INTERNAL MEDICINE CLINIC | Facility: CLINIC | Age: 70
End: 2020-09-04
Payer: MEDICARE

## 2020-09-04 DIAGNOSIS — J02.0 STREP THROAT: Primary | ICD-10-CM

## 2020-09-04 PROCEDURE — 99213 OFFICE O/P EST LOW 20 MIN: CPT | Performed by: INTERNAL MEDICINE

## 2020-09-04 NOTE — PROGRESS NOTES
Patient ID: Isak Acevedo is a 78year old female. Patient presents with:  Sore Throat     doximity     HISTORY OF PRESENT ILLNESS:   Patient presents for above. This visit is conducted using Telemedicine with live, interactive video and audio.   Godwin Upton (PREZCOBIX OR), Take by mouth., Disp: , Rfl:   •  Etravirine (INTELENCE OR), Take by mouth., Disp: , Rfl:   •  amoxicillin 500 MG Oral Tab, Take 1 tablet (500 mg total) by mouth 3 (three) times daily for 10 days. , Disp: 30 tablet, Rfl: 0  •  LEVOTHYROXINE Alcohol use: Yes        Frequency: Monthly or less        Drinks per session: 1 or 2        Comment: occasional wine      Drug use: No      Sexual activity: Not on file    Lifestyle      Physical activity:        Days per week: Not on file        Minutes p completed using two-way, real-time interactive audio and video communication.  This has been done in good rashi to provide continuity of care in the best interest of the provider-patient relationship, due to the [address]

## 2020-09-13 ENCOUNTER — APPOINTMENT (OUTPATIENT)
Dept: CT IMAGING | Facility: HOSPITAL | Age: 70
DRG: 083 | End: 2020-09-13
Attending: EMERGENCY MEDICINE
Payer: MEDICARE

## 2020-09-13 ENCOUNTER — HOSPITAL ENCOUNTER (INPATIENT)
Facility: HOSPITAL | Age: 70
LOS: 3 days | Discharge: HOME | DRG: 083 | End: 2020-09-16
Attending: EMERGENCY MEDICINE | Admitting: HOSPITALIST
Payer: MEDICARE

## 2020-09-13 ENCOUNTER — APPOINTMENT (OUTPATIENT)
Dept: CT IMAGING | Facility: HOSPITAL | Age: 70
DRG: 083 | End: 2020-09-13
Payer: MEDICARE

## 2020-09-13 DIAGNOSIS — R55 SYNCOPE AND COLLAPSE: Primary | ICD-10-CM

## 2020-09-13 DIAGNOSIS — S06.361A TRAUMATIC HEMORRHAGE OF CEREBRUM WITH LOSS OF CONSCIOUSNESS OF 30 MINUTES OR LESS, UNSPECIFIED LATERALITY, INITIAL ENCOUNTER (HCC): ICD-10-CM

## 2020-09-13 LAB
ANION GAP SERPL CALC-SCNC: 4 MMOL/L (ref 0–18)
BASOPHILS # BLD AUTO: 0.06 X10(3) UL (ref 0–0.2)
BASOPHILS NFR BLD AUTO: 1.1 %
BUN BLD-MCNC: 13 MG/DL (ref 7–18)
BUN/CREAT SERPL: 20.6 (ref 10–20)
CALCIUM BLD-MCNC: 8.9 MG/DL (ref 8.5–10.1)
CHLORIDE SERPL-SCNC: 110 MMOL/L (ref 98–112)
CO2 SERPL-SCNC: 30 MMOL/L (ref 21–32)
CREAT BLD-MCNC: 0.63 MG/DL (ref 0.55–1.02)
DEPRECATED RDW RBC AUTO: 38.6 FL (ref 35.1–46.3)
EOSINOPHIL # BLD AUTO: 0.08 X10(3) UL (ref 0–0.7)
EOSINOPHIL NFR BLD AUTO: 1.4 %
ERYTHROCYTE [DISTWIDTH] IN BLOOD BY AUTOMATED COUNT: 12 % (ref 11–15)
GLUCOSE BLD-MCNC: 115 MG/DL (ref 70–99)
HCT VFR BLD AUTO: 39.8 % (ref 35–48)
HGB BLD-MCNC: 13.2 G/DL (ref 12–16)
IMM GRANULOCYTES # BLD AUTO: 0.04 X10(3) UL (ref 0–1)
IMM GRANULOCYTES NFR BLD: 0.7 %
INR BLD: 0.96 (ref 0.9–1.2)
LYMPHOCYTES # BLD AUTO: 1.97 X10(3) UL (ref 1–4)
LYMPHOCYTES NFR BLD AUTO: 35.2 %
MCH RBC QN AUTO: 29.2 PG (ref 26–34)
MCHC RBC AUTO-ENTMCNC: 33.2 G/DL (ref 31–37)
MCV RBC AUTO: 88.1 FL (ref 80–100)
MONOCYTES # BLD AUTO: 0.46 X10(3) UL (ref 0.1–1)
MONOCYTES NFR BLD AUTO: 8.2 %
MRSA DNA SPEC QL NAA+PROBE: NEGATIVE
NEUTROPHILS # BLD AUTO: 2.99 X10 (3) UL (ref 1.5–7.7)
NEUTROPHILS # BLD AUTO: 2.99 X10(3) UL (ref 1.5–7.7)
NEUTROPHILS NFR BLD AUTO: 53.4 %
OSMOLALITY SERPL CALC.SUM OF ELEC: 299 MOSM/KG (ref 275–295)
PLATELET # BLD AUTO: 229 10(3)UL (ref 150–450)
POTASSIUM SERPL-SCNC: 3.3 MMOL/L (ref 3.5–5.1)
PROTHROMBIN TIME: 12.6 SECONDS (ref 11.8–14.5)
RBC # BLD AUTO: 4.52 X10(6)UL (ref 3.8–5.3)
SODIUM SERPL-SCNC: 144 MMOL/L (ref 136–145)
TROPONIN I SERPL-MCNC: <0.045 NG/ML (ref ?–0.04)
WBC # BLD AUTO: 5.6 X10(3) UL (ref 4–11)

## 2020-09-13 PROCEDURE — 70496 CT ANGIOGRAPHY HEAD: CPT | Performed by: EMERGENCY MEDICINE

## 2020-09-13 PROCEDURE — 99223 1ST HOSP IP/OBS HIGH 75: CPT | Performed by: HOSPITALIST

## 2020-09-13 PROCEDURE — 70450 CT HEAD/BRAIN W/O DYE: CPT

## 2020-09-13 PROCEDURE — 72125 CT NECK SPINE W/O DYE: CPT

## 2020-09-13 RX ORDER — HYDRALAZINE HYDROCHLORIDE 20 MG/ML
10 INJECTION INTRAMUSCULAR; INTRAVENOUS EVERY 4 HOURS PRN
Status: DISCONTINUED | OUTPATIENT
Start: 2020-09-13 | End: 2020-09-16

## 2020-09-13 RX ORDER — MORPHINE SULFATE 4 MG/ML
4 INJECTION, SOLUTION INTRAMUSCULAR; INTRAVENOUS ONCE
Status: COMPLETED | OUTPATIENT
Start: 2020-09-13 | End: 2020-09-13

## 2020-09-13 RX ORDER — LEVOTHYROXINE SODIUM 0.07 MG/1
75 TABLET ORAL
Status: DISCONTINUED | OUTPATIENT
Start: 2020-09-14 | End: 2020-09-16

## 2020-09-13 RX ORDER — MORPHINE SULFATE 4 MG/ML
4 INJECTION, SOLUTION INTRAMUSCULAR; INTRAVENOUS EVERY 2 HOUR PRN
Status: DISCONTINUED | OUTPATIENT
Start: 2020-09-13 | End: 2020-09-16

## 2020-09-13 RX ORDER — ALBUTEROL SULFATE 90 UG/1
2 AEROSOL, METERED RESPIRATORY (INHALATION) EVERY 6 HOURS PRN
Status: DISCONTINUED | OUTPATIENT
Start: 2020-09-13 | End: 2020-09-16

## 2020-09-13 RX ORDER — SODIUM CHLORIDE, SODIUM LACTATE, POTASSIUM CHLORIDE, CALCIUM CHLORIDE 600; 310; 30; 20 MG/100ML; MG/100ML; MG/100ML; MG/100ML
INJECTION, SOLUTION INTRAVENOUS CONTINUOUS
Status: DISCONTINUED | OUTPATIENT
Start: 2020-09-14 | End: 2020-09-14

## 2020-09-13 RX ORDER — MORPHINE SULFATE 2 MG/ML
2 INJECTION, SOLUTION INTRAMUSCULAR; INTRAVENOUS EVERY 2 HOUR PRN
Status: DISCONTINUED | OUTPATIENT
Start: 2020-09-13 | End: 2020-09-16

## 2020-09-13 RX ORDER — PANTOPRAZOLE SODIUM 40 MG/1
40 TABLET, DELAYED RELEASE ORAL
Status: DISCONTINUED | OUTPATIENT
Start: 2020-09-14 | End: 2020-09-16

## 2020-09-13 RX ORDER — ETRAVIRINE 100 MG/1
200 TABLET ORAL 2 TIMES DAILY
Status: DISCONTINUED | OUTPATIENT
Start: 2020-09-14 | End: 2020-09-16

## 2020-09-13 RX ORDER — MORPHINE SULFATE 4 MG/ML
2 INJECTION, SOLUTION INTRAMUSCULAR; INTRAVENOUS ONCE
Status: COMPLETED | OUTPATIENT
Start: 2020-09-13 | End: 2020-09-13

## 2020-09-13 RX ORDER — ACETAMINOPHEN 325 MG/1
650 TABLET ORAL EVERY 6 HOURS PRN
Status: DISCONTINUED | OUTPATIENT
Start: 2020-09-13 | End: 2020-09-16

## 2020-09-13 RX ORDER — ONDANSETRON 2 MG/ML
4 INJECTION INTRAMUSCULAR; INTRAVENOUS EVERY 6 HOURS PRN
Status: DISCONTINUED | OUTPATIENT
Start: 2020-09-13 | End: 2020-09-16

## 2020-09-13 NOTE — ED INITIAL ASSESSMENT (HPI)
Patient presents to ER s/p unwitnessed fall at home. Lac to left lateral scalp. Bleeding controlled at this time. Patient unsure of what caused the fall. Unsure of LOC.  Patient is in c-collar upon arrival.

## 2020-09-13 NOTE — ED PROVIDER NOTES
Patient Seen in: [facility]      History   Patient presents with:  Fall    Stated Complaint: fall    HPI    Pt is 77 yo F who arrives with daughter for fall that occurred immediately pta.  Pt was facetiming with her grandchildr other systems reviewed and negative except as noted above.     Physical Exam     ED Triage Vitals   BP 09/13/20 1736 138/73   Pulse 09/13/20 1736 89   Resp 09/13/20 1736 24   Temp 09/13/20 1737 97.4 °F (36.3 °C)   Temp src 09/13/20 1737 Oral   SpO2 09/13/20 intervals and axes as noted on EKG Report.   Rate: 86  Rhythm: Sinus Rhythm  Reading: no TOREY          MDM     Pulse Ox: 95%, Normal, RA    Cardiac Monitor: Pulse Readings from Last 1 Encounters:  09/13/20 : 92  , sinus     Radiology findings: Ct Brain Or He occlusion.      Dictated by (CST): Js Cerda MD on 9/13/2020 at 8:13 PM     Finalized by (CST): Js Cerda MD on 9/13/2020 at 8:24 PM            Radiology exams  Viewed and reviewed by myself and findings discussed with patient including need for f

## 2020-09-14 ENCOUNTER — APPOINTMENT (OUTPATIENT)
Dept: CV DIAGNOSTICS | Facility: HOSPITAL | Age: 70
DRG: 083 | End: 2020-09-14
Attending: HOSPITALIST
Payer: MEDICARE

## 2020-09-14 ENCOUNTER — APPOINTMENT (OUTPATIENT)
Dept: ULTRASOUND IMAGING | Facility: HOSPITAL | Age: 70
DRG: 083 | End: 2020-09-14
Attending: HOSPITALIST
Payer: MEDICARE

## 2020-09-14 ENCOUNTER — APPOINTMENT (OUTPATIENT)
Dept: CT IMAGING | Facility: HOSPITAL | Age: 70
DRG: 083 | End: 2020-09-14
Attending: HOSPITALIST
Payer: MEDICARE

## 2020-09-14 LAB
ANION GAP SERPL CALC-SCNC: 3 MMOL/L (ref 0–18)
BASOPHILS # BLD AUTO: 0.05 X10(3) UL (ref 0–0.2)
BASOPHILS NFR BLD AUTO: 0.6 %
BUN BLD-MCNC: 9 MG/DL (ref 7–18)
BUN/CREAT SERPL: 17.6 (ref 10–20)
CALCIUM BLD-MCNC: 8.6 MG/DL (ref 8.5–10.1)
CHLORIDE SERPL-SCNC: 111 MMOL/L (ref 98–112)
CO2 SERPL-SCNC: 30 MMOL/L (ref 21–32)
CREAT BLD-MCNC: 0.51 MG/DL (ref 0.55–1.02)
DEPRECATED RDW RBC AUTO: 39.7 FL (ref 35.1–46.3)
EOSINOPHIL # BLD AUTO: 0.03 X10(3) UL (ref 0–0.7)
EOSINOPHIL NFR BLD AUTO: 0.4 %
ERYTHROCYTE [DISTWIDTH] IN BLOOD BY AUTOMATED COUNT: 12.1 % (ref 11–15)
GLUCOSE BLD-MCNC: 110 MG/DL (ref 70–99)
HCT VFR BLD AUTO: 38.6 % (ref 35–48)
HGB BLD-MCNC: 12.9 G/DL (ref 12–16)
IMM GRANULOCYTES # BLD AUTO: 0.03 X10(3) UL (ref 0–1)
IMM GRANULOCYTES NFR BLD: 0.4 %
LYMPHOCYTES # BLD AUTO: 0.9 X10(3) UL (ref 1–4)
LYMPHOCYTES NFR BLD AUTO: 11.6 %
MCH RBC QN AUTO: 29.9 PG (ref 26–34)
MCHC RBC AUTO-ENTMCNC: 33.4 G/DL (ref 31–37)
MCV RBC AUTO: 89.4 FL (ref 80–100)
MONOCYTES # BLD AUTO: 0.59 X10(3) UL (ref 0.1–1)
MONOCYTES NFR BLD AUTO: 7.6 %
NEUTROPHILS # BLD AUTO: 6.15 X10 (3) UL (ref 1.5–7.7)
NEUTROPHILS # BLD AUTO: 6.15 X10(3) UL (ref 1.5–7.7)
NEUTROPHILS NFR BLD AUTO: 79.4 %
OSMOLALITY SERPL CALC.SUM OF ELEC: 297 MOSM/KG (ref 275–295)
PLATELET # BLD AUTO: 232 10(3)UL (ref 150–450)
POTASSIUM SERPL-SCNC: 4 MMOL/L (ref 3.5–5.1)
RBC # BLD AUTO: 4.32 X10(6)UL (ref 3.8–5.3)
SARS-COV-2 RNA RESP QL NAA+PROBE: NOT DETECTED
SODIUM SERPL-SCNC: 144 MMOL/L (ref 136–145)
WBC # BLD AUTO: 7.8 X10(3) UL (ref 4–11)

## 2020-09-14 PROCEDURE — 99223 1ST HOSP IP/OBS HIGH 75: CPT | Performed by: OTHER

## 2020-09-14 PROCEDURE — 70450 CT HEAD/BRAIN W/O DYE: CPT | Performed by: HOSPITALIST

## 2020-09-14 PROCEDURE — 93306 TTE W/DOPPLER COMPLETE: CPT | Performed by: HOSPITALIST

## 2020-09-14 PROCEDURE — 93880 EXTRACRANIAL BILAT STUDY: CPT | Performed by: HOSPITALIST

## 2020-09-14 PROCEDURE — 99233 SBSQ HOSP IP/OBS HIGH 50: CPT | Performed by: HOSPITALIST

## 2020-09-14 RX ORDER — MECLIZINE HYDROCHLORIDE 25 MG/1
25 TABLET ORAL 3 TIMES DAILY PRN
Status: DISCONTINUED | OUTPATIENT
Start: 2020-09-14 | End: 2020-09-16

## 2020-09-14 NOTE — CONSULTS
[facility]    Report of Consultation    Elizabet Taylor Patient Status:  Inpatient    3/20/1950 MRN [id number]   Location [facility] 2W/SW Attending Sallee Dance, [address] Day [address] PCP Harley Brooke MD     Date of Admissio Tinnitus    • Visual impairment     right eye, glasses       Past Surgical History  Past Surgical History:   Procedure Laterality Date   •      • CATARACT      surgery    • COLONOSCOPY N/A 10/20/2017    Performed by Sofía Terrazas, mouth. Darunavir-Cobicistat (PREZCOBIX OR), Take by mouth. Etravirine (INTELENCE OR), Take by mouth.   LEVOTHYROXINE SODIUM 75 MCG Oral Tab, TAKE 1 TABLET(75 MCG) BY MOUTH EVERY MORNING BEFORE BREAKFAST  LEVOCETIRIZINE DIHYDROCHLORIDE 5 MG Oral Tab, TAKE Fundoscopically- No papilledema or retinal hemorrhages. Normal optic discs, sharp edges. III, IV, VI- EOM intact, KAT  V. Facial sensation intact  VII. Face symmetric, no facial weakness  VIII. Hearing intact. IX. Pallet elevates symmetrically. XI.  Clinton Robin 9/14/2020 at 8:04 AM     Finalized by (CST): Carol Navarrete MD on 9/14/2020 at 8:16 AM          Ct Brain Or Head (98033)    Result Date: 9/13/2020  CONCLUSION:  1.  Moderate-sized focal intraparenchymal hemorrhage in the right frontal lobe superiorly, small Ekg 12-lead    Result Date: 9/13/2020  ECG Report  Interpretation  -------------------------- Sinus Rhythm -Anterior infarct -age undetermined. - Negative precordial T-waves -May be normal -consider anteroseptal ischemia.  ABNORMAL When compared with

## 2020-09-14 NOTE — H&P
[address] Patient Status:  Emergency    3/20/1950 MRN [id number]   Location [address] Attending Shaggy Serra MD   Hosp Day # 0 PCP Harley Brooke MD     Date Performed by Arnold Deluca MD at [address] [facility] MAIN OR   • EYE SURGERY Right 2003     Family History   Problem Relation Age of Onset   • Hypertension Father    • Diabetes Sister    • Other (cervical cancer) Maternal Cousin Male         pre josefa      reports that Negative  Endocrine:  Negative. Immunologic:  Negative. Musculoskeletal:  Negative. Integumentary:  Negative. Neurologic: Headache, dizzy  Psychiatric:  Negative.   ROS reviewed as documented in chart    Physical Exam:  Temp:  [97.4 °F (36.3 °C)] 97.4 ° frontal lobe superiorly. Difficult to exclude a small amount of subarachnoid blood. No significant surrounding mass effect or edema. Short-term follow-up studies advised.   Moderate subcutaneous hematoma at the high left frontal parietal skull with subcu vitals. HIV positive  Continue anti-retrovirals    Hypothyroidism  Continue Synthroid. Asthma  Resume home inhalers.     Prophylaxis  SCDs, heparin on hold considering intraparenchymal brain bleed    CODE STATUS  Full    Primary care physician  [facility]

## 2020-09-14 NOTE — CONSULTS
[facility]    Neurosurgery Consultation    Juan Daniel Hernandez Patient Status:  Inpatient    3/20/1950 MRN [id number]   Location [facility] 2W/SW Attending Laith Strickland, [address] [facility] Day # 1 PCP Faizan Kincaid MD     Date of Adm EYE SCLERAL BUCKLING Right 8/2/2018    Performed by David Lizarraga MD at [facility] OR   • EYE SURGERY Right 2003     Family History   Problem Relation Age of Onset   • Hypertension Father    • Diabetes Sister    • Other (cervical cancer) Maternal Cousin eye prosthesis  EOMI  MAEx4, no drift  Sensation symmetrical    Abdomen:  Soft, non-distended, non-tender, with no rebound or guarding. No peritoneal signs. Extremities:  Non-tender, no lower extremity edema noted.       Labs:  Lab Results   Component superiorly. There is a more diminutive right vertebral artery with moderate tapering of the distal right vertebral artery as it joins the basilar artery. Dominant left vertebral artery. Normal basilar artery.   No evidence of large aneurysm, significant s

## 2020-09-14 NOTE — PLAN OF CARE
Pt received in via cart from ED. Pt transferred to bed independently. Denies chest pain or sob on 2l 02 via nasal cannula. Laceration and swelling to left posterior head. Wound measured. Pt remains NPO with sips of water.  LR infusing to left 18 at 100 ml/h Monitor temperature, glucose, and sodium.  Initiate appropriate interventions as ordered  Outcome: Progressing  Goal: Absence of seizures  Description  INTERVENTIONS  - Monitor for seizure activity  - Administer anti-seizure medications as ordered  - Monito

## 2020-09-14 NOTE — PROGRESS NOTES
[facility]    Progress Note    Jason Villarreal Patient Status:  Inpatient    3/20/1950 MRN [id number]   Location [facility] 2W/SW Attending Aneta Agustin, [address] [facility] Day # 1 PCP Nasir Schmidt MD       Subjective:   Imani Melendrez Continuous  Pantoprazole Sodium (PROTONIX) EC tab 40 mg, 40 mg, Oral, QAM AC        Lab Results   Component Value Date    WBC 7.8 09/14/2020    HGB 12.9 09/14/2020    HCT 38.6 09/14/2020    .0 09/14/2020    CREATSERUM 0.51 (L) 09/14/2020    BUN 9 09 at approximately 1810 hours. Dictated by (CST): Carlos Leon MD on 9/13/2020 at 6:05 PM     Finalized by (CST): Carlos Leon MD on 9/13/2020 at 6:13 PM          Ct Spine Cervical (cpt=72125)    Result Date: 9/13/2020  CONCLUSION:  1.  No traumatic fr    Syncope  Unclear etiology, will get 2D echo and carotid Dopplers to further evaluate.   Check orthostatic vitals.     HIV positive  Continue anti-retrovirals     Hypothyroidism  Continue Synthroid.     Asthma  Resume home inhalers.     Prophylaxis  SCD

## 2020-09-14 NOTE — PROGRESS NOTES
Pt reports nausea anddizziness. 4 mg zofran given. Dr Lucina Veras notified of dizziness and antivert ordered. Adult electrolyte protocol maintaind.

## 2020-09-14 NOTE — PLAN OF CARE
Problem: NEUROLOGICAL - ADULT  Goal: Achieves stable or improved neurological status  Description  INTERVENTIONS  - Assess for and report changes in neurological status  - Initiate measures to prevent increased intracranial pressure  - Maintain blood pre patient safety including physical limitations  - Instruct pt to call for assistance with activity based on assessment  - Modify environment to reduce risk of injury  - Provide assistive devices as appropriate  - Consider OT/PT consult to assist with streng development  - Assess and document skin integrity  - Monitor for areas of redness and/or skin breakdown  - Initiate interventions, skin care algorithm/standards of care as needed  Outcome: Progressing     Problem: Impaired Cognition  Goal: Patient will exh

## 2020-09-15 ENCOUNTER — ANCILLARY PROCEDURE (OUTPATIENT)
Dept: CARDIOLOGY | Age: 70
End: 2020-09-15
Attending: INTERNAL MEDICINE

## 2020-09-15 ENCOUNTER — APPOINTMENT (OUTPATIENT)
Dept: MRI IMAGING | Facility: HOSPITAL | Age: 70
DRG: 083 | End: 2020-09-15
Attending: Other
Payer: MEDICARE

## 2020-09-15 DIAGNOSIS — R55 SYNCOPE AND COLLAPSE: Primary | ICD-10-CM

## 2020-09-15 DIAGNOSIS — R55 SYNCOPE AND COLLAPSE: ICD-10-CM

## 2020-09-15 PROCEDURE — 70553 MRI BRAIN STEM W/O & W/DYE: CPT | Performed by: OTHER

## 2020-09-15 PROCEDURE — 99231 SBSQ HOSP IP/OBS SF/LOW 25: CPT | Performed by: OTHER

## 2020-09-15 PROCEDURE — 95816 EEG AWAKE AND DROWSY: CPT | Performed by: OTHER

## 2020-09-15 PROCEDURE — 99233 SBSQ HOSP IP/OBS HIGH 50: CPT | Performed by: HOSPITALIST

## 2020-09-15 NOTE — CONSULTS
[facility]    Cardiology Consultation  [facility]    Michela Fam Patient Status:  Inpatient    3/20/1950 MRN [id number]   Location [facility] 2W/SW Attending William Bowen, [address] [facility] Day # 2 [facility] [place] History:   Procedure Laterality Date   •      • CATARACT      surgery    • COLONOSCOPY N/A 10/20/2017    Performed by Adi Horowitz MD at [address] [place] [address] ENDOSCOPY   • EGD  2018   • ESOPHAGOGASTRODUODENOSCOPY (EGD) N/A 3/23/2018    Perfor MCG Oral Tab, TAKE 1 TABLET(75 MCG) BY MOUTH EVERY MORNING BEFORE BREAKFAST  LEVOCETIRIZINE DIHYDROCHLORIDE 5 MG Oral Tab, TAKE 1 TABLET BY MOUTH EVERY NIGHT AT BEDTIME  clotrimazole-betamethasone 1-0.05 % External Cream, Apply a small amount to effected a 09/14/20 0659 09/14/20 0700 - 09/15/20 0659 09/15/20 0700 - 09/16/20 0659       Intake    P.O.  --  500  100    P. O. -- 500 100    I.V.  --  0  --    I.V. -- 0 --    Total Intake -- 500 100       Output    Urine  600  1400  --    Urine -- 1400 --    Output 01/13/2020    ROSITA 65 08/30/2017    LIP 22 08/30/2017    DDIMER <0.27 01/16/2020    TROP <0.045 09/13/2020    RPR Nonreactive 09/01/2016         Recent Labs   Lab 09/13/20  1737 09/14/20  0457   * 110*   BUN 13 9   CREATSERUM 0.63 0.51*   GFRAA 107 1 30-day monitor upon discharge and will try and arrange that. No further cardiac recommendations otherwise    Thank you for allowing me to participate in the care of your patient.     Aníbal Wya  9/15/2020

## 2020-09-15 NOTE — PLAN OF CARE
Problem: Patient Centered Care  Goal: Patient preferences are identified and integrated in the patient's plan of care  Description  Interventions:  - What would you like us to know as we care for you?  - Provide timely, complete, and accurate information activity  Description  INTERVENTIONS:  - Maintain airway, patient safety  and administer oxygen as ordered  - Monitor patient for seizure activity, document and report duration and description of seizure to MD/LIP  - If seizure occurs, turn patient to side bleeding and/or hematoma  - Assess quality of pulses, skin color and temperature  - Assess for signs of decreased coronary artery perfusion - ex.  Angina  - Evaluate fluid balance, assess for edema, trend weights  Outcome: Progressing  Goal: Absence of card

## 2020-09-15 NOTE — PROGRESS NOTES
[facility]    Progress Note    Ramostarik Saldaña Patient Status:  Inpatient    3/20/1950 MRN [id number]   Location [facility] 2W/SW Attending Addison Braswell, [address] [facility] Day # 2 PCP Justine Suarez MD       Subjective:   Liang Zelaya 09/14/2020    HGB 12.9 09/14/2020    HCT 38.6 09/14/2020    .0 09/14/2020    CREATSERUM 0.51 (L) 09/14/2020    BUN 9 09/14/2020     09/14/2020    K 4.0 09/14/2020     09/14/2020    CO2 30.0 09/14/2020     (H) 09/14/2020    CA 8.6 MD DAVID on 9/13/2020 at 6:13 PM          Ct Spine Cervical (cpt=72125)    Result Date: 9/13/2020  CONCLUSION:  1. No traumatic fracture, subluxation or prevertebral soft tissue swelling. Moderate spondylosis at C4-C5 as discussed.     Dictated by (CST): Uzma Rucker 9/13/2020  ECG Report  Interpretation  -------------------------- Sinus Rhythm -Anterior infarct -age undetermined. - Negative precordial T-waves -May be normal -consider anteroseptal ischemia.  ABNORMAL When compared with ECG of 01/13/2020 12:23:41 Gilles Wise Certification    Patient will require inpatient services that will reasonably be expected to span two midnight's based on the clinical documentation in H+P.    Based on patients current state of illness, I anticipate that, after discharge, patient will requ

## 2020-09-15 NOTE — PROGRESS NOTES
[facility] AND [facility]  Neurology Progress Note    Soraya Edgar Patient Status:  Inpatient    3/20/1950 MRN [id number]   Location [facility] 2W/SW Attending Jessie Wetzel, [address] [facility] Day # 2 PCP Naya Bowen MD     Subjective:  Saturnino Ly GJ redness or swelling  Neck- No masses or adenopathy    Neurological:     Mental Status- Alert and oriented x3.   Normal attention span and concentration  Thought process intact  Memory intact- recent and remote  Mood intact  Fund of knowledge appropriate for smaller area of hemorrhage in the contralateral left frontal lobe superiorly. Difficult to exclude a small amount of subarachnoid blood. No significant surrounding mass effect or edema. Short-term follow-up studies advised.   Moderate subcutaneous hemato Lelia Santiago MD on 9/13/2020 at 8:13 PM     Finalized by (CST): Lelia Santiago MD on 9/13/2020 at 8:24 PM          Ekg 12-lead    Result Date: 9/13/2020  ECG Report  Interpretation  -------------------------- Sinus Rhythm -Anterior infarct -age [facility]

## 2020-09-15 NOTE — PROCEDURES
EEG report    REFERRING PHYSICIAN: Steff Miramontes DO    PCP and phone number:  Emir Philippe MD  [phone]    TECHNIQUE: 21 channels of EEG, 2 channels of EOG, and 1 channel of EKG were recorded utilizing the International 10/20 System.  The recor

## 2020-09-16 VITALS
SYSTOLIC BLOOD PRESSURE: 127 MMHG | RESPIRATION RATE: 22 BRPM | HEART RATE: 80 BPM | BODY MASS INDEX: 27.29 KG/M2 | HEIGHT: 58 IN | DIASTOLIC BLOOD PRESSURE: 64 MMHG | OXYGEN SATURATION: 96 % | WEIGHT: 130 LBS | TEMPERATURE: 98 F

## 2020-09-16 PROCEDURE — 99232 SBSQ HOSP IP/OBS MODERATE 35: CPT | Performed by: OTHER

## 2020-09-16 PROCEDURE — 95816 EEG AWAKE AND DROWSY: CPT | Performed by: OTHER

## 2020-09-16 PROCEDURE — 99239 HOSP IP/OBS DSCHRG MGMT >30: CPT | Performed by: HOSPITALIST

## 2020-09-16 RX ORDER — ACETAMINOPHEN 325 MG/1
650 TABLET ORAL EVERY 6 HOURS PRN
Qty: 1 TABLET | Refills: 0 | Status: SHIPPED | COMMUNITY
Start: 2020-09-16 | End: 2021-09-11

## 2020-09-16 RX ORDER — MECLIZINE HYDROCHLORIDE 25 MG/1
25 TABLET ORAL 3 TIMES DAILY PRN
Qty: 21 TABLET | Refills: 0 | Status: SHIPPED | OUTPATIENT
Start: 2020-09-16

## 2020-09-16 NOTE — PLAN OF CARE
Problem: Patient Centered Care  Goal: Patient preferences are identified and integrated in the patient's plan of care  Description  Interventions:  - What would you like us to know as we care for you?  Pt has HIV    - Provide timely, complete, and accurat activity  Description  INTERVENTIONS:  - Maintain airway, patient safety  and administer oxygen as ordered  - Monitor patient for seizure activity, document and report duration and description of seizure to MD/LIP  - If seizure occurs, turn patient to side bleeding and/or hematoma  - Assess quality of pulses, skin color and temperature  - Assess for signs of decreased coronary artery perfusion - ex.  Angina  - Evaluate fluid balance, assess for edema, trend weights  Outcome: Progressing  Goal: Absence of card side during conversation  - For patients with neglect, place hot drinks on the unaffected side  - Encourage use of hearing aids  - Encourage use of eye glasses  Outcome: Progressing   Pt alert and oriented x 3. No neural deficit. No complaint of dizziness.

## 2020-09-16 NOTE — OCCUPATIONAL THERAPY NOTE
OCCUPATIONAL THERAPY EVALUATION - INPATIENT     Room Number: 229/229-A  Evaluation Date: 9/16/2020  Type of Evaluation: Initial  Presenting Problem: (Syncope, fall, traumatic hemorrhage of cerebrum)    Physician Order: IP Consult to Occupational Therapy Surgical History  Past Surgical History:   Procedure Laterality Date   •      • CATARACT      surgery    • COLONOSCOPY N/A 10/20/2017    Performed by Tiesha Mederos MD at [address] [place] [address] ENDOSCOPY   • EGD  2018   • Luis Carlos Mesa Putting on and taking off regular upper body clothing?: None  -   Taking care of personal grooming such as brushing teeth?: None  -   Eating meals?: None    AM-PAC Score:  Score: 24  Approx Degree of Impairment: 0%  Standardized Score (AM-PAC Scale): 57.54

## 2020-09-16 NOTE — PROCEDURES
EEG report    REFERRING PHYSICIAN: Robyn Zimmerman,*    PCP and phone number:  Leni Vázquez MD  [phone]    TECHNIQUE: 21 channels of EEG, 2 channels of EOG, and 1 channel of EKG were recorded utilizing the International 10/20 System.  The

## 2020-09-16 NOTE — DISCHARGE SUMMARY
Dc summary#[id number]  > 30 min spent on [address] Discharge Diagnoses: sah    Lace+ Score: 64  59-90 High Risk  29-58 Medium Risk  0-28   Low Risk. TCM Follow-Up Recommendation:  LACE > 58:  High Risk of readmission after discharge from the hospital.

## 2020-09-16 NOTE — PLAN OF CARE
Yanna is A&O x4. She has had no c/o of lightheaded or dizziness throughout the shift. She is a standby by assist. Discharge orders in place.  A Holter monitor has been ordered for her and per the [address] [place] [address] [employer], this pt will receive her monit and fluid volume within ordered parameters to optimize cerebral perfusion and minimize risk of hemorrhage  - Monitor temperature, glucose, and sodium.  Initiate appropriate interventions as ordered  Outcome: Completed  Goal: Absence of seizures  Description strengthening/mobility  - Encourage toileting schedule  Outcome: Completed     Problem: CARDIOVASCULAR - ADULT  Goal: Maintains optimal cardiac output and hemodynamic stability  Description  INTERVENTIONS:  - Monitor vital signs, rhythm, and trends  - Krista improved attention, thought processing and/or memory  Description  Interventions:    Outcome: Completed

## 2020-09-16 NOTE — PROGRESS NOTES
[facility] AND [facility]  Neurology Progress Note    Lior Chen Patient Status:  Inpatient    3/20/1950 MRN [id number]   Location [facility] 2W/SW Attending Paty Dewey, [address] [facility] Day # 3 PCP Alex Zelaya MD     Subjective:  Lea Ashli UY atraumatic  Eyes- No redness or swelling  Neck- No masses or adenopathy    Neurological:     Mental Status- Alert and oriented x3.   Normal attention span and concentration  Thought process intact  Memory intact- recent and remote  Mood intact  Fund of know Doppler Bilat - Diag Img (cpt=93880)    Result Date: 9/14/2020  CONCLUSION:  1. No hemodynamically significant stenosis of the right or left internal carotid artery. 2. Antegrade flow in the vertebral arteries.    Dictated by (CST): Ramos Wu MD on 9/

## 2020-09-16 NOTE — PHYSICAL THERAPY NOTE
PHYSICAL THERAPY EVALUATION - INPATIENT     Room Number: 229/229-A  Evaluation Date: 9/16/2020  Type of Evaluation: Initial   Physician Order: PT Eval and Treat    Presenting Problem: syncope and collapse  Reason for Therapy: Mobility Dysfunction and Disc List  Principal Problem:    Syncope and collapse  Active Problems:    Traumatic hemorrhage of cerebrum with loss of consciousness of 30 minutes or less, unspecified laterality, initial encounter [facility])      Past Medical History  Past Medical History:   Diag Normal  Dynamic Sitting: Normal  Static Standing: Good  Dynamic Standing: Fair +    ACTIVITY TOLERANCE  Pulse: 94  Heart Rate Source: Monitor  Resp: 22  BP: 137/77  BP Location: Right arm  BP Method: Automatic  Patient Position: Sitting    AM-PAC '6-Clicks

## 2020-09-17 ENCOUNTER — TELEPHONE (OUTPATIENT)
Dept: INTERNAL MEDICINE CLINIC | Facility: CLINIC | Age: 70
End: 2020-09-17

## 2020-09-17 ENCOUNTER — PATIENT OUTREACH (OUTPATIENT)
Dept: CASE MANAGEMENT | Age: 70
End: 2020-09-17

## 2020-09-17 DIAGNOSIS — Z02.9 ENCOUNTERS FOR UNSPECIFIED ADMINISTRATIVE PURPOSE: ICD-10-CM

## 2020-09-17 DIAGNOSIS — S06.361A TRAUMATIC HEMORRHAGE OF CEREBRUM WITH LOSS OF CONSCIOUSNESS OF 30 MINUTES OR LESS, UNSPECIFIED LATERALITY, INITIAL ENCOUNTER (HCC): ICD-10-CM

## 2020-09-17 DIAGNOSIS — R55 SYNCOPE AND COLLAPSE: ICD-10-CM

## 2020-09-17 DIAGNOSIS — B20 HUMAN IMMUNODEFICIENCY VIRUS (HIV) DISEASE (HCC): ICD-10-CM

## 2020-09-17 PROCEDURE — 1111F DSCHRG MED/CURRENT MED MERGE: CPT

## 2020-09-17 NOTE — PROGRESS NOTES
NCM placed call to patient for TCM, LM requesting a call to [phone] back with a condition update.

## 2020-09-17 NOTE — PROGRESS NOTES
Initial Post Discharge Follow Up   Discharge Date: 9/16/20  Contact Date: 9/17/2020    Consent Verification:  Assessment Completed With: Patient  HIPAA Verified?   Yes    Discharge Dx:     Syncope and collapse  Traumatic hemorrhage of cerebrum with LOC o questions about your discharge instructions? No  • Before leaving the hospital was your diagnoses explained to you? Yes  • Do you have any questions about your diagnoses?  No  • Are you able to perform normal daily activities of living as you have prior to yes  o Do you have any questions about your new medication? No  • Did you  your discharge medications when you left the hospital? Yes  • May I go over your medications with you to make sure we are not missing anything? yes  • Are there any reasons th Discharge Summary, Discharge medications reviewed/discussed/and reconciled against outpatient medications with patient,  and orders reviewed and discussed. Any changes or updates to medications and or orders sent to PCP.      For patients with TCC appointme

## 2020-09-17 NOTE — TELEPHONE ENCOUNTER
Patient discharged home from [facility] on 9/16/2020 and is at High risk for readmission and recommended to have a TCM HFU by 9/23/2020 preferred or no later than 9/30/2020 or sooner.  GRANT attempted to schedule a TCM HFU due to MD's limited schedule

## 2020-09-18 ENCOUNTER — TELEPHONE (OUTPATIENT)
Dept: CARDIOLOGY | Age: 70
End: 2020-09-18

## 2020-09-18 NOTE — DISCHARGE SUMMARY
[facility]    PATIENT'S NAME: Flip Alas   ATTENDING PHYSICIAN: Za Zimmerman MD   PATIENT ACCOUNT#:   [id number]    LOCATION:  [address] [place] [address] #:   [id number]       YOB: 1950  ADMISSION DATE:       09/ Stable. CODE STATUS:  Full Code. DISCHARGE MEDICATIONS:    1. Albuterol 2 puffs 4 times a day as needed for shortness of breath. 2.   Betamethasone 0.05% external ointment to rash twice a day. Use no longer than 2 weeks.   3.   Clotrimazole 1/005%

## 2020-09-28 ENCOUNTER — PATIENT OUTREACH (OUTPATIENT)
Dept: CASE MANAGEMENT | Age: 70
End: 2020-09-28

## 2020-09-28 NOTE — PROGRESS NOTES
Attempted contacting pt to intro [facility] services with no answer. Will continue contacting to discuss.

## 2020-09-29 ENCOUNTER — PATIENT OUTREACH (OUTPATIENT)
Dept: CASE MANAGEMENT | Age: 70
End: 2020-09-29

## 2020-09-30 ENCOUNTER — OFFICE VISIT (OUTPATIENT)
Dept: INTERNAL MEDICINE CLINIC | Facility: CLINIC | Age: 70
End: 2020-09-30
Payer: MEDICARE

## 2020-09-30 VITALS
WEIGHT: 130.63 LBS | DIASTOLIC BLOOD PRESSURE: 88 MMHG | HEART RATE: 89 BPM | OXYGEN SATURATION: 98 % | SYSTOLIC BLOOD PRESSURE: 130 MMHG | BODY MASS INDEX: 27.42 KG/M2 | HEIGHT: 58 IN

## 2020-09-30 DIAGNOSIS — E03.9 HYPOTHYROIDISM, UNSPECIFIED TYPE: ICD-10-CM

## 2020-09-30 DIAGNOSIS — F32.A DEPRESSION, UNSPECIFIED DEPRESSION TYPE: ICD-10-CM

## 2020-09-30 DIAGNOSIS — Z23 NEED FOR VACCINATION: ICD-10-CM
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
## 2020-09-30 DIAGNOSIS — Z09 HOSPITAL DISCHARGE FOLLOW-UP: Primary | ICD-10-CM

## 2020-09-30 DIAGNOSIS — I60.9 SAH (SUBARACHNOID HEMORRHAGE) (HCC): ICD-10-CM

## 2020-09-30 DIAGNOSIS — S00.03XA HEMATOMA OF SCALP, INITIAL ENCOUNTER: ICD-10-CM

## 2020-09-30 DIAGNOSIS — R55 SYNCOPE, UNSPECIFIED SYNCOPE TYPE: ICD-10-CM

## 2020-09-30 PROCEDURE — 99495 TRANSJ CARE MGMT MOD F2F 14D: CPT | Performed by: INTERNAL MEDICINE

## 2020-09-30 PROCEDURE — 1111F DSCHRG MED/CURRENT MED MERGE: CPT | Performed by: INTERNAL MEDICINE

## 2020-09-30 NOTE — PROGRESS NOTES
.mt  HPI:    Toñito Fournier is a 78year old female here today for hospital follow up.    She was discharged from Inpatient hospital, [facility]  to Home   Admission Date: 9/13/20   Discharge Date: 9/16/20  Hospital Discharge Diagnoses (since 8/3 headache   No chest pain   No palpitations   Having the event monitor   Has follow up appt with cardiology       Hypothyroidism   On levothyroxine         Also patient lost her cat couple of months ago   Has been feeling down worse since the pandemic   Fee immunodeficiency virus infection) ([facility] [address] Internal hemorrhoids (10/20/2017), Tinnitus, and Visual impairment. She  has a past surgical history that includes ; egd (2018); colonoscopy (N/A, 10/20/2017); Eye surgery (Right, ); and cataract. hospital chart notes, consults, imaging   Advised to follow up with neurology   Stable   Has event monitor   Has follow up appt with cardiology   - PNEUMOCOCCAL IMM (PNEUMOVAX)  - FLU VACC HIGH DOSE PRSV FREE  - Sertraline HCl 50 MG Oral Tab;  Take 1 tablet FREE        Transitional Care Management Certification:  I certify that the following are true:  Communication with the patient was made within 2 business days of discharge on date above   Medical Decision Making- Based on service period of discharge to 30

## 2020-10-07 ENCOUNTER — PATIENT OUTREACH (OUTPATIENT)
Dept: CASE MANAGEMENT | Age: 70
End: 2020-10-07

## 2020-10-07 DIAGNOSIS — B20 HUMAN IMMUNODEFICIENCY VIRUS (HIV) DISEASE (HCC): ICD-10-CM

## 2020-10-07 DIAGNOSIS — E03.4 HYPOTHYROIDISM DUE TO ACQUIRED ATROPHY OF THYROID: ICD-10-CM

## 2020-10-21 DIAGNOSIS — E03.9 HYPOTHYROIDISM, UNSPECIFIED TYPE: ICD-10-CM

## 2020-10-21 RX ORDER — LEVOTHYROXINE SODIUM 0.07 MG/1
75 TABLET ORAL
Qty: 90 TABLET | Refills: 0 | Status: SHIPPED | OUTPATIENT
Start: 2020-10-21 | End: 2021-01-28

## 2020-10-27 ENCOUNTER — HOSPITAL ENCOUNTER (OUTPATIENT)
Age: 70
Discharge: HOME | End: 2020-10-27
Attending: EMERGENCY MEDICINE
Payer: MEDICARE

## 2020-10-27 VITALS
RESPIRATION RATE: 16 BRPM | SYSTOLIC BLOOD PRESSURE: 154 MMHG | DIASTOLIC BLOOD PRESSURE: 85 MMHG | TEMPERATURE: 97 F | OXYGEN SATURATION: 100 % | HEART RATE: 88 BPM

## 2020-10-27 DIAGNOSIS — L30.9 ECZEMA, UNSPECIFIED TYPE: ICD-10-CM

## 2020-10-27 DIAGNOSIS — Z20.822 ENCOUNTER FOR LABORATORY TESTING FOR COVID-19 VIRUS: Primary | ICD-10-CM

## 2020-10-27 PROCEDURE — 99213 OFFICE O/P EST LOW 20 MIN: CPT | Performed by: EMERGENCY MEDICINE

## 2020-10-27 RX ORDER — BETAMETHASONE DIPROPIONATE 0.5 MG/G
0.5 CREAM TOPICAL 2 TIMES DAILY
Qty: 15 G | Refills: 0 | Status: SHIPPED | OUTPATIENT
Start: 2020-10-27 | End: 2020-11-01

## 2020-10-27 NOTE — ED PROVIDER NOTES
Patient Seen in: [facility]      History   Patient presents with:  Rash Skin Problem    Stated Complaint: rash    HPI     Patient is a 53-year-old female who presents to immediate care because of a itchy rash localized to the lumbar area.   No Extraocular Movements: Extraocular movements intact. Pupils: Pupils are equal, round, and reactive to light. Neck:      Musculoskeletal: Normal range of motion and neck supple.    Cardiovascular:      Rate and Rhythm: Normal rate and regular rhyth

## 2020-10-31 PROCEDURE — 99490 CHRNC CARE MGMT STAFF 1ST 20: CPT

## 2020-11-06 ENCOUNTER — PATIENT OUTREACH (OUTPATIENT)
Dept: CASE MANAGEMENT | Age: 70
End: 2020-11-06

## 2020-11-06 NOTE — PROGRESS NOTES
Called patient at patient request will call her back on Monday 11/9/2020 at 1:10pm Reviewed patient chart.        Time Spent This Encounter Total: 5  min medical record review  Monthly Minute Total including today: 5 minutes

## 2020-11-12 ENCOUNTER — IMMUNIZATION (OUTPATIENT)
Dept: INTERNAL MEDICINE CLINIC | Facility: CLINIC | Age: 70
End: 2020-11-12
Payer: MEDICARE

## 2020-11-12 DIAGNOSIS — Z23 NEED FOR VACCINATION: ICD-10-CM

## 2020-11-12 PROCEDURE — G0008 ADMIN INFLUENZA VIRUS VAC: HCPCS | Performed by: INTERNAL MEDICINE

## 2020-11-12 PROCEDURE — 90662 IIV NO PRSV INCREASED AG IM: CPT | Performed by: INTERNAL MEDICINE

## 2020-11-13 ENCOUNTER — TELEPHONE (OUTPATIENT)
Dept: CARDIOLOGY | Age: 70
End: 2020-11-13

## 2020-11-13 PROCEDURE — 93272 ECG/REVIEW INTERPRET ONLY: CPT | Performed by: INTERNAL MEDICINE

## 2020-11-18 ENCOUNTER — PATIENT MESSAGE (OUTPATIENT)
Dept: INTERNAL MEDICINE CLINIC | Facility: CLINIC | Age: 70
End: 2020-11-18

## 2020-11-18 DIAGNOSIS — Z01.83 ENCOUNTER FOR BLOOD TYPING: Primary | ICD-10-CM

## 2020-11-18 NOTE — TELEPHONE ENCOUNTER
From: Nuvia Sanchez  Sent: 11/18/2020 7:50 AM CST  To: Em Rn Triage  Subject: RE: Non-Urgent Medical Question    I had blood transfusion in 1993    ----- Message -----  From: Tim Ferrer  Sent: 11/18/20, 7:38 AM  To: Nuvia Sanchez  Subject: RE: Non-U

## 2020-11-19 NOTE — TELEPHONE ENCOUNTER
Jarod Bryan 11/18/2020 8:22 AM CST      ----- Message -----  From: Bulmaro Channel  Sent: 11/18/2020 5:09 AM CST  To: Blade Abdi Clinical Staff  Subject: Non-Urgent Medical Question     Want to know my blood type.  Can you order a blood test. And can I claudia

## 2020-11-21 ENCOUNTER — TELEPHONE (OUTPATIENT)
Dept: INTERNAL MEDICINE CLINIC | Facility: CLINIC | Age: 70
End: 2020-11-21

## 2020-11-21 NOTE — TELEPHONE ENCOUNTER
Patient has an appointment today 11/21 with a nurse for a pneumonia shot at [place] [phone]. Patient wants to make sure pneumonia vaccine is in stock. CSS contacted [employer], however, no answer. Please advise.

## 2020-11-30 ENCOUNTER — PATIENT OUTREACH (OUTPATIENT)
Dept: CASE MANAGEMENT | Age: 70
End: 2020-11-30

## 2020-11-30 NOTE — PROGRESS NOTES
Called patient and left a message for patient to call back when they can. Reviewed patient chart.  Left contact my contact number [phone]        Time Spent This Encounter Total: 3  min medical record review  Monthly Minute Total including today: 8 cornelia

## 2020-12-01 ENCOUNTER — NURSE ONLY (OUTPATIENT)
Dept: INTERNAL MEDICINE CLINIC | Facility: CLINIC | Age: 70
End: 2020-12-01
Payer: MEDICARE

## 2020-12-01 DIAGNOSIS — Z23 IMMUNIZATION DUE: Primary | ICD-10-CM

## 2020-12-01 PROCEDURE — 90732 PPSV23 VACC 2 YRS+ SUBQ/IM: CPT | Performed by: PHYSICIAN ASSISTANT

## 2020-12-01 PROCEDURE — G0009 ADMIN PNEUMOCOCCAL VACCINE: HCPCS | Performed by: PHYSICIAN ASSISTANT

## 2020-12-08 ENCOUNTER — MED REC SCAN ONLY (OUTPATIENT)
Dept: INTERNAL MEDICINE CLINIC | Facility: CLINIC | Age: 70
End: 2020-12-08

## 2020-12-28 ENCOUNTER — PATIENT OUTREACH (OUTPATIENT)
Dept: CASE MANAGEMENT | Age: 70
End: 2020-12-28

## 2020-12-28 NOTE — PROGRESS NOTES
Called patient and left a message for patient to call back when they can. Reviewed patient chart.  Left contact my contact number [phone]        Time Spent This Encounter Total: 5  min medical record review  Monthly Minute Total including today: 5 cornelia

## 2021-01-05 ENCOUNTER — NURSE TRIAGE (OUTPATIENT)
Dept: INTERNAL MEDICINE CLINIC | Facility: CLINIC | Age: 71
End: 2021-01-05

## 2021-01-05 NOTE — TELEPHONE ENCOUNTER
Action Requested: Summary for Provider     []  Critical Lab, Recommendations Needed  [] Need Additional Advice  []   FYI    []   Need Orders  [] Need Medications Sent to Pharmacy  []  Other     SUMMARY:    Virtual appointment made for tomorrow 1/6/2020

## 2021-01-06 ENCOUNTER — TELEMEDICINE (OUTPATIENT)
Dept: INTERNAL MEDICINE CLINIC | Facility: CLINIC | Age: 71
End: 2021-01-06

## 2021-01-06 DIAGNOSIS — F32.A ANXIETY AND DEPRESSION: ICD-10-CM

## 2021-01-06 DIAGNOSIS — F41.9 ANXIETY AND DEPRESSION: ICD-10-CM

## 2021-01-06 DIAGNOSIS — R21 RASH: Primary | ICD-10-CM

## 2021-01-06 PROCEDURE — 99213 OFFICE O/P EST LOW 20 MIN: CPT | Performed by: INTERNAL MEDICINE

## 2021-01-06 NOTE — PROGRESS NOTES
Patient ID: Manolo Tillman is a 78year old female. Patient presents with:  Rash         HISTORY OF PRESENT ILLNESS:   Patient presents for above. This visit is conducted using Telemedicine with live, interactive video and audio.   C/c rash   C/o gretel tablet (75 mcg total) by mouth every morning before breakfast., Disp: 90 tablet, Rfl: 0  •  acetaminophen 325 MG Oral Tab, Take 2 tablets (650 mg total) by mouth every 6 (six) hours as needed. , Disp: 1 tablet, Rfl: 0  •  Meclizine HCl 25 MG Oral Tab, Take Drinks per session: 1 or 2        Comment: occasional wine      Drug use: No      Sexual activity: Not on file    Lifestyle      Physical activity        Days per week: Not on file        Minutes per session: Not on file      Stress: Not on file    Relatio encounter  20 minutes   Video time 20 minutes   Documentation time 20 minutes     ANGELA Irahetaaudra Barr understands video evaluation is not a substitute for face-to-face examination or emergency care.  Patient advised to go to ER or call 911 for worsening symp

## 2021-01-14 ENCOUNTER — OFFICE VISIT (OUTPATIENT)
Dept: INTERNAL MEDICINE CLINIC | Facility: CLINIC | Age: 71
End: 2021-01-14
Payer: MEDICARE

## 2021-01-14 VITALS
WEIGHT: 133 LBS | HEIGHT: 58 IN | SYSTOLIC BLOOD PRESSURE: 138 MMHG | DIASTOLIC BLOOD PRESSURE: 85 MMHG | BODY MASS INDEX: 27.92 KG/M2 | HEART RATE: 91 BPM | RESPIRATION RATE: 15 BRPM

## 2021-01-14 DIAGNOSIS — F32.A ANXIETY AND DEPRESSION: ICD-10-CM

## 2021-01-14 DIAGNOSIS — I77.1 TORTUOUS AORTA (HCC): ICD-10-CM

## 2021-01-14 DIAGNOSIS — K64.8 INTERNAL HEMORRHOIDS: ICD-10-CM

## 2021-01-14 DIAGNOSIS — E03.9 HYPOTHYROIDISM, UNSPECIFIED TYPE: ICD-10-CM

## 2021-01-14 DIAGNOSIS — Z12.31 SCREENING MAMMOGRAM, ENCOUNTER FOR: ICD-10-CM

## 2021-01-14 DIAGNOSIS — F41.9 ANXIETY AND DEPRESSION: ICD-10-CM

## 2021-01-14 DIAGNOSIS — Z00.00 ENCOUNTER FOR ANNUAL HEALTH EXAMINATION: Primary | ICD-10-CM

## 2021-01-14 DIAGNOSIS — B20 HUMAN IMMUNODEFICIENCY VIRUS (HIV) DISEASE (HCC): ICD-10-CM

## 2021-01-14 PROBLEM — S06.361A TRAUMATIC HEMORRHAGE OF CEREBRUM WITH LOSS OF CONSCIOUSNESS OF 30 MINUTES OR LESS, UNSPECIFIED LATERALITY, INITIAL ENCOUNTER (HCC): Status: RESOLVED | Noted: 2020-09-13 | Resolved: 2021-01-14

## 2021-01-14 PROBLEM — R55 SYNCOPE AND COLLAPSE: Status: RESOLVED | Noted: 2020-09-13 | Resolved: 2021-01-14

## 2021-01-14 PROCEDURE — G0439 PPPS, SUBSEQ VISIT: HCPCS | Performed by: INTERNAL MEDICINE

## 2021-01-14 NOTE — PATIENT INSTRUCTIONS
ANGELA Cha's SCREENING SCHEDULE   Tests on this list are recommended by your physician but may not be covered, or covered at this frequency, by your insurer. Please check with your insurance carrier before scheduling to verify coverage.    PREVENT Covered every 10 years- more often if abnormal Colonoscopy due on 10/23/2027 Update Health Maintenance if applicable    Flex Sigmoidoscopy Screen  Covered every 5 years No results found for this or any previous visit. No flowsheet data found.      Fecal O VACCINE, QUAD, PRESERVATIVE FREE, 0.5 ML   Orders placed or performed in visit on 09/20/18   • FLU VACC HIGH DOSE PRSV FREE   Orders placed or performed in visit on 10/22/16   • FLU VAC NO PRSV 4 TEE 3 YRS+    Please get every year    Pneumococcal 13 (Prev it's website for anyone to review and print using their home computer and printer. (the forms are also available in [address])  [email or web address]  This link also has information from the [facility] regarding Advance Directives.

## 2021-01-14 NOTE — PROGRESS NOTES
HPI:   Soraya Edgar is a 78year old female who presents for a Medicare Subsequent Annual Wellness visit (Pt already had Initial Annual Wellness).         Gives h/o pssing out and being admitted from 9/13 to 9/15/2020  -- she had fallen in the after NOT have a Power of  for [employer] on file in [place]. The patient has this document but we do not have it in [place], and patient is instructed to get our office a copy of it for scanning into Epic.        She has never smoked tobacco.    CAGE Alcoho as needed for Dizziness. •  Emtricitabine-Tenofovir AF (DESCOVY OR), Take by mouth. •  Darunavir-Cobicistat (PREZCOBIX OR), Take by mouth. •  Etravirine (INTELENCE OR), Take by mouth.     •  LEVOCETIRIZINE DIHYDROCHLORIDE 5 MG Oral Tab, TAKE 1 TABL eats and also the medications, + heartburn-takes over-the-counter omeprazole but  : denies dysuria, vaginal discharge or itching, no complaint of urinary incontinence   MUSCULOSKELETAL: denies back pain  NEURO: denies headaches  PSYCHE: +  depression  < friends have told me they think I may have hearing loss:  No                Visual Acuity                           Physical Exam   GENERAL: well developed, well nourished,in no apparent distress  SKIN: no rashes,no suspicious lesions  HEENT: atraumatic, no ordered for her–advised her to get this done  Hypothyroidism, unspecified type  Stable on medications, continue  Anxiety and depression  Stable  Internal hemorrhoids  Stable    Tortuous aorta (HCC)  Stable, consider aspirin statin            Preventative m this or any previous visit. No flowsheet data found. Fecal Occult Blood Annually No results found for: FOB No flowsheet data found.     Glaucoma Screening      Ophthalmology Visit Annually: Diabetics, FHx Glaucoma, AA>50, > 65 No flowsheet data ASSESSMENT FEMALE Trini Ma [[id number]]

## 2021-01-28 ENCOUNTER — PATIENT MESSAGE (OUTPATIENT)
Dept: INTERNAL MEDICINE CLINIC | Facility: CLINIC | Age: 71
End: 2021-01-28

## 2021-01-28 DIAGNOSIS — E03.9 HYPOTHYROIDISM, UNSPECIFIED TYPE: ICD-10-CM

## 2021-01-28 RX ORDER — LEVOTHYROXINE SODIUM 0.07 MG/1
75 TABLET ORAL
Qty: 90 TABLET | Refills: 1 | Status: SHIPPED | OUTPATIENT
Start: 2021-01-28 | End: 2021-04-27

## 2021-01-28 NOTE — TELEPHONE ENCOUNTER
From: Pedrito Greene  To:  Nicole Aguayo MD  Sent: 1/28/2021 1:12 PM CST  Subject: Non-Urgent Medical Question    Need to get my thyroid medicine refilled @ [facility] pharmacy in [place]

## 2021-02-10 ENCOUNTER — PATIENT OUTREACH (OUTPATIENT)
Dept: CASE MANAGEMENT | Age: 71
End: 2021-02-10

## 2021-02-10 NOTE — PROGRESS NOTES
Called patient unable to talk at the moment will call back tomorrow after 1pm. Reviewed patient chart.  Left contact my contact number [phone]        Time Spent This Encounter Total: 5  min medical record review  Monthly Minute Total including today: 5

## 2021-03-03 DIAGNOSIS — Z23 NEED FOR VACCINATION: ICD-10-CM

## 2021-03-08 ENCOUNTER — IMMUNIZATION (OUTPATIENT)
Dept: LAB | Age: 71
End: 2021-03-08
Attending: HOSPITALIST
Payer: MEDICARE

## 2021-03-08 DIAGNOSIS — Z23 NEED FOR VACCINATION: Primary | ICD-10-CM

## 2021-03-08 PROCEDURE — 0001A SARSCOV2 VAC 30MCG/0.3ML IM: CPT

## 2021-03-29 ENCOUNTER — IMMUNIZATION (OUTPATIENT)
Dept: LAB | Age: 71
End: 2021-03-29
Attending: HOSPITALIST
Payer: MEDICARE

## 2021-03-29 DIAGNOSIS — Z23 NEED FOR VACCINATION: Primary | ICD-10-CM

## 2021-03-29 PROCEDURE — 0002A SARSCOV2 VAC 30MCG/0.3ML IM: CPT

## 2021-04-12 ENCOUNTER — PATIENT OUTREACH (OUTPATIENT)
Dept: CASE MANAGEMENT | Age: 71
End: 2021-04-12

## 2021-04-12 NOTE — PROGRESS NOTES
4/12/2021 Called patient and left a message for patient to call back when they can. Reviewed patient chart.  Left contact my contact number [phone]        Time Spent This Encounter Total: 5  min medical record review  Monthly Minute Total including tod

## 2021-04-27 DIAGNOSIS — E03.9 HYPOTHYROIDISM, UNSPECIFIED TYPE: ICD-10-CM

## 2021-04-27 DIAGNOSIS — E03.9 HYPOTHYROIDISM, UNSPECIFIED TYPE: Primary | ICD-10-CM

## 2021-04-27 RX ORDER — LEVOTHYROXINE SODIUM 0.07 MG/1
75 TABLET ORAL
Qty: 90 TABLET | Refills: 1 | Status: SHIPPED | OUTPATIENT
Start: 2021-04-27 | End: 2021-10-19

## 2021-04-27 RX ORDER — ALBUTEROL SULFATE 90 UG/1
2 AEROSOL, METERED RESPIRATORY (INHALATION)
Qty: 18 G | Refills: 0 | OUTPATIENT
Start: 2021-04-27

## 2021-04-27 RX ORDER — FLUTICASONE PROPIONATE 50 MCG
2 SPRAY, SUSPENSION (ML) NASAL DAILY
Qty: 1 BOTTLE | Refills: 0 | OUTPATIENT
Start: 2021-04-27

## 2021-04-27 RX ORDER — LEVOTHYROXINE SODIUM 0.07 MG/1
75 TABLET ORAL
Qty: 90 TABLET | Refills: 1 | OUTPATIENT
Start: 2021-04-27

## 2021-04-27 RX ORDER — FLUTICASONE PROPIONATE AND SALMETEROL 113; 14 UG/1; UG/1
1 POWDER, METERED RESPIRATORY (INHALATION) 2 TIMES DAILY
Qty: 1 EACH | Refills: 0 | OUTPATIENT
Start: 2021-04-27

## 2021-04-27 NOTE — TELEPHONE ENCOUNTER
Refill request tinnitus patient has not been seen over in the prior year. Patient will need a follow-up appointment for refills.   Patient may consider follow-up with PCP prior to then if needed

## 2021-04-27 NOTE — TELEPHONE ENCOUNTER
Message left on patient's voicemail asking that patient refill requests as below were denied by Dr. Jenaro Olivas.       Patient informed on message that she is due for f/u appointment, has not been seen in 16 months for Allergy f/u appointment and Dr. Red Sanchez

## 2021-04-27 NOTE — TELEPHONE ENCOUNTER
Patient last seen in Allergy 1/16/2020 for .  . .    Moderate persistent extrinsic asthma without complication  (primary encounter diagnosis)  Sob (shortness of breath)  Mild persistent extrinsic asthma without complication  Allergic rhinitis due to dust mi

## 2021-04-28 RX ORDER — FLUTICASONE PROPIONATE 50 MCG
2 SPRAY, SUSPENSION (ML) NASAL DAILY
Qty: 1 BOTTLE | Refills: 0 | Status: SHIPPED | OUTPATIENT
Start: 2021-04-28 | End: 2021-05-24

## 2021-04-28 RX ORDER — FLUTICASONE PROPIONATE AND SALMETEROL 113; 14 UG/1; UG/1
1 POWDER, METERED RESPIRATORY (INHALATION) 2 TIMES DAILY
Qty: 1 EACH | Refills: 0 | Status: SHIPPED | OUTPATIENT
Start: 2021-04-28 | End: 2021-07-17

## 2021-04-28 RX ORDER — ALBUTEROL SULFATE 90 UG/1
2 AEROSOL, METERED RESPIRATORY (INHALATION)
Qty: 18 G | Refills: 0 | Status: SHIPPED | OUTPATIENT
Start: 2021-04-28

## 2021-04-28 NOTE — TELEPHONE ENCOUNTER
Refill requested for FLUTICASONE PROPIONATE 50 MCG/ACT Nasal Suspension Damari Robison MD]     [employer] SULFATE  (26 Base) MCG/ACT Inhalation Aero Soln Damari Robison MD]         Fluticasone-Salmeterol [facility] RESPICLICK [phone]) [phone]

## 2021-04-30 ENCOUNTER — LAB ENCOUNTER (OUTPATIENT)
Dept: LAB | Age: 71
End: 2021-04-30
Attending: INTERNAL MEDICINE
Payer: MEDICARE

## 2021-04-30 DIAGNOSIS — E03.9 HYPOTHYROIDISM, UNSPECIFIED TYPE: ICD-10-CM

## 2021-04-30 PROCEDURE — 84443 ASSAY THYROID STIM HORMONE: CPT

## 2021-04-30 PROCEDURE — 36415 COLL VENOUS BLD VENIPUNCTURE: CPT

## 2021-05-22 ENCOUNTER — TELEPHONE (OUTPATIENT)
Dept: ALLERGY | Facility: CLINIC | Age: 71
End: 2021-05-22

## 2021-05-22 NOTE — TELEPHONE ENCOUNTER
Refill requested for    Name from pharmacy: FLUTICASONE PROP 50 MCG SPRAY         Will file in chart as: FLUTICASONE PROPIONATE 50 MCG/ACT Nasal Suspension    Si sprays by Nasal route daily.     Disp:  Not specified (Pharmacy requested: 16 mL)    Refill

## 2021-05-24 RX ORDER — FLUTICASONE PROPIONATE 50 MCG
2 SPRAY, SUSPENSION (ML) NASAL DAILY
Qty: 1 BOTTLE | Refills: 0 | Status: SHIPPED | OUTPATIENT
Start: 2021-05-24 | End: 2021-06-09

## 2021-06-07 ENCOUNTER — TELEPHONE (OUTPATIENT)
Dept: ALLERGY | Facility: CLINIC | Age: 71
End: 2021-06-07

## 2021-06-07 NOTE — TELEPHONE ENCOUNTER
Form received from [facility] pharmacy requesting to change the below refill to a 90 day supply. Per below Dr. Lena Shea only authorized 1 month supply because pt is due for follow up.      Form completed stating 90 day supply is not authorized and placed on Dr. Lena Shea

## 2021-06-07 NOTE — TELEPHONE ENCOUNTER
Signed form faxed to [facility] pharmacy. Confirmation received at [phone]. Sent to medical records for scanning.

## 2021-06-09 RX ORDER — FLUTICASONE PROPIONATE 50 MCG
2 SPRAY, SUSPENSION (ML) NASAL DAILY
Qty: 1 EACH | Refills: 1 | Status: SHIPPED | OUTPATIENT
Start: 2021-06-09 | End: 2022-01-11

## 2021-06-09 NOTE — TELEPHONE ENCOUNTER
•  Fluticasone Propionate 50 MCG/ACT Nasal Suspension, 2 sprays by Nasal route daily. , Disp: 1 Bottle, Rfl: 0

## 2021-06-09 NOTE — TELEPHONE ENCOUNTER
Refill requested for  Fluticasone Propionate 50 MCG/ACT Nasal Suspension, 2 sprays by Nasal route daily. , Disp: 1 Bottle, Rfl: 0    5/22/2021 a 30 day refill was authorized because patient is due for a follow up appointment.  Pt has a follow up scheduled fo

## 2021-06-11 ENCOUNTER — HOSPITAL ENCOUNTER (OUTPATIENT)
Dept: MAMMOGRAPHY | Facility: HOSPITAL | Age: 71
Discharge: HOME | End: 2021-06-11
Attending: INTERNAL MEDICINE
Payer: MEDICARE

## 2021-06-11 DIAGNOSIS — Z12.31 SCREENING MAMMOGRAM, ENCOUNTER FOR: ICD-10-CM

## 2021-06-11 PROCEDURE — 77067 SCR MAMMO BI INCL CAD: CPT | Performed by: INTERNAL MEDICINE

## 2021-06-11 PROCEDURE — 77063 BREAST TOMOSYNTHESIS BI: CPT | Performed by: INTERNAL MEDICINE

## 2021-06-15 ENCOUNTER — PATIENT OUTREACH (OUTPATIENT)
Dept: CASE MANAGEMENT | Age: 71
End: 2021-06-15

## 2021-06-15 NOTE — PROGRESS NOTES
Called patient and left a message for patient to call back when they can. Reviewed patient chart.  Left contact my contact number [phone]        Time Spent This Encounter Total: 5  min medical record review  Monthly Minute Total including today: 5 cornelia

## 2021-07-17 ENCOUNTER — TELEPHONE (OUTPATIENT)
Dept: ALLERGY | Facility: CLINIC | Age: 71
End: 2021-07-17

## 2021-07-17 ENCOUNTER — OFFICE VISIT (OUTPATIENT)
Dept: ALLERGY | Facility: CLINIC | Age: 71
End: 2021-07-17
Payer: MEDICARE

## 2021-07-17 VITALS
DIASTOLIC BLOOD PRESSURE: 79 MMHG | SYSTOLIC BLOOD PRESSURE: 120 MMHG | HEIGHT: 58 IN | WEIGHT: 133 LBS | BODY MASS INDEX: 27.92 KG/M2 | OXYGEN SATURATION: 98 % | HEART RATE: 83 BPM

## 2021-07-17 DIAGNOSIS — J30.81 ALLERGIC RHINITIS DUE TO ANIMAL DANDER: ICD-10-CM

## 2021-07-17 DIAGNOSIS — B20 HUMAN IMMUNODEFICIENCY VIRUS (HIV) DISEASE (HCC): ICD-10-CM

## 2021-07-17 DIAGNOSIS — J45.40 MODERATE PERSISTENT EXTRINSIC ASTHMA WITHOUT COMPLICATION: Primary | ICD-10-CM

## 2021-07-17 DIAGNOSIS — J30.89 ALLERGIC RHINITIS DUE TO DUST MITE: ICD-10-CM

## 2021-07-17 PROCEDURE — 99214 OFFICE O/P EST MOD 30 MIN: CPT | Performed by: ALLERGY & IMMUNOLOGY

## 2021-07-17 RX ORDER — ALBUTEROL SULFATE 90 UG/1
2 AEROSOL, METERED RESPIRATORY (INHALATION) EVERY 6 HOURS PRN
Qty: 1 EACH | Refills: 0 | Status: SHIPPED | OUTPATIENT
Start: 2021-07-17 | End: 2022-01-11

## 2021-07-17 RX ORDER — FLUTICASONE PROPIONATE AND SALMETEROL 232; 14 UG/1; UG/1
1 POWDER, METERED RESPIRATORY (INHALATION) 2 TIMES DAILY
Qty: 3 EACH | Refills: 0 | Status: SHIPPED | OUTPATIENT
Start: 2021-07-17 | End: 2022-01-11

## 2021-07-17 RX ORDER — EMTRICITABINE AND TENOFOVIR ALAFENAMIDE 200; 25 MG/1; MG/1
TABLET ORAL
COMMUNITY
Start: 2021-06-30

## 2021-07-17 RX ORDER — FLUTICASONE PROPIONATE 50 MCG
2 SPRAY, SUSPENSION (ML) NASAL DAILY
Qty: 3 EACH | Refills: 1 | Status: SHIPPED | OUTPATIENT
Start: 2021-07-17

## 2021-07-17 RX ORDER — DARUNAVIR ETHANOLATE AND COBICISTAT 800; 150 MG/1; MG/1
TABLET, FILM COATED ORAL
COMMUNITY
Start: 2021-05-05

## 2021-07-17 RX ORDER — MONTELUKAST SODIUM 10 MG/1
10 TABLET ORAL NIGHTLY
Qty: 90 TABLET | Refills: 1 | Status: SHIPPED | OUTPATIENT
Start: 2021-07-17

## 2021-07-17 NOTE — PATIENT INSTRUCTIONS
1. ASTHMA  Patient symptoms over the past 2 weeks including coughing and shortness of breath. No fevers or mucopurulence.   Increase air duo to 232/14 1 puff twice a day  Continue with Singulair, montelukast 10 mg once a day  Albuterol 2 puffs every 4-6 ho

## 2021-07-17 NOTE — PROGRESS NOTES
Bulmaro Johnson is a 73year old female.     HPI:   Patient presents with:  Asthma: patient presents for asthma check up and allergy follow up, patient states is doing good, at times has shortness of breath       Patient is a 68-year-old female who prese • Diabetes Sister    • Other (cervical cancer) Maternal Cousin Male         pre josefa      Social History: Social History    Tobacco Use      Smoking status: Never Smoker      Smokeless tobacco: Never Used      Tobacco comment: Pt denies passive smoke ex hpi  Cardiovascular:  Negative for irregular heartbeat/palpitations, chest pain, edema  Constitutional:  Negative night sweats,weight loss, irritability and lethargy  ENMT:  Negative for ear drainage, hearing loss and nasal drainage  Eyes:  Negative for ey avoidance measures and potential treatment option of immunotherapy    3. HIV  Patient reports viral loads are undetectable with current regimen. Followed by infectious disease. No opportunistic infections in the interim.     4.  Vaccinations  Flu vaccine

## 2021-07-17 NOTE — TELEPHONE ENCOUNTER
Left message with pharmacy that Dr. Juaquin Mcmahan noted the interaction between the medication, and to proceed with RX. Contact us with further questions. We will return Monday at 0900.

## 2021-07-17 NOTE — TELEPHONE ENCOUNTER
Yes message reviewed and noted. Okay to use Flonase and airduo . Interaction previously reviewed.    He is

## 2021-07-17 NOTE — TELEPHONE ENCOUNTER
Fili/ Pharmacist of [facility] there is a drug interaction between medications Fluticasone Propionate (FLONASE) 50 MCG/ACT Nasal Suspensio and PREZCOBIX 800-150 MG Oral Tab. Sebastien Roe wants to confirm if okay to dispense medication.

## 2021-07-22 ENCOUNTER — PATIENT OUTREACH (OUTPATIENT)
Dept: CASE MANAGEMENT | Age: 71
End: 2021-07-22

## 2021-07-22 RX ORDER — BUDESONIDE AND FORMOTEROL FUMARATE DIHYDRATE 160; 4.5 UG/1; UG/1
2 AEROSOL RESPIRATORY (INHALATION) 2 TIMES DAILY
Qty: 1 EACH | Refills: 1 | Status: SHIPPED | OUTPATIENT
Start: 2021-07-22 | End: 2022-01-11

## 2021-07-22 NOTE — TELEPHONE ENCOUNTER
Spoke with pharmacist, Daniele De La Paz at [facility] 2367 regarding inhaler prescription for Mariea Sender is not covered by patient's insurance and will need an alternative inhaler. Per pharmacist, patient's insurance covers Symbicort Inhaler for a 20 dollar copay.  Info

## 2021-07-22 NOTE — TELEPHONE ENCOUNTER
Pharmacy: Med is not covered by insurance please change.     •  Fluticasone-Salmeterol (AIRDUO RESPICLICK 593/06) 542-78 MCG/ACT Inhalation Aerosol Powder, Breath Activated, Inhale 1 puff into the lungs 2 (two) times a day., Disp: 3 each, Rfl: 0

## 2021-07-22 NOTE — PROGRESS NOTES
Called patient patient stated she was unable to talk at the moment requested I call back later this afternoon after 1 pm. Noted. Reviewed patient chart.       Time Spent This Encounter Total: 5  min medical record review  Monthly Minute Total including today

## 2021-07-22 NOTE — PROGRESS NOTES
Called patient and left a message for patient to call back when they can. Reviewed patient chart.  Left contact my contact number [phone]        Time Spent This Encounter Total: 2 min medical record review  Monthly Minute Total including today: 7 minut

## 2021-08-19 ENCOUNTER — MED REC SCAN ONLY (OUTPATIENT)
Dept: INTERNAL MEDICINE CLINIC | Facility: CLINIC | Age: 71
End: 2021-08-19

## 2021-09-11 ENCOUNTER — OFFICE VISIT (OUTPATIENT)
Dept: INTERNAL MEDICINE CLINIC | Facility: CLINIC | Age: 71
End: 2021-09-11
Payer: MEDICARE

## 2021-09-11 VITALS
RESPIRATION RATE: 15 BRPM | HEIGHT: 58 IN | BODY MASS INDEX: 27.92 KG/M2 | HEART RATE: 85 BPM | DIASTOLIC BLOOD PRESSURE: 76 MMHG | SYSTOLIC BLOOD PRESSURE: 115 MMHG | WEIGHT: 133 LBS

## 2021-09-11 DIAGNOSIS — R10.32 LEFT GROIN PAIN: ICD-10-CM

## 2021-09-11 DIAGNOSIS — M79.605 LEFT LEG PAIN: Primary | ICD-10-CM

## 2021-09-11 PROCEDURE — 99213 OFFICE O/P EST LOW 20 MIN: CPT | Performed by: INTERNAL MEDICINE

## 2021-09-11 NOTE — PROGRESS NOTES
Jr Lu is a 73year old female.   Patient presents with:  Leg Pain: Left inner thigh       HPI:   URGENT VISIT   C/C LEFT LEG PAIN   C/o above x \"couple mns \"   No falls trauma or injury she points to the inner aspect of the left thigh  And it the lungs every 4 to 6 hours as needed for Wheezing.  18 g 0   • Levothyroxine Sodium 75 MCG Oral Tab Take 1 tablet (75 mcg total) by mouth every morning before breakfast. 90 tablet 1   • Meclizine HCl 25 MG Oral Tab Take 1 tablet (25 mg total) by mouth 3 ( Location: Right arm, Patient Position: Sitting, Cuff Size: adult)   Pulse 85   Resp 15   Ht 4' 10\" (1.473 m)   Wt 133 lb (60.3 kg)   LMP  (LMP Unknown)   BMI 27.80 kg/m²   GENERAL: well developed, well nourished,in no apparent distress  SKIN: no rashes,no

## 2021-09-14 ENCOUNTER — HOSPITAL ENCOUNTER (OUTPATIENT)
Dept: GENERAL RADIOLOGY | Facility: HOSPITAL | Age: 71
Discharge: HOME | End: 2021-09-14
Attending: INTERNAL MEDICINE
Payer: MEDICARE

## 2021-09-14 ENCOUNTER — ORDER TRANSCRIPTION (OUTPATIENT)
Dept: PHYSICAL THERAPY | Age: 71
End: 2021-09-14

## 2021-09-14 ENCOUNTER — TELEPHONE (OUTPATIENT)
Dept: INTERNAL MEDICINE CLINIC | Facility: CLINIC | Age: 71
End: 2021-09-14

## 2021-09-14 DIAGNOSIS — M47.819 ARTHRITIS, LOW BACK: Primary | ICD-10-CM

## 2021-09-14 DIAGNOSIS — M79.605 LEFT LEG PAIN: ICD-10-CM

## 2021-09-14 DIAGNOSIS — M25.552 LEFT HIP PAIN: Primary | ICD-10-CM

## 2021-09-14 DIAGNOSIS — R10.32 LEFT GROIN PAIN: ICD-10-CM

## 2021-09-14 PROCEDURE — 73502 X-RAY EXAM HIP UNI 2-3 VIEWS: CPT | Performed by: INTERNAL MEDICINE

## 2021-09-15 NOTE — TELEPHONE ENCOUNTER
I believe she can alternate with Tylenol as well and take NSAIDs only after meals  Agree with topical agents and also Aspercreme patches that she can buy over-the-counter  Ortho referral  had been placed, please ask her to make this appointment–she may chanel

## 2021-09-15 NOTE — TELEPHONE ENCOUNTER
Verified pt name and . Pt states she is taking Ibuprofen two tablets three times a day without relief. Pt states she has not yet tried any topical agents as advised. Also has not tried any heat or ice. Pt rates her pain 9, she is able to walk.  States sh

## 2021-09-15 NOTE — TELEPHONE ENCOUNTER
----- Message from Omar Kyle sent at 9/14/2021  3:46 PM CDT -----  Regarding: Question regarding X-Ray Hip w/wo Pelvis  I have scheduled my physical therapy appointment that will begin October.  @[address] [facility]. Can you prescribe pain medicine for m

## 2021-09-15 NOTE — TELEPHONE ENCOUNTER
Advised pt of providers information below. Pt verbalized understanding and no further questions or concerns. Dr Pedro Lawton, a referral to ortho was not placed, please see pended one for completion.

## 2021-09-29 ENCOUNTER — PATIENT OUTREACH (OUTPATIENT)
Dept: CASE MANAGEMENT | Age: 71
End: 2021-09-29

## 2021-09-29 NOTE — PROGRESS NOTES
Called patient and left a message for patient to call back when they can. Reviewed patient chart.  Left contact my contact number [phone]        Time Spent This Encounter Total: 5  min medical record review  Monthly Minute Total including today: 5 cornelia

## 2021-10-01 ENCOUNTER — OFFICE VISIT (OUTPATIENT)
Dept: ORTHOPEDICS CLINIC | Facility: CLINIC | Age: 71
End: 2021-10-01
Payer: MEDICARE

## 2021-10-01 VITALS — HEIGHT: 58 IN | WEIGHT: 130 LBS | BODY MASS INDEX: 27.29 KG/M2

## 2021-10-01 DIAGNOSIS — M25.552 LEFT HIP PAIN: ICD-10-CM

## 2021-10-01 DIAGNOSIS — M51.36 LUMBAR DEGENERATIVE DISC DISEASE: Primary | ICD-10-CM

## 2021-10-01 PROCEDURE — 99204 OFFICE O/P NEW MOD 45 MIN: CPT | Performed by: ORTHOPAEDIC SURGERY

## 2021-10-01 NOTE — PROGRESS NOTES
NURSING INTAKE COMMENTS: Patient presents with:  Back Pain: low back, arthritis on XR, daily pain      HPI: This 73year old female presents today with complaints of low back pain and left medial thigh and groin pain.   She has had symptoms for a number of (PROAIR HFA) 108 (90 Base) MCG/ACT Inhalation Aero Soln Inhale 2 puffs into the lungs every 6 (six) hours as needed for Wheezing. 1 each 0   • Fluticasone Propionate 50 MCG/ACT Nasal Suspension 2 sprays by Nasal route daily.  1 each 1   • Albuterol Sulfate lesions, open sores, rash  HEENT:denies recent vision change, new nasal congestion,hearing loss, tinnitus, sore throat, headaches  RESPIRATORY: denies new shortness of breath, cough, asthma, wheezing  CARDIOVASCULAR: denies chest pain, leg cramps with exer VIEWS, LEFT (CPT=73502)    Result Date: 9/14/2021  PROCEDURE: XR HIP W OR WO PELVIS 2 OR 3 VIEWS, LEFT (CPT=73502)  COMPARISON: None. INDICATIONS: Left groin pain and leg pain for several months. TECHNIQUE: 3 views were obtained.    FINDINGS:  BONES: Mild

## 2021-10-15 ENCOUNTER — HOSPITAL ENCOUNTER (OUTPATIENT)
Dept: MRI IMAGING | Age: 71
Discharge: HOME | End: 2021-10-15
Attending: ORTHOPAEDIC SURGERY
Payer: MEDICARE

## 2021-10-15 DIAGNOSIS — M25.552 LEFT HIP PAIN: ICD-10-CM

## 2021-10-15 PROCEDURE — 73721 MRI JNT OF LWR EXTRE W/O DYE: CPT | Performed by: ORTHOPAEDIC SURGERY

## 2021-10-19 DIAGNOSIS — E03.9 HYPOTHYROIDISM, UNSPECIFIED TYPE: ICD-10-CM

## 2021-10-19 RX ORDER — LEVOTHYROXINE SODIUM 0.07 MG/1
75 TABLET ORAL
Qty: 90 TABLET | Refills: 1 | Status: SHIPPED | OUTPATIENT
Start: 2021-10-19

## 2021-10-19 NOTE — TELEPHONE ENCOUNTER
Refilled per [facility] protocol.   Requested Prescriptions   Pending Prescriptions Disp Refills    LEVOTHYROXINE 75 MCG Oral Tab [Pharmacy Med Name: LEVOTHYROXINE 75 MCG TABLET] 90 tablet 1     Sig: TAKE 1 TABLET BY MOUTH EVERY MORNING BEFORE BREAKFAS [address] Medicare AB  Commercial    In 3 weeks Howard Lambert, [address] Medicare AB  Commercial    In 3 weeks Howard Lambert, [address] [place] [address]

## 2021-10-21 ENCOUNTER — OFFICE VISIT (OUTPATIENT)
Dept: PHYSICAL THERAPY | Age: 71
End: 2021-10-21
Attending: INTERNAL MEDICINE
Payer: MEDICARE

## 2021-10-21 DIAGNOSIS — M47.819 ARTHRITIS, LOW BACK: ICD-10-CM

## 2021-10-21 PROCEDURE — 97162 PT EVAL MOD COMPLEX 30 MIN: CPT

## 2021-10-21 PROCEDURE — 97110 THERAPEUTIC EXERCISES: CPT

## 2021-10-21 NOTE — PROGRESS NOTES
SPINE EVALUATION:   Referring Physician: Dr. Christiane Foy  Diagnosis: Arthritis, low back (M47.819)        Date of Service: 10/21/2021     PATIENT Olesya Mary is a 73year old y/o female who presents to therapy today with complaints of a chronic h (human immunodeficiency virus infection) ([facility] [address] [facility] HIV (human immunodeficiency virus infection) ([facility] Internal hemorrhoids (10/20/2017), Tinnitus, and Visual impairment.  She also has no past medical history of Anesthesia complication, Blood disorder, Deep ve IR  L hip: pain and guarding passive ER  L hip flex AROM: 90 deg*  L hip ER AROM: 50% loss*    Accessory motion: (continuing assessment of L/s); R hip posterior capsule tightness    Palpation: TTP medial knee, distal inner thigh (sartorius/gracilis)      S hip pain   · Pt will improve transversus abdominis recruitment to perform proper isometric contraction without requiring verbal or tactile cuing to promote advancement of therex   · Pt will demonstrate good understanding of proper posture and

## 2021-10-26 ENCOUNTER — OFFICE VISIT (OUTPATIENT)
Dept: PHYSICAL THERAPY | Age: 71
End: 2021-10-26
Attending: INTERNAL MEDICINE
Payer: MEDICARE

## 2021-10-26 DIAGNOSIS — M47.819 ARTHRITIS, LOW BACK: ICD-10-CM

## 2021-10-26 PROCEDURE — 97110 THERAPEUTIC EXERCISES: CPT

## 2021-10-26 PROCEDURE — 97140 MANUAL THERAPY 1/> REGIONS: CPT

## 2021-10-26 NOTE — PROGRESS NOTES
Dx: Arthritis, low back (M47.819)         Insurance (Authorized # of Visits):  Medicare Part A&B (10 POC)          Authorizing Physician: Dr. Alexis Telles  Next MD visit: TBD  Fall Risk: standard         Precautions: n/a             Subjective: Pt notes stretch TX#: 5/ Date:    Tx#: 6/   Ther ex:  Abdirizak stretch with belt 10 x 10 cts  Supine hip add with SB 10 x 5 cts  L hip fall out 2 x 10  Bridging 2x10   Wall SAG 10x             Manual:  HP inner L thigh x  5 min  STM hip adductors/flexors  medial knee

## 2021-10-28 ENCOUNTER — TELEPHONE (OUTPATIENT)
Dept: PHYSICAL THERAPY | Facility: HOSPITAL | Age: 71
End: 2021-10-28

## 2021-10-28 ENCOUNTER — APPOINTMENT (OUTPATIENT)
Dept: PHYSICAL THERAPY | Age: 71
End: 2021-10-28
Attending: INTERNAL MEDICINE
Payer: MEDICARE

## 2021-11-02 ENCOUNTER — OFFICE VISIT (OUTPATIENT)
Dept: PHYSICAL THERAPY | Age: 71
End: 2021-11-02
Attending: INTERNAL MEDICINE
Payer: MEDICARE

## 2021-11-02 DIAGNOSIS — M47.819 ARTHRITIS, LOW BACK: ICD-10-CM

## 2021-11-02 PROCEDURE — 97110 THERAPEUTIC EXERCISES: CPT

## 2021-11-02 PROCEDURE — 97140 MANUAL THERAPY 1/> REGIONS: CPT

## 2021-11-02 NOTE — PROGRESS NOTES
Dx: Arthritis, low back (M47.819)         Insurance (Authorized # of Visits):  Medicare Part A&B (10 POC)          Authorizing Physician: Dr. Alex Toribio MD visit: TBD  Fall Risk: standard         Precautions: n/a             Subjective: Less severe inte TX#: 3/10 Date:                 TX#: 4/ Date:                 TX#: 5/ Date:    Tx#: 6/   Ther ex:  Abdirizak stretch with belt 10 x 10 cts  Supine hip add with SB 10 x 5 cts  L hip fall out 2 x 10  Bridging 2x10   Wall SAG 10x       Ther ex:  SAQ (alonzo) 1

## 2021-11-04 ENCOUNTER — OFFICE VISIT (OUTPATIENT)
Dept: PHYSICAL THERAPY | Age: 71
End: 2021-11-04
Attending: INTERNAL MEDICINE
Payer: MEDICARE

## 2021-11-04 DIAGNOSIS — M47.819 ARTHRITIS, LOW BACK: ICD-10-CM

## 2021-11-04 PROCEDURE — 97140 MANUAL THERAPY 1/> REGIONS: CPT

## 2021-11-04 PROCEDURE — 97110 THERAPEUTIC EXERCISES: CPT

## 2021-11-04 NOTE — PROGRESS NOTES
Dx: Arthritis, low back (M47.819)         Insurance (Authorized # of Visits):  Medicare Part A&B (10 POC)          Authorizing Physician: Dr. Derick Toribio MD visit: TBD  Fall Risk: standard         Precautions: n/a             Subjective: Pt able to lift Hannah from Lab called with critical WBC 1.7 at 5.20AM. Critical lab result read back to Hannah.  Dr. Dia called, and notified.     TX#: 5/ Date:    Tx#: 6/   Ther ex:  Abdirizak stretch with belt 10 x 10 cts  Supine hip add with SB 10 x 5 cts  L hip fall out 2 x 10  Bridging 2x10   Wall SAG 10x       Ther ex:  SAQ (alonzo) 10 x 5 cts  SLR (with ER) 2 x 10  Hooklying hip ER with RTB 2 x

## 2021-11-09 ENCOUNTER — OFFICE VISIT (OUTPATIENT)
Dept: PHYSICAL THERAPY | Age: 71
End: 2021-11-09
Attending: INTERNAL MEDICINE
Payer: MEDICARE

## 2021-11-09 DIAGNOSIS — M47.819 ARTHRITIS, LOW BACK: ICD-10-CM

## 2021-11-09 PROCEDURE — 97140 MANUAL THERAPY 1/> REGIONS: CPT

## 2021-11-09 PROCEDURE — 97110 THERAPEUTIC EXERCISES: CPT

## 2021-11-10 NOTE — PROGRESS NOTES
Dx: Arthritis, low back (M47.819)         Insurance (Authorized # of Visits):  Medicare Part A&B (10 POC)          Authorizing Physician: Dr. Josesito Lipscomb  Next MD visit: TBD  Fall Risk: standard         Precautions: n/a             Subjective: Pt notes some L- comprehensive HEP to maintain progress achieved in PT       Plan: alleviate pain/symptoms; improve l/s mobility; hip mobility/ROM; flexibility, core/hip strength/stablity; posture; body mechanics     Date: 10/26/2021  TX#: 2/10 Date: 11/2/21

## 2021-11-11 ENCOUNTER — OFFICE VISIT (OUTPATIENT)
Dept: PHYSICAL THERAPY | Age: 71
End: 2021-11-11
Attending: INTERNAL MEDICINE
Payer: MEDICARE

## 2021-11-11 DIAGNOSIS — M47.819 ARTHRITIS, LOW BACK: ICD-10-CM

## 2021-11-11 PROCEDURE — 97110 THERAPEUTIC EXERCISES: CPT

## 2021-11-11 PROCEDURE — 97140 MANUAL THERAPY 1/> REGIONS: CPT

## 2021-11-12 NOTE — PROGRESS NOTES
Dx: Arthritis, low back (M47.819)         Insurance (Authorized # of Visits):  Medicare Part A&B (10 POC)          Authorizing Physician: Dr. Elliott Toribio MD visit: TBD  Fall Risk: standard         Precautions: n/a             Subjective: Pt reports her l strength/stablity; posture; body mechanics     Date: 10/26/2021  TX#: 2/10 Date: 11/2/21                 TX#: 3/10 Date: 11/4/21            TX#: 4/10 Date: 11/9/21           TX#: 5/10 Date: 11/11/21  Tx#: 6/10   Ther ex:  Juan Gilliland stretch with belt 10 x 10 c

## 2021-11-16 ENCOUNTER — APPOINTMENT (OUTPATIENT)
Dept: PHYSICAL THERAPY | Age: 71
End: 2021-11-16
Attending: INTERNAL MEDICINE
Payer: MEDICARE

## 2021-11-16 ENCOUNTER — TELEPHONE (OUTPATIENT)
Dept: PHYSICAL THERAPY | Facility: HOSPITAL | Age: 71
End: 2021-11-16

## 2021-11-18 ENCOUNTER — TELEPHONE (OUTPATIENT)
Dept: PHYSICAL THERAPY | Age: 71
End: 2021-11-18

## 2021-11-30 ENCOUNTER — PATIENT OUTREACH (OUTPATIENT)
Dept: CASE MANAGEMENT | Age: 71
End: 2021-11-30

## 2021-11-30 NOTE — PROGRESS NOTES
Called patient and left a message for patient to call back when they can. Reviewed patient chart. Multiple attempts to contact patient. Un en-enrollment letter sent via [employer].   Left my contact number [phone] if patient has further questions or would

## 2021-12-02 ENCOUNTER — OFFICE VISIT (OUTPATIENT)
Dept: PHYSICAL THERAPY | Age: 71
End: 2021-12-02
Attending: INTERNAL MEDICINE
Payer: MEDICARE

## 2021-12-02 PROCEDURE — 97110 THERAPEUTIC EXERCISES: CPT

## 2021-12-03 NOTE — PROGRESS NOTES
Dx: Arthritis, low back (M47.819)         Insurance (Authorized # of Visits):  Medicare Part A&B (10 POC)          Authorizing Physician: Dr. Toribio MD visit: TBD  Fall Risk: standard         Precautions: n/a             Subjective: Pt reports significant d body mechanics   Standing hip strength with theraband     Date: 10/26/2021  TX#: 2/10 Date: 11/2/21                 TX#: 3/10 Date: 11/4/21            TX#: 4/10 Date: 11/9/21           TX#: 5/10 Date: 11/11/21  Tx#: 6/10 Date: 12/2/21  Tx#: 7/10   Ther ex: wall SAG; hip stabilization; prone lying;  TrA training       Charges: 2TE; 1MM        Total Timed Treatment:  40 min  Total Treatment Time: 42 min

## 2021-12-16 ENCOUNTER — OFFICE VISIT (OUTPATIENT)
Dept: PHYSICAL THERAPY | Age: 71
End: 2021-12-16
Attending: INTERNAL MEDICINE
Payer: MEDICARE

## 2021-12-16 PROCEDURE — 97110 THERAPEUTIC EXERCISES: CPT

## 2021-12-16 PROCEDURE — 97140 MANUAL THERAPY 1/> REGIONS: CPT

## 2021-12-16 NOTE — PROGRESS NOTES
Dx: Arthritis, low back (M47.819)         Insurance (Authorized # of Visits):  Medicare Part A&B (10 POC)          Authorizing Physician: Dr. Amber Cali  Next MD visit: TBD  Fall Risk: standard         Precautions: n/a             Subjective: Pt notes most of Date: 12/16/21  Tx#: 8/10   Ther ex:  LTR 10x  SAQ (bolster) 10 x 5 cts  Prone lying x 3 min  ELVI x 3 min   Prone L quadriceps stretch belt 6 x 10 cts  TrA training with diaphragmatic breathing 10x                 Ther ex:  ITB stretch 3 x 20 cts   Pirifor

## 2021-12-27 ENCOUNTER — NURSE TRIAGE (OUTPATIENT)
Dept: INTERNAL MEDICINE CLINIC | Facility: CLINIC | Age: 71
End: 2021-12-27

## 2021-12-27 DIAGNOSIS — K21.9 GASTROESOPHAGEAL REFLUX DISEASE, UNSPECIFIED WHETHER ESOPHAGITIS PRESENT: Primary | ICD-10-CM

## 2021-12-28 ENCOUNTER — OFFICE VISIT (OUTPATIENT)
Dept: PHYSICAL THERAPY | Age: 71
End: 2021-12-28
Attending: INTERNAL MEDICINE
Payer: MEDICARE

## 2021-12-28 PROCEDURE — 97110 THERAPEUTIC EXERCISES: CPT

## 2021-12-28 NOTE — PROGRESS NOTES
DISCHARGE SUMMARY   Dx: Arthritis, low back (M47.819)         Insurance (Authorized # of Visits):  Medicare Part A&B (10 POC)          Authorizing Physician: Dr. Deidra Toribio MD visit: TBD  Fall Risk: standard         Precautions: n/a             Suraj Simental strength with theraband (HEP); side stepping with resistance     Date: 11/9/21           TX#: 5/10 Date: 11/11/21  Tx#: 6/10 Date: 12/2/21  Tx#: 7/10 Date: 12/16/21  Tx#: 8/10 Date: 12/28/21  Tx#: 9/10   Ther ex:  LTR 10x  SAQ (bolster) 10 x 5 cts  Prone l signed by therapist: Freeman Kelley, PT,DPT,OCS       Please co-sign or sign and return this letter via fax as soon as possible to [phone]. I certify the need for these services furnished under this plan of treatment and while under my care.     X

## 2021-12-28 NOTE — TELEPHONE ENCOUNTER
----- Message from Dmitry Chavez sent at 12/27/2021  1:47 PM CST -----  Regarding: Heart burn   Getting severe heartburn lately and when I am eating towards the end of swallow it hurts and burns.

## 2021-12-29 NOTE — TELEPHONE ENCOUNTER
Action Requested: Summary for Provider     []  Critical Lab, Recommendations Needed  [] Need Additional Advice  []   FYI    []   Need Orders  [] Need Medications Sent to Pharmacy  []  Other     SUMMARY: pt has a history of GERD.   States that the omperazole

## 2021-12-29 NOTE — TELEPHONE ENCOUNTER
Can try short term bid prn of the ppi but not indicated long term, will need to f/u gi   referal placed --new dr since dr Brenna Adams has retired/not here aymore

## 2022-01-04 ENCOUNTER — PATIENT MESSAGE (OUTPATIENT)
Dept: INTERNAL MEDICINE CLINIC | Facility: CLINIC | Age: 72
End: 2022-01-04

## 2022-01-04 NOTE — TELEPHONE ENCOUNTER
From: Bulmaro Channel  To:  Shannan Tolbert MD  Sent: 1/4/2022  7:01 AM CST  Subject: Sore throat     Would like to see a doctor about my sore throat and left ear Bilateral Helical Rim Advancement Flap Text: The defect edges were debeveled with a #15 blade scalpel.  Given the location of the defect and the proximity to free margins (helical rim) a bilateral helical rim advancement flap was deemed most appropriate.  Using a sterile surgical marker, the appropriate advancement flaps were drawn incorporating the defect and placing the expected incisions between the helical rim and antihelix where possible.  The area thus outlined was incised through and through with a #15 scalpel blade.  With a skin hook and iris scissors, the flaps were gently and sharply undermined and freed up.

## 2022-01-04 NOTE — TELEPHONE ENCOUNTER
From: Shin Ramsay  To:  Dixie Hardy MD  Sent: 1/4/2022 7:01 AM CST  Subject: Sore throat     Would like to see a doctor about my sore throat and left ear

## 2022-01-04 NOTE — TELEPHONE ENCOUNTER
Patient stated that has a constant cough- states has allergies. For the last 1 week her left ear has been hurting and her throat started hurting a few days ago. No fevers.  Patient does have alittle shortness of breath, but she does have asthma, using her i

## 2022-01-11 ENCOUNTER — OFFICE VISIT (OUTPATIENT)
Dept: ALLERGY | Facility: CLINIC | Age: 72
End: 2022-01-11
Payer: MEDICARE

## 2022-01-11 VITALS
HEART RATE: 82 BPM | OXYGEN SATURATION: 95 % | DIASTOLIC BLOOD PRESSURE: 87 MMHG | RESPIRATION RATE: 20 BRPM | SYSTOLIC BLOOD PRESSURE: 142 MMHG

## 2022-01-11 DIAGNOSIS — Z23 FLU VACCINE NEED: ICD-10-CM

## 2022-01-11 DIAGNOSIS — L30.9 DERMATITIS: Primary | ICD-10-CM

## 2022-01-11 DIAGNOSIS — Z92.29 COVID-19 VACCINE SERIES COMPLETED: ICD-10-CM

## 2022-01-11 DIAGNOSIS — J45.40 MODERATE PERSISTENT EXTRINSIC ASTHMA WITHOUT COMPLICATION: ICD-10-CM

## 2022-01-11 DIAGNOSIS — J30.81 ALLERGIC RHINITIS DUE TO ANIMAL DANDER: ICD-10-CM

## 2022-01-11 DIAGNOSIS — J30.89 ALLERGIC RHINITIS DUE TO DUST MITE: ICD-10-CM

## 2022-01-11 PROCEDURE — 99214 OFFICE O/P EST MOD 30 MIN: CPT | Performed by: ALLERGY & IMMUNOLOGY

## 2022-01-11 RX ORDER — PREDNISONE 20 MG/1
TABLET ORAL
Qty: 10 TABLET | Refills: 0 | Status: SHIPPED | OUTPATIENT
Start: 2022-01-11

## 2022-01-11 RX ORDER — BUDESONIDE AND FORMOTEROL FUMARATE DIHYDRATE 160; 4.5 UG/1; UG/1
2 AEROSOL RESPIRATORY (INHALATION) 2 TIMES DAILY
Qty: 3 EACH | Refills: 1 | Status: SHIPPED | OUTPATIENT
Start: 2022-01-11

## 2022-01-11 RX ORDER — LEVOCETIRIZINE DIHYDROCHLORIDE 5 MG/1
5 TABLET, FILM COATED ORAL EVERY EVENING
Qty: 90 TABLET | Refills: 1 | Status: SHIPPED | OUTPATIENT
Start: 2022-01-11

## 2022-01-11 RX ORDER — FLUTICASONE PROPIONATE 50 MCG
2 SPRAY, SUSPENSION (ML) NASAL DAILY
Qty: 3 EACH | Refills: 1 | Status: SHIPPED | OUTPATIENT
Start: 2022-01-11

## 2022-01-11 RX ORDER — ALBUTEROL SULFATE 90 UG/1
2 AEROSOL, METERED RESPIRATORY (INHALATION) EVERY 4 HOURS PRN
Qty: 1 EACH | Refills: 0 | Status: SHIPPED | OUTPATIENT
Start: 2022-01-11

## 2022-01-11 NOTE — PATIENT INSTRUCTIONS
#1 dermatitis  6-month history. Posterior neck and lower back. Occurs in spite of hydrocortisone 2.5%. Denies vesicles or blisters. Denies pain. Can be itchy in nature. Trial of triamcinolone twice a day.   Consider patch testing if not improving  Santy

## 2022-01-11 NOTE — PROGRESS NOTES
Tammy Bedoya is a 73year old female. HPI:   Patient presents with:  Rash: LOV 7/17/2021. Pt presents today with recurrent rash to the neck and shoulders for several months, comes and goes.   Pt has tried hydrocortisone cream for itching of rash b Disorder of thyroid     hypothyroidism   • History of blood transfusion 1993    [facility]   • HIV (human immunodeficiency virus infection) ([facility] [address]    • HIV (human immunodeficiency virus infection) ([facility]    • Internal hemorrhoids 10/20/2017   • Tinn lungs every 6 (six) hours as needed for Wheezing. 1 each 0   • Fluticasone Propionate 50 MCG/ACT Nasal Suspension 2 sprays by Nasal route daily.  1 each 1   • Albuterol Sulfate  (90 Base) MCG/ACT Inhalation Aero Soln Inhale 2 puffs into the lungs aline posterior neck and mid spine area of the lumbosacral area. No vesicles or blisters noted.   Extremities: no edema, cyanosis, or clubbing     ASSESSMENT/PLAN:   Assessment   Dermatitis  (primary encounter diagnosis)  Moderate persistent extrinsic asthma wit effects.  Teaching was provided via the teach back method

## 2022-01-12 ENCOUNTER — PATIENT MESSAGE (OUTPATIENT)
Dept: INTERNAL MEDICINE CLINIC | Facility: CLINIC | Age: 72
End: 2022-01-12

## 2022-01-12 NOTE — TELEPHONE ENCOUNTER
From: Juan Pablo Spence  To: Jeronimo Cm PA-C  Sent: 1/12/2022 5:04 AM CST  Subject: Check heart test     Where to go for that

## 2022-02-16 ENCOUNTER — TELEPHONE (OUTPATIENT)
Dept: GASTROENTEROLOGY | Facility: CLINIC | Age: 72
End: 2022-02-16

## 2022-02-16 ENCOUNTER — OFFICE VISIT (OUTPATIENT)
Dept: GASTROENTEROLOGY | Facility: CLINIC | Age: 72
End: 2022-02-16
Payer: MEDICARE

## 2022-02-16 VITALS
HEART RATE: 90 BPM | WEIGHT: 135 LBS | HEIGHT: 58 IN | SYSTOLIC BLOOD PRESSURE: 129 MMHG | DIASTOLIC BLOOD PRESSURE: 70 MMHG | BODY MASS INDEX: 28.34 KG/M2

## 2022-02-16 DIAGNOSIS — R13.10 DYSPHAGIA, UNSPECIFIED TYPE: ICD-10-CM

## 2022-02-16 DIAGNOSIS — K21.9 GASTROESOPHAGEAL REFLUX DISEASE, UNSPECIFIED WHETHER ESOPHAGITIS PRESENT: Primary | ICD-10-CM

## 2022-02-16 PROCEDURE — 99203 OFFICE O/P NEW LOW 30 MIN: CPT | Performed by: INTERNAL MEDICINE

## 2022-02-16 RX ORDER — OMEPRAZOLE 20 MG/1
20 CAPSULE, DELAYED RELEASE ORAL DAILY
COMMUNITY
End: 2022-02-28

## 2022-02-16 RX ORDER — MAGNESIUM HYDROXIDE/ALUMINUM HYDROXICE/SIMETHICONE 120; 1200; 1200 MG/30ML; MG/30ML; MG/30ML
15 SUSPENSION ORAL 4 TIMES DAILY PRN
COMMUNITY

## 2022-02-16 NOTE — PATIENT INSTRUCTIONS
GERD  Swallowing issues  - possible acid reflux - stay on omeprazole daily x 2 weeks  - EGD with MAC + possible dilation  - be carefull with the nasal spray ( steroid based and can cause fungal buildup)   - avoid spicy foods /see below    Colonoscopy due 2027 for screening    Acid reflux is a very common condition and in most cases can be controlled quite well by doing the little things right.   My recommedations are as outlined below.  -Avoid eating large meals/overeating  -No eating 3 hours before bed  -Avoid tight fitting close  -Avoid spicy and greasy foods  -Limit caffeine (coffee and pop) additionally try to limit carbonated beverages as these can lead to belching and increased reflux  -Take any antireflux medication such as omeprazole and/or famotidine as directed

## 2022-02-16 NOTE — TELEPHONE ENCOUNTER
Scheduled for:  Malgorzata Bear [address] with possible dilation  Provider Name:    Date:  2/24/2022  Location:  [facility]   Sedation:  Mac   Time:10:00 Am (pt is aware to arrive at 0900 Am )   Prep: Egd  Prep instructions were given to pt in the office, pt verbalized understanding. Meds/Allergies Reconciled?:  Physician reviewed     Diagnosis with codes: Lindia Carlos K21.9 , Dysphagia Unspecified type R13.10     Was patient informed to call insurance with codes (Y/N):  Yes, I confirmed Medicare and Commercial  insurance with the patient. The patient also verbally understands to call her insurance to check for pre-cert, codes were given on prep instructions. Referral sent?:  Yes   [facility] or [address] notified?:  I sent an electronic request to Endo Scheduling and received a confirmation today. Medication Orders: This patient verbally confirmed that  sheis not taking:   Iron, blood thinners, BP meds, and is not diabetic   Not latex allergy, Not PCN allergy and does not have a pacemaker Pt is aware to NOT take iron pills, herbal meds and diet supplements for 7 days before exam. Also to NOT take any form of alcohol, recreational drugs and any forms of ED meds 24 hours before exam.    Misc Orders:  Patient was informed that they will need a COVID 19 test prior to their procedure. Patient verbally understood & will await a phone call from [facility] to schedule.       Further instructions given by staff:

## 2022-02-21 ENCOUNTER — LAB ENCOUNTER (OUTPATIENT)
Dept: LAB | Facility: HOSPITAL | Age: 72
End: 2022-02-21
Attending: INTERNAL MEDICINE
Payer: MEDICARE

## 2022-02-21 DIAGNOSIS — Z01.818 PRE-OP TESTING: ICD-10-CM

## 2022-02-21 LAB — SARS-COV-2 RNA RESP QL NAA+PROBE: NOT DETECTED

## 2022-02-24 ENCOUNTER — ANESTHESIA (OUTPATIENT)
Dept: ENDOSCOPY | Facility: HOSPITAL | Age: 72
End: 2022-02-24
Payer: MEDICARE

## 2022-02-24 ENCOUNTER — HOSPITAL ENCOUNTER (OUTPATIENT)
Facility: HOSPITAL | Age: 72
Setting detail: HOSPITAL OUTPATIENT SURGERY
Discharge: HOME | End: 2022-02-24
Attending: INTERNAL MEDICINE | Admitting: INTERNAL MEDICINE
Payer: MEDICARE

## 2022-02-24 ENCOUNTER — ANESTHESIA EVENT (OUTPATIENT)
Dept: ENDOSCOPY | Facility: HOSPITAL | Age: 72
End: 2022-02-24
Payer: MEDICARE

## 2022-02-24 VITALS
DIASTOLIC BLOOD PRESSURE: 62 MMHG | RESPIRATION RATE: 17 BRPM | WEIGHT: 135 LBS | SYSTOLIC BLOOD PRESSURE: 100 MMHG | HEART RATE: 77 BPM | OXYGEN SATURATION: 97 % | TEMPERATURE: 99 F | HEIGHT: 58 IN | BODY MASS INDEX: 28.34 KG/M2

## 2022-02-24 DIAGNOSIS — R13.10 DYSPHAGIA, UNSPECIFIED TYPE: ICD-10-CM

## 2022-02-24 DIAGNOSIS — K21.9 GASTROESOPHAGEAL REFLUX DISEASE, UNSPECIFIED WHETHER ESOPHAGITIS PRESENT: ICD-10-CM

## 2022-02-24 DIAGNOSIS — Z01.818 PRE-OP TESTING: Primary | ICD-10-CM

## 2022-02-24 PROCEDURE — 0DB28ZX EXCISION OF MIDDLE ESOPHAGUS, VIA NATURAL OR ARTIFICIAL OPENING ENDOSCOPIC, DIAGNOSTIC: ICD-10-PCS | Performed by: INTERNAL MEDICINE

## 2022-02-24 PROCEDURE — 0DB68ZX EXCISION OF STOMACH, VIA NATURAL OR ARTIFICIAL OPENING ENDOSCOPIC, DIAGNOSTIC: ICD-10-PCS | Performed by: INTERNAL MEDICINE

## 2022-02-24 PROCEDURE — 0DB38ZX EXCISION OF LOWER ESOPHAGUS, VIA NATURAL OR ARTIFICIAL OPENING ENDOSCOPIC, DIAGNOSTIC: ICD-10-PCS | Performed by: INTERNAL MEDICINE

## 2022-02-24 PROCEDURE — 43239 EGD BIOPSY SINGLE/MULTIPLE: CPT | Performed by: INTERNAL MEDICINE

## 2022-02-24 PROCEDURE — 43249 ESOPH EGD DILATION <30 MM: CPT | Performed by: INTERNAL MEDICINE

## 2022-02-24 PROCEDURE — 0D738ZZ DILATION OF LOWER ESOPHAGUS, VIA NATURAL OR ARTIFICIAL OPENING ENDOSCOPIC: ICD-10-PCS | Performed by: INTERNAL MEDICINE

## 2022-02-24 RX ORDER — SODIUM CHLORIDE, SODIUM LACTATE, POTASSIUM CHLORIDE, CALCIUM CHLORIDE 600; 310; 30; 20 MG/100ML; MG/100ML; MG/100ML; MG/100ML
INJECTION, SOLUTION INTRAVENOUS CONTINUOUS
Status: DISCONTINUED | OUTPATIENT
Start: 2022-02-24 | End: 2022-02-24

## 2022-02-24 RX ORDER — SODIUM CHLORIDE, SODIUM LACTATE, POTASSIUM CHLORIDE, CALCIUM CHLORIDE 600; 310; 30; 20 MG/100ML; MG/100ML; MG/100ML; MG/100ML
INJECTION, SOLUTION INTRAVENOUS CONTINUOUS
OUTPATIENT
Start: 2022-02-24

## 2022-02-24 RX ORDER — NALOXONE HYDROCHLORIDE 0.4 MG/ML
80 INJECTION, SOLUTION INTRAMUSCULAR; INTRAVENOUS; SUBCUTANEOUS AS NEEDED
OUTPATIENT
Start: 2022-02-24 | End: 2022-02-24

## 2022-02-24 RX ORDER — LIDOCAINE HYDROCHLORIDE 20 MG/ML
INJECTION, SOLUTION EPIDURAL; INFILTRATION; INTRACAUDAL; PERINEURAL AS NEEDED
Status: DISCONTINUED | OUTPATIENT
Start: 2022-02-24 | End: 2022-02-24 | Stop reason: SURG

## 2022-02-24 RX ADMIN — SODIUM CHLORIDE, SODIUM LACTATE, POTASSIUM CHLORIDE, CALCIUM CHLORIDE: 600; 310; 30; 20 INJECTION, SOLUTION INTRAVENOUS at 10:14:00

## 2022-02-24 RX ADMIN — SODIUM CHLORIDE, SODIUM LACTATE, POTASSIUM CHLORIDE, CALCIUM CHLORIDE: 600; 310; 30; 20 INJECTION, SOLUTION INTRAVENOUS at 09:53:00

## 2022-02-24 RX ADMIN — LIDOCAINE HYDROCHLORIDE 40 MG: 20 INJECTION, SOLUTION EPIDURAL; INFILTRATION; INTRACAUDAL; PERINEURAL at 09:55:00

## 2022-02-24 NOTE — OPERATIVE REPORT
[facility] Report      Preoperative Diagnosis:  - GERD  - dysphagia       Postoperative Diagnosis:  - hiatal hernia 3 cm   - Schatzki's ring s/p TTS balloon dilation 15 mm  - gastritis       Procedure:    Esophagogastroduodenoscopy/ TTS balloon dilation 15 mm      Surgeon:  Comfort Bauer M.D. Anesthesia:  MAC sedation    Technique:  After informed consent, the patient was placed in the left lateral recumbent position. An Olympus adult HD gastroscope was inserted into the hypopharynx and advanced under direct vision into the esophagus, stomach and duodenum. The endoscope was withdrawn to the stomach where retroflexion of the annulus, body, cardia and fundus was performed. The scope was then positioned in the distal esophagus. A TTS balloon dilation catheter was advanced through the scope and centered over the Schatzki's ring. This was inflated to 15 mm and held for 30 seconds and then deflated. Examination of the area revealed no evidence of perforation, laceration or bleeding. The catheter was then removed    The endoscope was withdrawn. The procedures were well tolerated without immediate complication. Findings: The esophagus showed a Schatzki's ring at the GE junction, dilation and biopsied. Biopsies were taken from the midesophagus as well. The GE junction and diaphragmatic impression were at 34 centimeters and 37 cm for 3 cm sliding-type hiatal hernia. The stomach distended appropriately with insufflated air.   The mucosa of the stomach showed gastric erythema throughout the antrum and body of the stomach, biopsies taken  The duodenal bulb and post bulbar regions were normal.    Estimated blood loss-insignificant  Specimens-gastric biopsies, biopsies of distal and midesophagus      Impression:  - hiatal hernia 3 cm   - Schatzki's ring s/p TTS balloon dilation 15 mm  - gastritis     Recommendations:  - Post procedure /dilation instructions given  - Follow up on biopsy results  - Continue acid suppression            Bita Chen.  Erasmo Granger MD  2/24/2022  10:10 AM

## 2022-02-26 ENCOUNTER — PATIENT MESSAGE (OUTPATIENT)
Dept: GASTROENTEROLOGY | Facility: CLINIC | Age: 72
End: 2022-02-26

## 2022-02-28 RX ORDER — FAMOTIDINE 20 MG/1
20 TABLET, FILM COATED ORAL 2 TIMES DAILY
Qty: 60 TABLET | Refills: 3 | Status: SHIPPED | OUTPATIENT
Start: 2022-02-28

## 2022-02-28 NOTE — TELEPHONE ENCOUNTER
From: Adrián Ramírez  To: Haley Pineda MD  Sent: 2/26/2022 11:46 AM CST  Subject: Food    When can I go back to solid food.

## 2022-02-28 NOTE — TELEPHONE ENCOUNTER
Dr. Radha Mir    Patient had EGD with dilation on Thursday. Patient asking about diet. Since over 24 hours after procedure may she resume a regular diet or do you want her to stay on a soft diet?     Thank you

## 2022-03-01 NOTE — TELEPHONE ENCOUNTER
The patient contacted and results of her upper endoscopy with dilation reviewed. She is doing well after the procedure. I reviewed the biopsies of the stomach, Schatzki's ring and esophagus. She does have some gastritis. She likely has reflux relating to her hiatal hernia and Schatzki's ring. Plan  -Discussed dietary modifications and behavioral changes to decrease acid reflux events. -Trial of famotidine 1 tablet twice a day x3 months.  -Patient will call or follow-up with any further questions issues or problems.

## 2022-03-02 ENCOUNTER — PATIENT MESSAGE (OUTPATIENT)
Dept: GASTROENTEROLOGY | Facility: CLINIC | Age: 72
End: 2022-03-02

## 2022-03-02 NOTE — TELEPHONE ENCOUNTER
From: Varghese Merritt  To: Shelton Dennis. Jarrett Ruth MD  Sent: 2/26/2022 11:46 AM CST  Subject: Food    When can I go back to solid food.

## 2022-04-16 RX ORDER — LEVOTHYROXINE SODIUM 0.07 MG/1
75 TABLET ORAL
Qty: 90 TABLET | Refills: 0 | Status: SHIPPED | OUTPATIENT
Start: 2022-04-16

## 2022-04-16 NOTE — TELEPHONE ENCOUNTER
Refill passed per [employer] protocol. Requested Prescriptions   Pending Prescriptions Disp Refills    LEVOTHYROXINE 75 MCG Oral Tab [Pharmacy Med Name: LEVOTHYROXINE 75 MCG TABLET] 90 tablet 1     Sig: TAKE 1 TABLET BY MOUTH BEFORE BREAKFAST.         Thyroid Medication Protocol Passed - 4/16/2022  2:56 PM        Passed - TSH in past 12 months        Passed - Last TSH value is normal     Lab Results   Component Value Date    TSH 2.720 04/30/2021                 Passed - Appointment in past 12 or next 3 months              Recent Outpatient Visits              1 month ago Gastroesophageal reflux disease, unspecified whether esophagitis present    [facility], [address], Parisa Judd MD    Office Visit    2 months ago HIV infection, unspecified symptom status [facility])    Infectious Disease - [address], [place], Regine DANG DO    Telemedicine    3 months ago Dermatitis    [place] [facility] [place], [facility], [address] [facility], Beatrice Healy MD    Office Visit    3 months ago     [facility]    Office Visit    4 months ago     [facility]    Office Visit

## 2022-04-18 RX ORDER — ALBUTEROL SULFATE 90 UG/1
AEROSOL, METERED RESPIRATORY (INHALATION)
Qty: 6.7 EACH | Refills: 0 | Status: SHIPPED | OUTPATIENT
Start: 2022-04-18

## 2022-04-18 NOTE — TELEPHONE ENCOUNTER
Patient contacted, informed that Xyzal 5 mg should be taken nightly prior to bed. Patient informed refill for below meds were sent to her pharmacy. Patient repeated back information and verbalized understanding.

## 2022-05-10 ENCOUNTER — LAB ENCOUNTER (OUTPATIENT)
Dept: LAB | Age: 72
End: 2022-05-10
Attending: INTERNAL MEDICINE
Payer: MEDICARE

## 2022-05-10 DIAGNOSIS — Z00.00 UNSPECIFIED EXAMINATION: Primary | ICD-10-CM

## 2022-05-10 DIAGNOSIS — B20 HIV INFECTION, UNSPECIFIED SYMPTOM STATUS (HCC): ICD-10-CM

## 2022-05-10 LAB
ALBUMIN SERPL-MCNC: 3.7 G/DL (ref 3.4–5)
ALBUMIN/GLOB SERPL: 1 {RATIO} (ref 1–2)
ALP LIVER SERPL-CCNC: 115 U/L
ALT SERPL-CCNC: 22 U/L
ANION GAP SERPL CALC-SCNC: 3 MMOL/L (ref 0–18)
AST SERPL-CCNC: 15 U/L (ref 15–37)
BASOPHILS # BLD AUTO: 0.03 X10(3) UL (ref 0–0.2)
BASOPHILS NFR BLD AUTO: 0.6 %
BILIRUB SERPL-MCNC: 0.5 MG/DL (ref 0.1–2)
BUN BLD-MCNC: 14 MG/DL (ref 7–18)
BUN/CREAT SERPL: 20.6 (ref 10–20)
CALCIUM BLD-MCNC: 9.5 MG/DL (ref 8.5–10.1)
CHLORIDE SERPL-SCNC: 111 MMOL/L (ref 98–112)
CO2 SERPL-SCNC: 30 MMOL/L (ref 21–32)
CREAT BLD-MCNC: 0.68 MG/DL
DEPRECATED RDW RBC AUTO: 40.9 FL (ref 35.1–46.3)
EOSINOPHIL # BLD AUTO: 0.03 X10(3) UL (ref 0–0.7)
EOSINOPHIL NFR BLD AUTO: 0.6 %
ERYTHROCYTE [DISTWIDTH] IN BLOOD BY AUTOMATED COUNT: 12.6 % (ref 11–15)
FASTING STATUS PATIENT QL REPORTED: NO
GLOBULIN PLAS-MCNC: 3.6 G/DL (ref 2.8–4.4)
GLUCOSE BLD-MCNC: 103 MG/DL (ref 70–99)
HCT VFR BLD AUTO: 41.7 %
HGB BLD-MCNC: 13.7 G/DL
IMM GRANULOCYTES # BLD AUTO: 0.01 X10(3) UL (ref 0–1)
IMM GRANULOCYTES NFR BLD: 0.2 %
LYMPHOCYTES # BLD AUTO: 1.26 X10(3) UL (ref 1–4)
LYMPHOCYTES NFR BLD AUTO: 26.4 %
MCH RBC QN AUTO: 29.1 PG (ref 26–34)
MCHC RBC AUTO-ENTMCNC: 32.9 G/DL (ref 31–37)
MCV RBC AUTO: 88.5 FL
MONOCYTES # BLD AUTO: 0.5 X10(3) UL (ref 0.1–1)
MONOCYTES NFR BLD AUTO: 10.5 %
NEUTROPHILS # BLD AUTO: 2.94 X10 (3) UL (ref 1.5–7.7)
NEUTROPHILS # BLD AUTO: 2.94 X10(3) UL (ref 1.5–7.7)
NEUTROPHILS NFR BLD AUTO: 61.7 %
OSMOLALITY SERPL CALC.SUM OF ELEC: 299 MOSM/KG (ref 275–295)
PLATELET # BLD AUTO: 207 10(3)UL (ref 150–450)
POTASSIUM SERPL-SCNC: 4.6 MMOL/L (ref 3.5–5.1)
PROT SERPL-MCNC: 7.3 G/DL (ref 6.4–8.2)
RBC # BLD AUTO: 4.71 X10(6)UL
SODIUM SERPL-SCNC: 144 MMOL/L (ref 136–145)
WBC # BLD AUTO: 4.8 X10(3) UL (ref 4–11)

## 2022-05-10 PROCEDURE — 87591 N.GONORRHOEAE DNA AMP PROB: CPT

## 2022-05-10 PROCEDURE — 85025 COMPLETE CBC W/AUTO DIFF WBC: CPT

## 2022-05-10 PROCEDURE — 87536 HIV-1 QUANT&REVRSE TRNSCRPJ: CPT

## 2022-05-10 PROCEDURE — 80053 COMPREHEN METABOLIC PANEL: CPT

## 2022-05-10 PROCEDURE — 87491 CHLMYD TRACH DNA AMP PROBE: CPT

## 2022-05-10 PROCEDURE — 36415 COLL VENOUS BLD VENIPUNCTURE: CPT

## 2022-05-10 PROCEDURE — 86360 T CELL ABSOLUTE COUNT/RATIO: CPT

## 2022-05-10 PROCEDURE — 86780 TREPONEMA PALLIDUM: CPT

## 2022-05-11 LAB
C TRACH DNA SPEC QL NAA+PROBE: NEGATIVE
CD3+CD4+ CELLS # BLD: 225 /MM3
CD3+CD4+ CELLS NFR BLD: 18 %
CD3+CD4+ CELLS/CD3+CD8+ CLL SPEC: 0.3
CD3+CD8+ CELLS NFR SPEC: 60 %
N GONORRHOEA DNA SPEC QL NAA+PROBE: NEGATIVE
T PALLIDUM AB SER QL: NEGATIVE

## 2022-05-12 LAB
HIV-1 QNT BY NAAT (LOG COPIES/ML): <1.47 LOG CPY/ML
HIV-1 QNT BY NAAT INTERP: DETECTED

## 2022-07-25 DIAGNOSIS — E03.9 HYPOTHYROIDISM, UNSPECIFIED TYPE: ICD-10-CM

## 2022-07-25 RX ORDER — LEVOTHYROXINE SODIUM 0.07 MG/1
TABLET ORAL
Qty: 90 TABLET | Refills: 0 | Status: SHIPPED | OUTPATIENT
Start: 2022-07-25

## 2022-09-15 RX ORDER — ALBUTEROL SULFATE 90 UG/1
AEROSOL, METERED RESPIRATORY (INHALATION)
Qty: 18 EACH | Refills: 0 | OUTPATIENT
Start: 2022-09-15

## 2022-10-12 ENCOUNTER — OFFICE VISIT (OUTPATIENT)
Dept: FAMILY MEDICINE CLINIC | Facility: CLINIC | Age: 72
End: 2022-10-12
Payer: MEDICARE

## 2022-10-12 VITALS
OXYGEN SATURATION: 99 % | HEART RATE: 81 BPM | SYSTOLIC BLOOD PRESSURE: 148 MMHG | TEMPERATURE: 98 F | RESPIRATION RATE: 20 BRPM | DIASTOLIC BLOOD PRESSURE: 78 MMHG | WEIGHT: 139.38 LBS | BODY MASS INDEX: 29.26 KG/M2 | HEIGHT: 58 IN

## 2022-10-12 DIAGNOSIS — J02.9 PHARYNGITIS, UNSPECIFIED ETIOLOGY: ICD-10-CM

## 2022-10-12 DIAGNOSIS — B37.0 THRUSH, ORAL: Primary | ICD-10-CM

## 2022-10-12 DIAGNOSIS — Z23 NEED FOR INFLUENZA VACCINATION: ICD-10-CM

## 2022-10-12 LAB
CONTROL LINE PRESENT WITH A CLEAR BACKGROUND (YES/NO): YES YES/NO
KIT LOT #: NORMAL NUMERIC
STREP GRP A CUL-SCR: NEGATIVE

## 2022-10-12 PROCEDURE — 90662 IIV NO PRSV INCREASED AG IM: CPT | Performed by: NURSE PRACTITIONER

## 2022-10-12 PROCEDURE — 99213 OFFICE O/P EST LOW 20 MIN: CPT | Performed by: NURSE PRACTITIONER

## 2022-10-12 PROCEDURE — G0008 ADMIN INFLUENZA VIRUS VAC: HCPCS | Performed by: NURSE PRACTITIONER

## 2022-10-12 PROCEDURE — 87081 CULTURE SCREEN ONLY: CPT | Performed by: NURSE PRACTITIONER

## 2022-10-12 PROCEDURE — 87880 STREP A ASSAY W/OPTIC: CPT | Performed by: NURSE PRACTITIONER

## 2022-10-12 RX ORDER — DARUNAVIR ETHANOLATE AND COBICISTAT 800; 150 MG/1; MG/1
1 TABLET, FILM COATED ORAL DAILY
COMMUNITY
Start: 2022-09-22 | End: 2023-01-05

## 2022-10-12 RX ORDER — EMTRICITABINE AND TENOFOVIR ALAFENAMIDE 200; 25 MG/1; MG/1
1 TABLET ORAL DAILY
COMMUNITY
Start: 2022-09-22 | End: 2023-01-05

## 2022-10-13 LAB — SARS-COV-2 RNA RESP QL NAA+PROBE: NOT DETECTED

## 2022-10-19 ENCOUNTER — OFFICE VISIT (OUTPATIENT)
Dept: INTERNAL MEDICINE CLINIC | Facility: CLINIC | Age: 72
End: 2022-10-19
Payer: MEDICARE

## 2022-10-19 VITALS
WEIGHT: 138 LBS | HEART RATE: 86 BPM | RESPIRATION RATE: 18 BRPM | DIASTOLIC BLOOD PRESSURE: 77 MMHG | HEIGHT: 58 IN | SYSTOLIC BLOOD PRESSURE: 113 MMHG | BODY MASS INDEX: 28.97 KG/M2

## 2022-10-19 DIAGNOSIS — E03.9 HYPOTHYROIDISM, UNSPECIFIED TYPE: ICD-10-CM

## 2022-10-19 DIAGNOSIS — I77.1 TORTUOUS AORTA (HCC): ICD-10-CM

## 2022-10-19 DIAGNOSIS — B20 HUMAN IMMUNODEFICIENCY VIRUS (HIV) DISEASE (HCC): ICD-10-CM

## 2022-10-19 DIAGNOSIS — Z00.00 ENCOUNTER FOR ANNUAL HEALTH EXAMINATION: Primary | ICD-10-CM

## 2022-10-19 DIAGNOSIS — K64.8 INTERNAL HEMORRHOIDS: ICD-10-CM

## 2022-10-19 DIAGNOSIS — F32.A ANXIETY AND DEPRESSION: ICD-10-CM

## 2022-10-19 DIAGNOSIS — Z12.31 ENCOUNTER FOR SCREENING MAMMOGRAM FOR MALIGNANT NEOPLASM OF BREAST: ICD-10-CM

## 2022-10-19 DIAGNOSIS — F41.9 ANXIETY AND DEPRESSION: ICD-10-CM

## 2022-10-19 PROCEDURE — 1126F AMNT PAIN NOTED NONE PRSNT: CPT | Performed by: INTERNAL MEDICINE

## 2022-10-19 PROCEDURE — G0439 PPPS, SUBSEQ VISIT: HCPCS | Performed by: INTERNAL MEDICINE

## 2022-10-26 DIAGNOSIS — E03.9 HYPOTHYROIDISM, UNSPECIFIED TYPE: ICD-10-CM

## 2022-10-27 RX ORDER — LEVOTHYROXINE SODIUM 0.07 MG/1
TABLET ORAL
Qty: 90 TABLET | Refills: 0 | Status: SHIPPED | OUTPATIENT
Start: 2022-10-27

## 2022-10-31 NOTE — PLAN OF CARE
Problem: Patient Centered Care  Goal: Patient preferences are identified and integrated in the patient's plan of care  Description  Interventions:  - What would you like us to know as we care for you?   - Provide timely, complete, and accurate informatio activity  Description  INTERVENTIONS:  - Maintain airway, patient safety  and administer oxygen as ordered  - Monitor patient for seizure activity, document and report duration and description of seizure to MD/LIP  - If seizure occurs, turn patient to side bleeding and/or hematoma  - Assess quality of pulses, skin color and temperature  - Assess for signs of decreased coronary artery perfusion - ex.  Angina  - Evaluate fluid balance, assess for edema, trend weights  Outcome: Progressing  Goal: Absence of card Denies Denies

## 2022-12-27 ENCOUNTER — OFFICE VISIT (OUTPATIENT)
Dept: FAMILY MEDICINE CLINIC | Facility: CLINIC | Age: 72
End: 2022-12-27
Payer: MEDICARE

## 2022-12-27 VITALS
HEART RATE: 89 BPM | HEIGHT: 58 IN | OXYGEN SATURATION: 95 % | BODY MASS INDEX: 28.13 KG/M2 | RESPIRATION RATE: 18 BRPM | DIASTOLIC BLOOD PRESSURE: 62 MMHG | SYSTOLIC BLOOD PRESSURE: 118 MMHG | TEMPERATURE: 101 F | WEIGHT: 134 LBS

## 2022-12-27 DIAGNOSIS — U07.1 COVID-19 VIRUS INFECTION: Primary | ICD-10-CM

## 2022-12-27 LAB
OPERATOR ID: ABNORMAL
POCT LOT NUMBER: ABNORMAL
RAPID SARS-COV-2 BY PCR: DETECTED

## 2022-12-27 PROCEDURE — U0002 COVID-19 LAB TEST NON-CDC: HCPCS | Performed by: NURSE PRACTITIONER

## 2022-12-27 PROCEDURE — 99213 OFFICE O/P EST LOW 20 MIN: CPT | Performed by: NURSE PRACTITIONER

## 2022-12-27 RX ORDER — CLOTRIMAZOLE 1 %
CREAM (GRAM) TOPICAL
COMMUNITY
Start: 2022-12-05

## 2022-12-27 RX ORDER — BENZONATATE 200 MG/1
200 CAPSULE ORAL 3 TIMES DAILY PRN
Qty: 30 CAPSULE | Refills: 0 | Status: SHIPPED | OUTPATIENT
Start: 2022-12-27

## 2022-12-27 RX ORDER — MOMETASONE FUROATE 1 MG/G
1 CREAM TOPICAL
COMMUNITY
Start: 2022-12-05

## 2023-01-09 RX ORDER — BUDESONIDE AND FORMOTEROL FUMARATE DIHYDRATE 160; 4.5 UG/1; UG/1
2 AEROSOL RESPIRATORY (INHALATION) 2 TIMES DAILY
Qty: 3 EACH | Refills: 0 | Status: SHIPPED | OUTPATIENT
Start: 2023-01-09

## 2023-01-09 RX ORDER — ALBUTEROL SULFATE 90 UG/1
2 AEROSOL, METERED RESPIRATORY (INHALATION)
Qty: 18 G | Refills: 0 | Status: SHIPPED | OUTPATIENT
Start: 2023-01-09

## 2023-01-09 NOTE — TELEPHONE ENCOUNTER
Message left on patient's voicemail informing her that Dr. Haily Subramanian sent refill of Albuterol to her pharmacy.

## 2023-01-13 DIAGNOSIS — E03.9 HYPOTHYROIDISM, UNSPECIFIED TYPE: ICD-10-CM

## 2023-01-14 RX ORDER — LEVOTHYROXINE SODIUM 0.07 MG/1
75 TABLET ORAL
Qty: 90 TABLET | Refills: 1 | Status: SHIPPED | OUTPATIENT
Start: 2023-01-14

## 2023-01-14 NOTE — TELEPHONE ENCOUNTER
Protocol failed or has No Protocol, please review  Requested Prescriptions   Pending Prescriptions Disp Refills    LEVOTHYROXINE 75 MCG Oral Tab [Pharmacy Med Name: LEVOTHYROXINE 75 MCG TABLET] 90 tablet 0     Sig: TAKE 1 TABLET BY MOUTH EVERY DAY BEFORE BREAKFAST       Thyroid Medication Protocol Failed - 1/13/2023  1:09 PM        Failed - TSH in past 12 months        Passed - Last TSH value is normal     Lab Results   Component Value Date    TSH 2.720 04/30/2021                 Passed - In person appointment or virtual visit in the past 12 mos or appointment in next 3 mos     Recent Outpatient Visits              2 weeks ago COVID-19 virus infection    [address], [facility], [address]    Office Visit    2 months ago Encounter for annual health examination    [facility] [place], Leanne MADRID MD    Office Visit    3 months ago [place] [facility] DANE Blackwell    Office Visit    11 months ago Gastroesophageal reflux disease, unspecified whether esophagitis present    [place], [employer], [place], Georgi Pablo MD    Office Visit    11 months ago HIV infection, unspecified symptom status [facility])    Infectious Disease - Blood, [place] rash[place], Kathy Engadine, DO    Telemedicine          Future Appointments         Provider Department Appt Notes    In 1 week [facility] No    In 1 week [facility] None    In 1 week Morris Cary MD [facility] [place], [address] Allergy    In 3 months [address] None                  Future Appointments         Provider Department Appt Notes    In 1 week [address] No    In 1 week [address] None    In 1 week Amado Domínguez MD [facility], [facility], [address] [facility] Allergy    In 3 months [facility] [address] [facility] None          Recent Outpatient Visits              2 weeks ago COVID-19 virus infection    [address], [facility], Count includes the [facility]    Office Visit    2 months ago Encounter for annual health examination    [facility], [facility], [address] Sharma MD    Office Visit    3 months ago Janene Lopez, oral    [place] DANE Yip    Office Visit    11 months ago Gastroesophageal reflux disease, unspecified whether esophagitis present    [facility], [facility], [address], Chery Pablo MD    Office Visit    11 months ago HIV infection, unspecified symptom status [facility])    Infectious Disease - [address], [place], [place], [place]    Telemedicine

## 2023-01-23 ENCOUNTER — LAB ENCOUNTER (OUTPATIENT)
Dept: LAB | Age: 73
End: 2023-01-23
Attending: INTERNAL MEDICINE
Payer: MEDICARE

## 2023-01-23 ENCOUNTER — PATIENT MESSAGE (OUTPATIENT)
Dept: INTERNAL MEDICINE CLINIC | Facility: CLINIC | Age: 73
End: 2023-01-23

## 2023-01-23 ENCOUNTER — EKG ENCOUNTER (OUTPATIENT)
Dept: LAB | Age: 73
End: 2023-01-23
Attending: INTERNAL MEDICINE
Payer: MEDICARE

## 2023-01-23 ENCOUNTER — OFFICE VISIT (OUTPATIENT)
Dept: ALLERGY | Facility: CLINIC | Age: 73
End: 2023-01-23

## 2023-01-23 VITALS
DIASTOLIC BLOOD PRESSURE: 87 MMHG | SYSTOLIC BLOOD PRESSURE: 142 MMHG | BODY MASS INDEX: 28.76 KG/M2 | HEIGHT: 58 IN | WEIGHT: 137 LBS | HEART RATE: 78 BPM | OXYGEN SATURATION: 96 %

## 2023-01-23 DIAGNOSIS — Z23 FLU VACCINE NEED: ICD-10-CM

## 2023-01-23 DIAGNOSIS — J45.40 MODERATE PERSISTENT EXTRINSIC ASTHMA WITHOUT COMPLICATION: Primary | ICD-10-CM

## 2023-01-23 DIAGNOSIS — B20 HUMAN IMMUNODEFICIENCY VIRUS (HIV) DISEASE (HCC): ICD-10-CM

## 2023-01-23 DIAGNOSIS — J30.81 ALLERGIC RHINITIS DUE TO ANIMAL DANDER: ICD-10-CM

## 2023-01-23 DIAGNOSIS — E03.9 HYPOTHYROIDISM, UNSPECIFIED TYPE: ICD-10-CM

## 2023-01-23 DIAGNOSIS — Z00.00 ENCOUNTER FOR ANNUAL HEALTH EXAMINATION: ICD-10-CM

## 2023-01-23 DIAGNOSIS — H69.82 DYSFUNCTION OF LEFT EUSTACHIAN TUBE: ICD-10-CM

## 2023-01-23 DIAGNOSIS — J30.89 ALLERGIC RHINITIS DUE TO DUST MITE: ICD-10-CM

## 2023-01-23 DIAGNOSIS — Z92.29 COVID-19 VACCINE SERIES COMPLETED: ICD-10-CM

## 2023-01-23 DIAGNOSIS — F32.A ANXIETY AND DEPRESSION: ICD-10-CM

## 2023-01-23 DIAGNOSIS — F41.9 ANXIETY AND DEPRESSION: ICD-10-CM

## 2023-01-23 LAB
ALBUMIN SERPL-MCNC: 3.8 G/DL (ref 3.4–5)
ALBUMIN/GLOB SERPL: 1.1 {RATIO} (ref 1–2)
ALP LIVER SERPL-CCNC: 91 U/L
ALT SERPL-CCNC: 20 U/L
ANION GAP SERPL CALC-SCNC: 7 MMOL/L (ref 0–18)
AST SERPL-CCNC: 13 U/L (ref 15–37)
BILIRUB SERPL-MCNC: 0.3 MG/DL (ref 0.1–2)
BUN BLD-MCNC: 12 MG/DL (ref 7–18)
BUN/CREAT SERPL: 19.4 (ref 10–20)
CALCIUM BLD-MCNC: 9.2 MG/DL (ref 8.5–10.1)
CHLORIDE SERPL-SCNC: 109 MMOL/L (ref 98–112)
CHOLEST SERPL-MCNC: 220 MG/DL (ref ?–200)
CO2 SERPL-SCNC: 26 MMOL/L (ref 21–32)
CREAT BLD-MCNC: 0.62 MG/DL
DEPRECATED RDW RBC AUTO: 39.8 FL (ref 35.1–46.3)
ERYTHROCYTE [DISTWIDTH] IN BLOOD BY AUTOMATED COUNT: 12.2 % (ref 11–15)
FASTING PATIENT LIPID ANSWER: YES
FASTING STATUS PATIENT QL REPORTED: YES
GFR SERPLBLD BASED ON 1.73 SQ M-ARVRAT: 96 ML/MIN/1.73M2 (ref 60–?)
GLOBULIN PLAS-MCNC: 3.6 G/DL (ref 2.8–4.4)
GLUCOSE BLD-MCNC: 87 MG/DL (ref 70–99)
HCT VFR BLD AUTO: 41.2 %
HDLC SERPL-MCNC: 56 MG/DL (ref 40–59)
HGB BLD-MCNC: 13.6 G/DL
LDLC SERPL CALC-MCNC: 122 MG/DL (ref ?–100)
MCH RBC QN AUTO: 29.4 PG (ref 26–34)
MCHC RBC AUTO-ENTMCNC: 33 G/DL (ref 31–37)
MCV RBC AUTO: 89.2 FL
NONHDLC SERPL-MCNC: 164 MG/DL (ref ?–130)
OSMOLALITY SERPL CALC.SUM OF ELEC: 293 MOSM/KG (ref 275–295)
PLATELET # BLD AUTO: 186 10(3)UL (ref 150–450)
POTASSIUM SERPL-SCNC: 3.9 MMOL/L (ref 3.5–5.1)
PROT SERPL-MCNC: 7.4 G/DL (ref 6.4–8.2)
RBC # BLD AUTO: 4.62 X10(6)UL
SODIUM SERPL-SCNC: 142 MMOL/L (ref 136–145)
TRIGL SERPL-MCNC: 240 MG/DL (ref 30–149)
TSI SER-ACNC: 1.05 MIU/ML (ref 0.36–3.74)
VLDLC SERPL CALC-MCNC: 43 MG/DL (ref 0–30)
WBC # BLD AUTO: 5.3 X10(3) UL (ref 4–11)

## 2023-01-23 PROCEDURE — 99214 OFFICE O/P EST MOD 30 MIN: CPT | Performed by: ALLERGY & IMMUNOLOGY

## 2023-01-23 PROCEDURE — 80061 LIPID PANEL: CPT

## 2023-01-23 PROCEDURE — 1126F AMNT PAIN NOTED NONE PRSNT: CPT | Performed by: ALLERGY & IMMUNOLOGY

## 2023-01-23 PROCEDURE — 80053 COMPREHEN METABOLIC PANEL: CPT

## 2023-01-23 PROCEDURE — 84443 ASSAY THYROID STIM HORMONE: CPT

## 2023-01-23 PROCEDURE — 85027 COMPLETE CBC AUTOMATED: CPT

## 2023-01-23 PROCEDURE — 36415 COLL VENOUS BLD VENIPUNCTURE: CPT

## 2023-01-23 RX ORDER — AZELASTINE 1 MG/ML
2 SPRAY, METERED NASAL DAILY
Qty: 1 EACH | Refills: 2 | Status: SHIPPED | OUTPATIENT
Start: 2023-01-23

## 2023-01-23 NOTE — PATIENT INSTRUCTIONS
#1 asthma  No ED visits or prednisone in the interim. Denies symptoms more than 2 days/week with current air duo. Continue with air duo  Albuterol 2 puffs every 4-6 hours if having active coughing wheezing or shortness of breath  Reviewed signs and symptoms of persistent asthma including the rules of 2    #2 allergic rhinitis  Continue with Xyzal and Flonase. Add trial of Astelin 2 sprays per nostril twice a day as an antihistamine nasal spray  Reviewed avoidance measures and potential treatment option immunotherapy    #3 flu vaccine up-to-date  4. COVID vaccinations up-to-date    5. HIV  Denies recurrent infections in the interim. Viral load undetectable per patient report. Medications through ID.     #6 eustachian tube dysfunction  Trial of Astelin nasal spray along with her current Flonase and Xyzal.  May consider trial of pseudoephedrine 30 mg every 6 hours or 120 mg every 12 hours if not improving  Lastly may consider Medrol Dosepak if not improving with above recommendations

## 2023-01-24 DIAGNOSIS — E78.2 MIXED HYPERLIPIDEMIA: Primary | ICD-10-CM

## 2023-01-24 NOTE — TELEPHONE ENCOUNTER
Dr. Jas Mercer for when you review Lipid Panel results. Pt says she was not fasting     No need to respond to this encounter. May include response under Test Results. Thanks! But also completed CMP at the same time.

## 2023-01-25 NOTE — TELEPHONE ENCOUNTER
I would have told the pt at the visit with me if I had ordered it then to get it done fasting   Will send result note

## 2023-03-17 ENCOUNTER — OFFICE VISIT (OUTPATIENT)
Dept: ORTHOPEDICS CLINIC | Facility: CLINIC | Age: 73
End: 2023-03-17

## 2023-03-17 ENCOUNTER — HOSPITAL ENCOUNTER (OUTPATIENT)
Dept: GENERAL RADIOLOGY | Facility: HOSPITAL | Age: 73
Discharge: HOME | End: 2023-03-17
Attending: ORTHOPAEDIC SURGERY
Payer: MEDICARE

## 2023-03-17 DIAGNOSIS — M22.42 PATELLOFEMORAL CHONDROSIS OF LEFT KNEE: ICD-10-CM

## 2023-03-17 DIAGNOSIS — M25.562 PAIN IN BOTH KNEES, UNSPECIFIED CHRONICITY: ICD-10-CM

## 2023-03-17 DIAGNOSIS — M25.561 PAIN IN BOTH KNEES, UNSPECIFIED CHRONICITY: ICD-10-CM

## 2023-03-17 DIAGNOSIS — M22.41 PATELLOFEMORAL CHONDROSIS OF RIGHT KNEE: Primary | ICD-10-CM

## 2023-03-17 PROCEDURE — 73564 X-RAY EXAM KNEE 4 OR MORE: CPT | Performed by: ORTHOPAEDIC SURGERY

## 2023-03-17 PROCEDURE — 99214 OFFICE O/P EST MOD 30 MIN: CPT | Performed by: ORTHOPAEDIC SURGERY

## 2023-04-28 ENCOUNTER — HOSPITAL ENCOUNTER (OUTPATIENT)
Dept: MAMMOGRAPHY | Facility: HOSPITAL | Age: 73
Discharge: HOME | End: 2023-04-28
Attending: INTERNAL MEDICINE
Payer: MEDICARE

## 2023-04-28 DIAGNOSIS — F32.A ANXIETY AND DEPRESSION: ICD-10-CM

## 2023-04-28 DIAGNOSIS — Z12.31 ENCOUNTER FOR SCREENING MAMMOGRAM FOR MALIGNANT NEOPLASM OF BREAST: ICD-10-CM

## 2023-04-28 DIAGNOSIS — E03.9 HYPOTHYROIDISM, UNSPECIFIED TYPE: ICD-10-CM

## 2023-04-28 DIAGNOSIS — B20 HUMAN IMMUNODEFICIENCY VIRUS (HIV) DISEASE (HCC): ICD-10-CM

## 2023-04-28 DIAGNOSIS — F41.9 ANXIETY AND DEPRESSION: ICD-10-CM

## 2023-04-28 PROCEDURE — 77063 BREAST TOMOSYNTHESIS BI: CPT | Performed by: INTERNAL MEDICINE

## 2023-04-28 PROCEDURE — 77067 SCR MAMMO BI INCL CAD: CPT | Performed by: INTERNAL MEDICINE

## 2023-05-03 ENCOUNTER — HOSPITAL ENCOUNTER (OUTPATIENT)
Dept: ULTRASOUND IMAGING | Facility: HOSPITAL | Age: 73
Discharge: HOME | End: 2023-05-03
Attending: INTERNAL MEDICINE
Payer: MEDICARE

## 2023-05-03 ENCOUNTER — HOSPITAL ENCOUNTER (OUTPATIENT)
Dept: MAMMOGRAPHY | Facility: HOSPITAL | Age: 73
Discharge: HOME | End: 2023-05-03
Attending: INTERNAL MEDICINE
Payer: MEDICARE

## 2023-05-03 DIAGNOSIS — R92.8 ABNORMAL MAMMOGRAM: ICD-10-CM

## 2023-05-03 PROCEDURE — 77065 DX MAMMO INCL CAD UNI: CPT | Performed by: INTERNAL MEDICINE

## 2023-05-03 PROCEDURE — 76642 ULTRASOUND BREAST LIMITED: CPT | Performed by: INTERNAL MEDICINE

## 2023-05-03 PROCEDURE — 77061 BREAST TOMOSYNTHESIS UNI: CPT | Performed by: INTERNAL MEDICINE

## 2023-06-20 ENCOUNTER — PATIENT MESSAGE (OUTPATIENT)
Dept: INTERNAL MEDICINE CLINIC | Facility: CLINIC | Age: 73
End: 2023-06-20

## 2023-06-21 NOTE — TELEPHONE ENCOUNTER
Tanner Alfaro RN 6/21/2023 10:00 AM CDT        ----- Message -----  From: Delmer Anderson \"Yanna\"  Sent: 6/20/2023 7:18 PM CDT  To: Em Triage Support  Subject: B12     I am going for cholesterol blood work this Saturday. Maybe I can get b12 checked as well

## 2023-06-22 NOTE — TELEPHONE ENCOUNTER
Noted the danny msg--thank you nurses for the many replies   Will await response /  discuss at next visit  Encounter closed (the encounter was already closed)

## 2023-06-24 ENCOUNTER — LAB ENCOUNTER (OUTPATIENT)
Dept: LAB | Age: 73
End: 2023-06-24
Attending: INTERNAL MEDICINE
Payer: MEDICARE

## 2023-06-24 DIAGNOSIS — E78.2 MIXED HYPERLIPIDEMIA: ICD-10-CM

## 2023-06-24 LAB
CHOLEST SERPL-MCNC: 233 MG/DL (ref ?–200)
FASTING PATIENT LIPID ANSWER: YES
HDLC SERPL-MCNC: 57 MG/DL (ref 40–59)
LDLC SERPL CALC-MCNC: 134 MG/DL (ref ?–100)
NONHDLC SERPL-MCNC: 176 MG/DL (ref ?–130)
TRIGL SERPL-MCNC: 237 MG/DL (ref 30–149)
VLDLC SERPL CALC-MCNC: 44 MG/DL (ref 0–30)

## 2023-06-24 PROCEDURE — 80061 LIPID PANEL: CPT

## 2023-06-24 PROCEDURE — 36415 COLL VENOUS BLD VENIPUNCTURE: CPT

## 2023-06-26 RX ORDER — AZELASTINE HYDROCHLORIDE 137 UG/1
SPRAY, METERED NASAL
Qty: 1 EACH | Refills: 2 | Status: SHIPPED | OUTPATIENT
Start: 2023-06-26

## 2023-06-26 NOTE — TELEPHONE ENCOUNTER
Refill requested for   Requested Prescriptions   Pending Prescriptions Disp Refills    AZELASTINE  MCG/SPRAY Nasal Solution [Pharmacy Med Name: AZELASTINE 0.1% (137 MCG) SPRY]  2     Sig: SPRAY 2 SPRAYS BY NASAL ROUTE DAILY       Antihistamines Passed - 6/24/2023  5:31 AM        Passed - Appt in past 12 mos or next 1 mos     Recent Outpatient Visits              3 months ago Patellofemoral chondrosis of right knee    [facility], [address], [place] Galarza MD    Office Visit    5 months ago Moderate persistent extrinsic asthma without complication    [facility], [facility] Robby Roach MD    Office Visit    6 months ago [facility]-19 virus infection    [facility] [address], [address], [facility], APR    Office Visit    8 months ago Encounter for annual health examination    Jaime Morfin MD    Office Visit    8 months ago [place] [facility] oral    [employer], [address], [address], [place] DANE Casarez    Office Visit          Future Appointments         Provider Department Appt Notes    In 3 months Alexander Boss MD [address] [facility],[address], [address], [place] Symptoms worsened                   Last office visit: 01/23/2023    Previously advised to follow up in No follow-up timeline advised in last office visit. Diagnosis of asthma advised to follow-up in 6 months    F/U currently scheduled?  Not at this time    Date of last refill: 01/23/2023    ACTION: Refill filled per protocol

## 2023-07-17 ENCOUNTER — NURSE TRIAGE (OUTPATIENT)
Dept: INTERNAL MEDICINE CLINIC | Facility: CLINIC | Age: 73
End: 2023-07-17

## 2023-07-17 ENCOUNTER — PATIENT MESSAGE (OUTPATIENT)
Dept: INTERNAL MEDICINE CLINIC | Facility: CLINIC | Age: 73
End: 2023-07-17

## 2023-07-17 NOTE — TELEPHONE ENCOUNTER
Action Requested: Summary for Provider     []  Critical Lab, Recommendations Needed  [] Need Additional Advice  []   FYI    []   Need Orders  [] Need Medications Sent to Pharmacy  []  Other     SUMMARY: Patient states she often gets sciatica pain. Patient states 3 days ago, she started having moderate lower back pain with pain radiating down the right leg. Patient states she has been going to work still and is taking ibuprofen with little relief. Patient advised an appointment. She states she can not come in, but can do a video appointment. Video appointment scheduled with  for tomorrow at 11:20. Patient advised to try cold pack and heat also. Patient verbalized understanding.     Reason for call: Low Back Pain  Onset: 3 days  Reason for Disposition   MODERATE back pain (e.g., interferes with normal activities) and present > 3 days    Protocols used: Back Pain-A-[place]

## 2023-07-18 ENCOUNTER — VIRTUAL PHONE E/M (OUTPATIENT)
Dept: INTERNAL MEDICINE CLINIC | Facility: CLINIC | Age: 73
End: 2023-07-18

## 2023-07-18 DIAGNOSIS — K92.1 BLOOD IN STOOL: ICD-10-CM

## 2023-07-18 DIAGNOSIS — M54.32 BILATERAL SCIATICA: Primary | ICD-10-CM

## 2023-07-18 DIAGNOSIS — E78.2 MIXED HYPERLIPIDEMIA: ICD-10-CM

## 2023-07-18 DIAGNOSIS — M54.31 BILATERAL SCIATICA: Primary | ICD-10-CM

## 2023-07-18 PROCEDURE — 99214 OFFICE O/P EST MOD 30 MIN: CPT | Performed by: INTERNAL MEDICINE

## 2023-07-18 RX ORDER — CYCLOBENZAPRINE HCL 10 MG
10 TABLET ORAL 2 TIMES DAILY PRN
Qty: 30 TABLET | Refills: 1 | Status: SHIPPED | OUTPATIENT
Start: 2023-07-18

## 2023-07-18 RX ORDER — TRAMADOL HYDROCHLORIDE 50 MG/1
50 TABLET ORAL EVERY EVENING
Qty: 2 TABLET | Refills: 0 | Status: SHIPPED | OUTPATIENT
Start: 2023-07-18

## 2023-07-18 NOTE — PROGRESS NOTES
Patient ID: Rustam Rojas is a 72year old female. No chief complaint on file. HISTORY OF PRESENT ILLNESS:   Patient presents for above. This visit is conducted using Telemedicine with live, interactive video and audio. C/c sciatica x 3 days   C/o taking ibuprofen and noted some blood in the stool, does have hemorroids   Utd with colonoscopy   Both sides 10/10 ,can't sleep   Went to [facility] in [place] [address] and xrays were taken       Review of Systems   MEDICAL HISTORY:     Past Medical History:   Diagnosis Date    Allergic rhinitis     Anxiety state     occasional, no medication    Asthma     Disorder of thyroid     hypothyroidism    History of blood transfusion     [facility]    HIV (human immunodeficiency virus infection) ([facility] [address]     HIV (human immunodeficiency virus infection) ([facility] [address]     Hypothyroidism     Internal hemorrhoids 10/20/2017    Tinnitus     Visual impairment     right eye, glasses       Past Surgical History:   Procedure Laterality Date          CATARACT      surgery     COLONOSCOPY N/A 10/20/2017    Procedure: COLONOSCOPY;  Surgeon: Geovanny Boyle MD;  Location: [address] [place] [address] ENDOSCOPY    EGD  2018        EYE SURGERY Right          Current Outpatient Medications:     traMADol 50 MG Oral Tab, Take 1 tablet (50 mg total) by mouth every evening., Disp: 2 tablet, Rfl: 0    cyclobenzaprine 10 MG Oral Tab, Take 1 tablet (10 mg total) by mouth 2 (two) times daily as needed for Muscle spasms. , Disp: 30 tablet, Rfl: 1    Azelastine HCl 137 MCG/SPRAY Nasal Solution, SPRAY 2 SPRAYS BY NASAL ROUTE DAILY, Disp: 1 each, Rfl: 2    levothyroxine 75 MCG Oral Tab, Take 1 tablet (75 mcg total) by mouth before breakfast., Disp: 90 tablet, Rfl: 3    albuterol 108 (90 Base) MCG/ACT Inhalation Aero Soln, Inhale 2 puffs into the lungs every 4 to 6 hours as needed for Wheezing., Disp: 18 g, Rfl: 0    clotrimazole 1 % External Cream, APPLY TO RASH ON THE LOWER CHEST AND LOWER ABDOMEN ONCE DAILY INDEFINITELY GENTLY SMALL AMOUNTS, Disp: , Rfl:     Mometasone Furoate 0.1 % External Cream, 1 ring. every 28 days. FOR 21 DAYS IN, THEN REMOVE FOR 7 DAYS, Disp: , Rfl:     benzonatate 200 MG Oral Cap, Take 1 capsule (200 mg total) by mouth 3 (three) times daily as needed for cough. , Disp: 30 capsule, Rfl: 0    Etravirine (INTELENCE) 200 MG Oral Tab, Take 1 tablet by mouth 2 (two) times a day., Disp: 180 tablet, Rfl: 0    levocetirizine 5 MG Oral Tab, Take 1 tablet (5 mg total) by mouth every evening. AT BEDTIME, Disp: 90 tablet, Rfl: 1    Allergies:  Dust Mite Extract       ITCHING    Social History    Socioeconomic History      Marital status: Single      Spouse name: Not on file      Number of children: Not on file      Years of education: Not on file      Highest education level: Not on file    Occupational History      Not on file    Tobacco Use      Smoking status: Never      Smokeless tobacco: Never      Tobacco comments: Pt denies passive smoke exposure in her home. Vaping Use      Vaping Use: Never used    Substance and Sexual Activity      Alcohol use: Not Currently        Comment: occasional wine      Drug use: No      Sexual activity: Not on file    Other Topics      Concerns:        Caffeine Concern: Not Asked        Exercise: Not Asked        Seat Belt: Not Asked        Special Diet: Not Asked        Stress Concern: Not Asked        Weight Concern: Not Asked    Social History Narrative      Not on file    Social Determinants of Health  Financial Resource Strain: Not on file  Food Insecurity: Not on file  Transportation Needs: Not on file  Physical Activity: Not on file  Stress: Not on file  Social Connections: Not on file  Housing Stability: Not on file    PHYSICAL EXAM:   Unable to perform vitals or do physical exam as this is a virtual video visit.   Patient appears alert and oriented x3, no acute distress  Patient speaking complete  Conversational dyspnea or respiratory distress  No coughing heard  Noted patient was at work and was walking outside to try to stretch out her back  ASSESSMENT/PLAN:   1. Bilateral sciatica  - PHYSICAL THERAPY - INTERNAL  - XR LUMBAR SPINE (MIN 4 VIEWS) (CPT=72110); Future  - traMADol 50 MG Oral Tab; Take 1 tablet (50 mg total) by mouth every evening. Dispense: 2 tablet; Refill: 0  - cyclobenzaprine 10 MG Oral Tab; Take 1 tablet (10 mg total) by mouth 2 (two) times daily as needed for Muscle spasms. Dispense: 30 tablet; Refill: 1  Check baseline x-ray, refer to physical therapy, try muscle relaxant and will give just a couple of the tramadol is for severe pain to be used on an as-needed basis    2. Mixed hyperlipidemia  - CT CALCIUM SCORING; Future  Noted cholesterol is increasing, she is concerned about this, will check CT calcium scoring and consider starting statin    3. Blood in stool  Up-to-date with her colonoscopy and has an appointment with GI coming up but aware that if she continues to have blood in the stool then it needs to be moved up    No follow-ups on file. Time spent on encounter  14 minutes   Video time 14 minutes   Documentation time 14 minutes     M Milton Comment understands video evaluation is not a substitute for face-to-face examination or emergency care. Patient advised to go to ER or call 911 for worsening symptoms or acute distress. Telehealth outside of [address] Verbal Consent   I conducted a telehealth visit with Betty Fox today, 07/18/23, which was completed using two-way, real-time interactive audio and video communication. This has been done in good rashi to provide continuity of care in the best interest of the provider-patient relationship, due to the COVID -19 public health crisis/national emergency where restrictions of face-to-face office visits are ongoing. Every conscious effort was taken to allow for sufficient and adequate time to complete the visit.   The patient was made aware of the limitations of the telehealth visit, including treatment limitations as no physical exam could be performed. The patient was advised to call 911 or to go to the ER in case there was an emergency. The patient was also advised of the potential privacy & security concerns related to the telehealth platform. The patient was made aware of where to find [facility] notice of privacy practices, telehealth consent form and other related consent forms and documents. which are located on the [facility] website. The patient verbally agreed to telehealth consent form, related consents and the risks discussed. Lastly, the patient confirmed that they were in [place]. Included in this visit, time may have been spent reviewing labs, medications, radiology tests and decision making. Appropriate medical decision-making and tests are ordered as detailed in the plan of care above. Coding/billing information is submitted for this visit based on complexity of care and/or time spent for the visit. This note was prepared using latakoo voice recognition dictation software. As a result errors may occur. When identified these errors have been corrected.  While every attempt is made to correct errors during dictation discrepancies may still exist.    Noni Arias MD  7/18/2023

## 2023-08-10 ENCOUNTER — HOSPITAL ENCOUNTER (OUTPATIENT)
Dept: CT IMAGING | Facility: HOSPITAL | Age: 73
Discharge: HOME | End: 2023-08-10
Attending: INTERNAL MEDICINE

## 2023-08-10 ENCOUNTER — HOSPITAL ENCOUNTER (OUTPATIENT)
Dept: GENERAL RADIOLOGY | Facility: HOSPITAL | Age: 73
Discharge: HOME | End: 2023-08-10
Attending: INTERNAL MEDICINE
Payer: MEDICARE

## 2023-08-10 VITALS
WEIGHT: 135 LBS | HEIGHT: 58 IN | DIASTOLIC BLOOD PRESSURE: 78 MMHG | SYSTOLIC BLOOD PRESSURE: 118 MMHG | BODY MASS INDEX: 28.34 KG/M2

## 2023-08-10 DIAGNOSIS — E78.2 MIXED HYPERLIPIDEMIA: ICD-10-CM

## 2023-08-10 DIAGNOSIS — M54.31 BILATERAL SCIATICA: ICD-10-CM

## 2023-08-10 DIAGNOSIS — M54.32 BILATERAL SCIATICA: ICD-10-CM

## 2023-08-10 LAB
POCT GLUCOSE CHOLESTECH: 68 (ref 70–99)
POCT HDL: 49 (ref 40–60)
POCT LDL: 145 (ref 0–99)
POCT TOTAL CHOLESTEROL: 235 (ref 110–200)
POCT TRIGLYCERIDES: 207 (ref 1–149)

## 2023-08-10 PROCEDURE — 72110 X-RAY EXAM L-2 SPINE 4/>VWS: CPT | Performed by: INTERNAL MEDICINE

## 2023-10-04 ENCOUNTER — OFFICE VISIT (OUTPATIENT)
Facility: CLINIC | Age: 73
End: 2023-10-04

## 2023-10-04 VITALS
DIASTOLIC BLOOD PRESSURE: 78 MMHG | HEIGHT: 58 IN | HEART RATE: 72 BPM | WEIGHT: 134 LBS | BODY MASS INDEX: 28.13 KG/M2 | SYSTOLIC BLOOD PRESSURE: 151 MMHG

## 2023-10-04 DIAGNOSIS — R19.7 DIARRHEA, UNSPECIFIED TYPE: Primary | ICD-10-CM

## 2023-10-04 PROCEDURE — 99214 OFFICE O/P EST MOD 30 MIN: CPT | Performed by: INTERNAL MEDICINE

## 2023-10-04 NOTE — PROGRESS NOTES
Yolanda Aguilar is a 72year old female. HPI:   Patient presents with:  Diarrhea  Heartburn  Nausea    The patient is a 70-year-old female who has a history of anxiety, asthma, thyroid disorder, HIV infection on treatment, internal hemorrhoids, tendinitis, GERD with hiatal hernia seen today for irregular bowel habits. The patient reports that over the last couple weeks she has noted her bowel habits have changed, she normally had 1 bowel movement per day in the past which was formed and without blood. However over the last 14 days she has noted multiple looser stools in the morning with the last bowel movement in the morning typically watery. No blood. She did not change any medications, she has not been on antibiotics, no changes in diet, no travel history. Patient does have a history of chronic reflux worsening symptoms in the chest region which she describes as a burning sensation. Is worse if she lays down and or notes in the lower chest.  She has been taking omeprazole 20 mg at night. Prior upper endoscopy from 2022, hiatal hernia, Schatzki's ring status post dilation and gastritis.     HISTORY:  Past Medical History:   Diagnosis Date    Allergic rhinitis     Anxiety state     occasional, no medication    Asthma     Disorder of thyroid     hypothyroidism    History of blood transfusion     [facility]    HIV (human immunodeficiency virus infection) ([facility])     HIV (human immunodeficiency virus infection) ([facility])     Hypothyroidism     Internal hemorrhoids 10/20/2017    Tinnitus     Visual impairment     right eye, glasses      Past Surgical History:   Procedure Laterality Date          CATARACT      surgery     COLONOSCOPY N/A 10/20/2017    Procedure: COLONOSCOPY;  Surgeon: Garrett Hernandez MD;  Location: [address] [place] [address] ENDOSCOPY    EGD  2018        EYE SURGERY Right       Family History   Problem Relation Age of Onset    Hypertension Father Diabetes Sister     Other (cervical cancer) Maternal Cousin Male         pre josefa      Social History:   Social History     Socioeconomic History    Marital status: Single   Tobacco Use    Smoking status: Never    Smokeless tobacco: Never    Tobacco comments:     Pt denies passive smoke exposure in her home. Vaping Use    Vaping Use: Never used   Substance and Sexual Activity    Alcohol use: Not Currently     Comment: occasional wine    Drug use: No        Medications (Active prior to today's visit):  Current Outpatient Medications   Medication Sig Dispense Refill    traMADol 50 MG Oral Tab Take 1 tablet (50 mg total) by mouth every evening. 2 tablet 0    cyclobenzaprine 10 MG Oral Tab Take 1 tablet (10 mg total) by mouth 2 (two) times daily as needed for Muscle spasms. 30 tablet 1    Azelastine HCl 137 MCG/SPRAY Nasal Solution SPRAY 2 SPRAYS BY NASAL ROUTE DAILY 1 each 2    levothyroxine 75 MCG Oral Tab Take 1 tablet (75 mcg total) by mouth before breakfast. 90 tablet 3    albuterol 108 (90 Base) MCG/ACT Inhalation Aero Soln Inhale 2 puffs into the lungs every 4 to 6 hours as needed for Wheezing. 18 g 0    clotrimazole 1 % External Cream APPLY TO RASH ON THE LOWER CHEST AND LOWER ABDOMEN ONCE DAILY INDEFINITELY GENTLY SMALL AMOUNTS      Mometasone Furoate 0.1 % External Cream 1 ring. every 28 days. FOR 21 DAYS IN, THEN REMOVE FOR 7 DAYS      levocetirizine 5 MG Oral Tab Take 1 tablet (5 mg total) by mouth every evening. AT BEDTIME 90 tablet 1    benzonatate 200 MG Oral Cap Take 1 capsule (200 mg total) by mouth 3 (three) times daily as needed for cough. 30 capsule 0    Etravirine (INTELENCE) 200 MG Oral Tab Take 1 tablet by mouth 2 (two) times a day. 180 tablet 0       Allergies:    Dust Mite Extract       ITCHING      ROS:   The patient denies any chest pain or shortness of breath,  No neurologic or dermatologic symptoms.      PHYSICAL EXAM:   Blood pressure 151/78, pulse 72, height 4' 10\" (1.473 m), weight 134 lb (60.8 kg), not currently breastfeeding. The patient appears their stated age and is in no acute distress  HEENT- anicteric sclera, neck no lymphadnopathy, OP- clear with no masses or lesions  Chest- Clear bilaterally, no wheezing,  Heart- regular rate, no murmur or gallop  Abdomen- Soft and nontender, nondistended  Ext- no clubbing or cyanosis  Skin- no rashes or lesions  Neuro- appropriate response, alert, no confusion  . ASSESSMENT/PLAN:   Assessment     The patient has a history of hiatal hernia and GERD who now has been presenting with looser stools, change in bowel habits. Discussed dietary, stress or IBS/functional versus inflammatory. Interesting that she has been taking increased NSAIDs for her back/sciatic issues. Advised that she cut down to this and try to use more Tylenol. Plan stool testing and further planning per outlined below. Plan  GERD   Hiatal hernia   - increase omeprazole to 20mg twice a day   - avoid eating for 3 hours before bed  - avoid spicy foods and triggers    Irregular bowel habits  - limit ibuprofen and advil   - stool tests   - FODMAP diet   - colonoscopy if ongoing issues. Orders This Visit:  No orders of the defined types were placed in this encounter.       Meds This Visit:  Requested Prescriptions      No prescriptions requested or ordered in this encounter       Imaging & Referrals:  None       [facility], [address] [place] [address] Gastroenterology

## 2023-10-04 NOTE — PATIENT INSTRUCTIONS
GERD   Hiatal hernia   - increase omeprazole to 20mg twice a day   - avoid eating for 3 hours before bed  - avoid spicy foods and triggers    Irregular bowel habits  - limit ibuprofen and advil   - stool tests   - FODMAP diet   - colonoscopy if ongoing issues.

## 2023-10-19 NOTE — TELEPHONE ENCOUNTER
LMTCB on patient's voicemail. Message left stating that a nurse must speak with her over the telephone in order to triage her respiratory status prior to refilling the medication, and that patient must schedule an Allergy Physician f/u appt in order to send a refill. MyChart message sent. Patient last seen in Allergy for physician visit on 1/23/2023 for .  . .    Moderate persistent extrinsic asthma without complication  (primary encounter diagnosis)  Allergic rhinitis due to animal dander  Allergic rhinitis due to dust mite  Flu vaccine need  Covid-19 vaccine series completed  Human immunodeficiency virus (hiv) disease (hcc)  Dysfunction of left eustachian tube    Requested Prescriptions   Pending Prescriptions Disp Refills    VENTOLIN  (90 Base) MCG/ACT Inhalation Aero Soln [Pharmacy Med Name: VENTOLIN HFA 90 MCG INHALER] 18 each 0     Sig: INHALE 2 PUFFS INTO THE LUNGS EVERY 4 TO 6 HOURS AS NEEDED FOR WHEEZING       Rescue Inhalers Failed - 10/19/2023  5:09 AM        Failed - Appt in past 6 mos or next 3 mos     Recent Outpatient Visits              2 weeks ago Diarrhea, unspecified type    [address], [address], Ward Pablo MD    Office Visit    3 months ago Bilateral sciatica    [address], Yemi Flores MD    [employer] [facility]    7 months ago Patellofemoral chondrosis of right knee    Michell Murphy Kassandra Fairly, MD    Office Visit    8 months ago Moderate persistent extrinsic asthma without complication    Yemi Drake MD    Office Visit    9 months ago COVID-19 virus infection    [facility], [address], [facility], APR    Office Visit          Future Appointments         Provider Department Appt Notes    In 1 month Cindi Jett MD [facility], [address], [facility]                      Failed - Pass - Pending Nurse Triage Call        Passed - Last Refill > 30 days     Last rescue inhaler refill: 1/9/2023

## 2023-10-21 RX ORDER — ALBUTEROL SULFATE 90 UG/1
2 AEROSOL, METERED RESPIRATORY (INHALATION)
Qty: 18 G | Refills: 0 | Status: SHIPPED | OUTPATIENT
Start: 2023-10-21

## 2023-10-21 NOTE — TELEPHONE ENCOUNTER
RN called to patient to triage for albuterol inhaler refill   Per patient asthma status is doing well   Uses inhaler once or twice a week with good relief after  Does not have to use inhaler sooner than 4 hours after use  Patient aware to go to urgent care/ER if having to use inhaler sooner or not receiving good relief  Follow-up appointment scheduled for 12/2/23  Refill filled per protocol

## 2023-11-10 ENCOUNTER — LAB ENCOUNTER (OUTPATIENT)
Dept: LAB | Facility: HOSPITAL | Age: 73
End: 2023-11-10
Attending: INTERNAL MEDICINE
Payer: MEDICARE

## 2023-11-10 DIAGNOSIS — R19.7 DIARRHEA, UNSPECIFIED TYPE: ICD-10-CM

## 2023-11-10 LAB
C DIFF TOX B STL QL: NEGATIVE
CRYPTOSP AG STL QL IA: NEGATIVE
G LAMBLIA AG STL QL IA: NEGATIVE

## 2023-11-10 PROCEDURE — 87427 SHIGA-LIKE TOXIN AG IA: CPT

## 2023-11-10 PROCEDURE — 87493 C DIFF AMPLIFIED PROBE: CPT

## 2023-11-10 PROCEDURE — 87045 FECES CULTURE AEROBIC BACT: CPT

## 2023-11-10 PROCEDURE — 87272 CRYPTOSPORIDIUM AG IF: CPT

## 2023-11-10 PROCEDURE — 87046 STOOL CULTR AEROBIC BACT EA: CPT

## 2023-11-10 PROCEDURE — 87329 GIARDIA AG IA: CPT

## 2023-11-21 ENCOUNTER — OFFICE VISIT (OUTPATIENT)
Dept: INTERNAL MEDICINE CLINIC | Facility: CLINIC | Age: 73
End: 2023-11-21
Payer: MEDICARE

## 2023-11-21 VITALS
DIASTOLIC BLOOD PRESSURE: 68 MMHG | BODY MASS INDEX: 29.26 KG/M2 | HEART RATE: 65 BPM | OXYGEN SATURATION: 97 % | SYSTOLIC BLOOD PRESSURE: 110 MMHG | HEIGHT: 58 IN | WEIGHT: 139.38 LBS

## 2023-11-21 DIAGNOSIS — Z78.0 ASYMPTOMATIC MENOPAUSE: ICD-10-CM

## 2023-11-21 DIAGNOSIS — E03.9 HYPOTHYROIDISM, UNSPECIFIED TYPE: ICD-10-CM

## 2023-11-21 DIAGNOSIS — Z51.81 THERAPEUTIC DRUG MONITORING: ICD-10-CM

## 2023-11-21 DIAGNOSIS — R79.9 ABNORMAL FINDING OF BLOOD CHEMISTRY, UNSPECIFIED: ICD-10-CM

## 2023-11-21 DIAGNOSIS — Z00.00 MEDICARE ANNUAL WELLNESS VISIT, SUBSEQUENT: Primary | ICD-10-CM

## 2023-11-21 DIAGNOSIS — Z12.31 ENCOUNTER FOR SCREENING MAMMOGRAM FOR MALIGNANT NEOPLASM OF BREAST: ICD-10-CM

## 2023-11-21 DIAGNOSIS — B20 HUMAN IMMUNODEFICIENCY VIRUS (HIV) DISEASE (HCC): ICD-10-CM

## 2023-11-21 PROBLEM — I77.1 TORTUOUS AORTA: Status: RESOLVED | Noted: 2021-01-14 | Resolved: 2023-11-21

## 2023-11-21 PROBLEM — K64.8 INTERNAL HEMORRHOIDS: Status: RESOLVED | Noted: 2017-10-20 | Resolved: 2023-11-21

## 2023-11-21 PROBLEM — I77.1 TORTUOUS AORTA (HCC): Status: RESOLVED | Noted: 2021-01-14 | Resolved: 2023-11-21

## 2023-12-01 ENCOUNTER — LAB ENCOUNTER (OUTPATIENT)
Dept: LAB | Age: 73
End: 2023-12-01
Attending: INTERNAL MEDICINE
Payer: MEDICARE

## 2023-12-01 DIAGNOSIS — Z78.0 ASYMPTOMATIC MENOPAUSE: ICD-10-CM

## 2023-12-01 DIAGNOSIS — R79.9 ABNORMAL FINDING OF BLOOD CHEMISTRY, UNSPECIFIED: ICD-10-CM

## 2023-12-01 DIAGNOSIS — Z51.81 THERAPEUTIC DRUG MONITORING: ICD-10-CM

## 2023-12-01 DIAGNOSIS — B20 HUMAN IMMUNODEFICIENCY VIRUS (HIV) DISEASE (HCC): ICD-10-CM

## 2023-12-01 DIAGNOSIS — Z00.00 MEDICARE ANNUAL WELLNESS VISIT, SUBSEQUENT: ICD-10-CM

## 2023-12-01 DIAGNOSIS — Z12.31 ENCOUNTER FOR SCREENING MAMMOGRAM FOR MALIGNANT NEOPLASM OF BREAST: ICD-10-CM

## 2023-12-01 DIAGNOSIS — E03.9 HYPOTHYROIDISM, UNSPECIFIED TYPE: ICD-10-CM

## 2023-12-01 LAB
ALBUMIN SERPL-MCNC: 4.4 G/DL (ref 3.2–4.8)
ALBUMIN/GLOB SERPL: 1.7 {RATIO} (ref 1–2)
ALP LIVER SERPL-CCNC: 92 U/L
ALT SERPL-CCNC: 12 U/L
ANION GAP SERPL CALC-SCNC: 8 MMOL/L (ref 0–18)
AST SERPL-CCNC: 18 U/L (ref ?–34)
BASOPHILS # BLD AUTO: 0.03 X10(3) UL (ref 0–0.2)
BASOPHILS NFR BLD AUTO: 0.7 %
BILIRUB SERPL-MCNC: 0.5 MG/DL (ref 0.2–1.1)
BUN BLD-MCNC: 11 MG/DL (ref 9–23)
BUN/CREAT SERPL: 19 (ref 10–20)
CALCIUM BLD-MCNC: 9.5 MG/DL (ref 8.7–10.4)
CHLORIDE SERPL-SCNC: 107 MMOL/L (ref 98–112)
CHOLEST SERPL-MCNC: 229 MG/DL (ref ?–200)
CO2 SERPL-SCNC: 25 MMOL/L (ref 21–32)
CREAT BLD-MCNC: 0.58 MG/DL
DEPRECATED RDW RBC AUTO: 39.4 FL (ref 35.1–46.3)
EGFRCR SERPLBLD CKD-EPI 2021: 97 ML/MIN/1.73M2 (ref 60–?)
EOSINOPHIL # BLD AUTO: 0.02 X10(3) UL (ref 0–0.7)
EOSINOPHIL NFR BLD AUTO: 0.4 %
ERYTHROCYTE [DISTWIDTH] IN BLOOD BY AUTOMATED COUNT: 12.3 % (ref 11–15)
EST. AVERAGE GLUCOSE BLD GHB EST-MCNC: 111 MG/DL (ref 68–126)
FASTING PATIENT LIPID ANSWER: YES
FASTING STATUS PATIENT QL REPORTED: YES
GLOBULIN PLAS-MCNC: 2.6 G/DL (ref 2.8–4.4)
GLUCOSE BLD-MCNC: 90 MG/DL (ref 70–99)
HBA1C MFR BLD: 5.5 % (ref ?–5.7)
HCT VFR BLD AUTO: 41.2 %
HDLC SERPL-MCNC: 55 MG/DL (ref 40–59)
HGB BLD-MCNC: 13.7 G/DL
IMM GRANULOCYTES # BLD AUTO: 0.01 X10(3) UL (ref 0–1)
IMM GRANULOCYTES NFR BLD: 0.2 %
LDLC SERPL CALC-MCNC: 135 MG/DL (ref ?–100)
LYMPHOCYTES # BLD AUTO: 1.52 X10(3) UL (ref 1–4)
LYMPHOCYTES NFR BLD AUTO: 33.6 %
MCH RBC QN AUTO: 29 PG (ref 26–34)
MCHC RBC AUTO-ENTMCNC: 33.3 G/DL (ref 31–37)
MCV RBC AUTO: 87.1 FL
MONOCYTES # BLD AUTO: 0.43 X10(3) UL (ref 0.1–1)
MONOCYTES NFR BLD AUTO: 9.5 %
NEUTROPHILS # BLD AUTO: 2.52 X10 (3) UL (ref 1.5–7.7)
NEUTROPHILS # BLD AUTO: 2.52 X10(3) UL (ref 1.5–7.7)
NEUTROPHILS NFR BLD AUTO: 55.6 %
NONHDLC SERPL-MCNC: 174 MG/DL (ref ?–130)
OSMOLALITY SERPL CALC.SUM OF ELEC: 289 MOSM/KG (ref 275–295)
PLATELET # BLD AUTO: 198 10(3)UL (ref 150–450)
POTASSIUM SERPL-SCNC: 3.5 MMOL/L (ref 3.5–5.1)
PROT SERPL-MCNC: 7 G/DL (ref 5.7–8.2)
RBC # BLD AUTO: 4.73 X10(6)UL
SODIUM SERPL-SCNC: 140 MMOL/L (ref 136–145)
TRIGL SERPL-MCNC: 218 MG/DL (ref 30–149)
TSI SER-ACNC: 1.56 MIU/ML (ref 0.55–4.78)
VIT D+METAB SERPL-MCNC: 8.6 NG/ML (ref 30–100)
VLDLC SERPL CALC-MCNC: 40 MG/DL (ref 0–30)
WBC # BLD AUTO: 4.5 X10(3) UL (ref 4–11)

## 2023-12-01 PROCEDURE — 84443 ASSAY THYROID STIM HORMONE: CPT

## 2023-12-01 PROCEDURE — 80053 COMPREHEN METABOLIC PANEL: CPT

## 2023-12-01 PROCEDURE — 85025 COMPLETE CBC W/AUTO DIFF WBC: CPT

## 2023-12-01 PROCEDURE — 36415 COLL VENOUS BLD VENIPUNCTURE: CPT

## 2023-12-01 PROCEDURE — 80061 LIPID PANEL: CPT

## 2023-12-01 PROCEDURE — 83036 HEMOGLOBIN GLYCOSYLATED A1C: CPT

## 2023-12-01 PROCEDURE — 82306 VITAMIN D 25 HYDROXY: CPT

## 2023-12-02 ENCOUNTER — TELEMEDICINE (OUTPATIENT)
Dept: ALLERGY | Facility: CLINIC | Age: 73
End: 2023-12-02
Payer: MEDICARE

## 2023-12-02 DIAGNOSIS — Z23 FLU VACCINE NEED: ICD-10-CM

## 2023-12-02 DIAGNOSIS — Z92.29 COVID-19 VACCINE SERIES COMPLETED: ICD-10-CM

## 2023-12-02 DIAGNOSIS — J30.89 ALLERGIC RHINITIS DUE TO DUST MITE: ICD-10-CM

## 2023-12-02 DIAGNOSIS — B20 HUMAN IMMUNODEFICIENCY VIRUS (HIV) DISEASE (HCC): ICD-10-CM

## 2023-12-02 DIAGNOSIS — J30.81 ALLERGIC RHINITIS DUE TO ANIMAL DANDER: ICD-10-CM

## 2023-12-02 DIAGNOSIS — J45.40 MODERATE PERSISTENT EXTRINSIC ASTHMA WITHOUT COMPLICATION: Primary | ICD-10-CM

## 2023-12-02 PROCEDURE — 99214 OFFICE O/P EST MOD 30 MIN: CPT | Performed by: ALLERGY & IMMUNOLOGY

## 2023-12-02 RX ORDER — BUDESONIDE AND FORMOTEROL FUMARATE DIHYDRATE 80; 4.5 UG/1; UG/1
2 AEROSOL RESPIRATORY (INHALATION) 2 TIMES DAILY
Qty: 3 EACH | Refills: 1 | Status: SHIPPED | OUTPATIENT
Start: 2023-12-02

## 2023-12-02 RX ORDER — ERGOCALCIFEROL 1.25 MG/1
50000 CAPSULE ORAL WEEKLY
Qty: 12 CAPSULE | Refills: 1 | Status: SHIPPED | OUTPATIENT
Start: 2023-12-02 | End: 2024-02-18

## 2023-12-02 RX ORDER — ALBUTEROL SULFATE 90 UG/1
2 AEROSOL, METERED RESPIRATORY (INHALATION) EVERY 6 HOURS PRN
Qty: 1 EACH | Refills: 1 | Status: SHIPPED | OUTPATIENT
Start: 2023-12-02

## 2023-12-02 RX ORDER — MONTELUKAST SODIUM 10 MG/1
10 TABLET ORAL NIGHTLY
Qty: 90 TABLET | Refills: 1 | Status: SHIPPED | OUTPATIENT
Start: 2023-12-02

## 2023-12-02 RX ORDER — LEVOCETIRIZINE DIHYDROCHLORIDE 5 MG/1
5 TABLET, FILM COATED ORAL EVERY EVENING
Qty: 90 TABLET | Refills: 1 | Status: SHIPPED | OUTPATIENT
Start: 2023-12-02

## 2023-12-02 NOTE — PATIENT INSTRUCTIONS
#1 asthma  Recent symptoms more than 2 days/week on average 3 days/week in spite of albuterol. No current medications. No ED visits or prednisone in the interim  Start Singulair, montelukast 10 mg once a day  Add Symbicort 2 puffs twice per day in 1 to 2 weeks if not improving with Singulair. Reviewed goals of treatment her asthma symptoms 2 days/week or less. Continue with albuterol 2 puffs every 4-6 hours if having active coughing wheezing or shortness of breath as a rescue medication      #2 allergic rhinitis  Continue with Astelin and Xyzal.  Much improved with adding Astelin. Reviewed avoidance measures and potential treatment option immunotherapy    #3 COVID-vaccine booster recommended this fall. Last dose on record was from March 2021 reviewed new booster available this fall    #4 flu vaccine [facility]-date [facility] per patient report      #5 pneumonia vaccine up-to-date    #6 HIV. Followed by ID. Counts undetectable continue with current regimen. Follow-up with ID as scheduled. Denies opportunistic infections or atypical infections or recurrent infections         Orders This Visit:  No orders of the defined types were placed in this encounter. Meds This Visit:  Requested Prescriptions     Signed Prescriptions Disp Refills    montelukast (SINGULAIR) 10 MG Oral Tab 90 tablet 1     Sig: Take 1 tablet (10 mg total) by mouth nightly. Budesonide-Formoterol Fumarate (SYMBICORT) 80-4.5 MCG/ACT Inhalation Aerosol 3 each 1     Sig: Inhale 2 puffs into the lungs 2 (two) times daily. albuterol (PROAIR HFA) 108 (90 Base) MCG/ACT Inhalation Aero Soln 1 each 1     Sig: Inhale 2 puffs into the lungs every 6 (six) hours as needed for Wheezing. levocetirizine 5 MG Oral Tab 90 tablet 1     Sig: Take 1 tablet (5 mg total) by mouth every evening.

## 2023-12-02 NOTE — PROGRESS NOTES
Varghese Merritt is a 72year old female. HPI:   No chief complaint on file. Patient is a 65-year-old female who presents for follow-up via video visit  Patient last seen by me in 2023  Patient has a history of asthma allergic rhinitis and HIV  Patient followed by infectious disease for HIV. Prior skin testing was positive to mold dust mite and dog  Medications listed include albuterol Astelin Xyzal      Vaccines reviewed. COVID-vaccine x 2 doses on record  No flu vaccine on record for this fall. Pneumonia vaccine up-to-date from     Today patient reports    Asthma  Active or persistent symptoms > 2 day sper week: 3x/week  + cough, sob,   ED visits or prednisone in the interim: denies  Active meds: albuterol helps      Ar:  Active or persistent symptoms: astelin helps a lot :    Active meds: xzyal  astelin   pets  : none       [facility],       HISTORY:  Past Medical History:   Diagnosis Date    Allergic rhinitis     Anxiety state     occasional, no medication    Asthma     Disorder of thyroid     hypothyroidism    History of blood transfusion     [facility]    HIV (human immunodeficiency virus infection) ([facility] [address]     HIV (human immunodeficiency virus infection) ([facility] [address]     Hypothyroidism     Internal hemorrhoids 10/20/2017    Tinnitus     Visual impairment     right eye, glasses      Past Surgical History:   Procedure Laterality Date          CATARACT      surgery     COLONOSCOPY N/A 10/20/2017    Procedure: COLONOSCOPY;  Surgeon: Zaid Hayes MD;  Location: [facility] ENDOSCOPY    EGD  2018        EYE SURGERY Right       Family History   Problem Relation Age of Onset    Hypertension Father     Diabetes Sister     Other (cervical cancer) Maternal Cousin Male         pre josefa      Social History:   Social History     Socioeconomic History    Marital status: Single   Tobacco Use    Smoking status: Never    Smokeless tobacco: Never    Tobacco comments:     Pt denies passive smoke exposure in her home. Vaping Use    Vaping Use: Never used   Substance and Sexual Activity    Alcohol use: Not Currently     Comment: occasional wine    Drug use: No        Medications (Active prior to today's visit):  Current Outpatient Medications   Medication Sig Dispense Refill    ergocalciferol 1.25 MG (56778 UT) Oral Cap Take 1 capsule (50,000 Units total) by mouth once a week for 12 doses. 12 capsule 1    montelukast (SINGULAIR) 10 MG Oral Tab Take 1 tablet (10 mg total) by mouth nightly. 90 tablet 1    Budesonide-Formoterol Fumarate (SYMBICORT) 80-4.5 MCG/ACT Inhalation Aerosol Inhale 2 puffs into the lungs 2 (two) times daily. 3 each 1    albuterol (PROAIR HFA) 108 (90 Base) MCG/ACT Inhalation Aero Soln Inhale 2 puffs into the lungs every 6 (six) hours as needed for Wheezing. 1 each 1    levocetirizine 5 MG Oral Tab Take 1 tablet (5 mg total) by mouth every evening. 90 tablet 1    albuterol (VENTOLIN HFA) 108 (90 Base) MCG/ACT Inhalation Aero Soln INHALE 2 PUFFS INTO THE LUNGS EVERY 4 TO 6 HOURS AS NEEDED FOR WHEEZING 18 g 0    traMADol 50 MG Oral Tab Take 1 tablet (50 mg total) by mouth every evening. 2 tablet 0    cyclobenzaprine 10 MG Oral Tab Take 1 tablet (10 mg total) by mouth 2 (two) times daily as needed for Muscle spasms. 30 tablet 1    Azelastine HCl 137 MCG/SPRAY Nasal Solution SPRAY 2 SPRAYS BY NASAL ROUTE DAILY 1 each 2    levothyroxine 75 MCG Oral Tab Take 1 tablet (75 mcg total) by mouth before breakfast. 90 tablet 3    clotrimazole 1 % External Cream APPLY TO RASH ON THE LOWER CHEST AND LOWER ABDOMEN ONCE DAILY INDEFINITELY GENTLY SMALL AMOUNTS      Mometasone Furoate 0.1 % External Cream 1 ring. every 28 days. FOR 21 DAYS IN, THEN REMOVE FOR 7 DAYS      Etravirine (INTELENCE) 200 MG Oral Tab Take 1 tablet by mouth 2 (two) times a day. 180 tablet 0       Allergies:   Allergies   Allergen Reactions    Dust Mite Extract ITCHING         ROS: Allergic/Immuno:  See hpi  Cardiovascular:  Negative for irregular heartbeat/palpitations, chest pain, edema  Constitutional:  Negative night sweats,weight loss, irritability and lethargy  ENMT:  Negative for ear drainage, hearing loss and nasal drainage  Eyes:  Negative for eye discharge and vision loss  Gastrointestinal:  Negative for abdominal pain, diarrhea and vomiting  Integumentary:  Negative for pruritus and rash  Respiratory:   see hpi     PHYSICAL EXAM:   Constitutional: responsive, no acute distress noted  Head/Face: NC/Atraumatic  Eyes/Vision: conjunctiva and lids are normal extraocular motion is intact     ASSESSMENT/PLAN:   Assessment   Encounter Diagnoses   Name Primary? Moderate persistent extrinsic asthma without complication Yes    Allergic rhinitis due to animal dander     Allergic rhinitis due to dust mite     Flu vaccine need     COVID-19 vaccine series completed     Human immunodeficiency virus (HIV) disease ([facility] [address]        #1 asthma  Recent symptoms more than 2 days/week on average 3 days/week in spite of albuterol. No current medications. No ED visits or prednisone in the interim  Start Singulair, montelukast 10 mg once a day  Add Symbicort 2 puffs twice per day in 1 to 2 weeks if not improving with Singulair. Reviewed goals of treatment her asthma symptoms 2 days/week or less. Continue with albuterol 2 puffs every 4-6 hours if having active coughing wheezing or shortness of breath as a rescue medication      #2 allergic rhinitis  Continue with Astelin and Xyzal.  Much improved with adding Astelin. Reviewed avoidance measures and potential treatment option immunotherapy    #3 COVID-vaccine booster recommended this fall. Last dose on record was from March 2021 reviewed new booster available this fall    #4 [facility]      #5 pneumonia vaccine up-to-date    #6 HIV. Followed by ID. Counts undetectable continue with current regimen.   Follow-up with ID as scheduled. Denies opportunistic infections or atypical infections or recurrent infections         Orders This Visit:  No orders of the defined types were placed in this encounter. Meds This Visit:  Requested Prescriptions     Signed Prescriptions Disp Refills    montelukast (SINGULAIR) 10 MG Oral Tab 90 tablet 1     Sig: Take 1 tablet (10 mg total) by mouth nightly. Budesonide-Formoterol Fumarate (SYMBICORT) 80-4.5 MCG/ACT Inhalation Aerosol 3 each 1     Sig: Inhale 2 puffs into the lungs 2 (two) times daily. albuterol (PROAIR HFA) 108 (90 Base) MCG/ACT Inhalation Aero Soln 1 each 1     Sig: Inhale 2 puffs into the lungs every 6 (six) hours as needed for Wheezing. levocetirizine 5 MG Oral Tab 90 tablet 1     Sig: Take 1 tablet (5 mg total) by mouth every evening. Imaging & Referrals:  None     12/2/2023  Willie Chi MD    If medication samples were provided today, they were provided solely for patient education and training related to self administration of these medications. Teaching, instruction and sample was provided to the patient by myself. Teaching included  a review of potential adverse side effects as well as potential efficacy. Patient's questions were answered in regards to medication administration and dosing and potential side effects.  Teaching was provided via the teach back method

## 2023-12-29 ENCOUNTER — OFFICE VISIT (OUTPATIENT)
Dept: ORTHOPEDICS CLINIC | Facility: CLINIC | Age: 73
End: 2023-12-29
Payer: MEDICARE

## 2023-12-29 VITALS
DIASTOLIC BLOOD PRESSURE: 79 MMHG | BODY MASS INDEX: 27.92 KG/M2 | SYSTOLIC BLOOD PRESSURE: 124 MMHG | HEART RATE: 77 BPM | HEIGHT: 58 IN | WEIGHT: 133 LBS

## 2023-12-29 DIAGNOSIS — M22.41 PATELLOFEMORAL CHONDROSIS OF RIGHT KNEE: ICD-10-CM

## 2023-12-29 DIAGNOSIS — M22.42 PATELLOFEMORAL CHONDROSIS OF LEFT KNEE: Primary | ICD-10-CM

## 2023-12-29 PROCEDURE — 99213 OFFICE O/P EST LOW 20 MIN: CPT | Performed by: ORTHOPAEDIC SURGERY

## 2023-12-29 PROCEDURE — 20610 DRAIN/INJ JOINT/BURSA W/O US: CPT | Performed by: ORTHOPAEDIC SURGERY

## 2023-12-29 RX ORDER — TRIAMCINOLONE ACETONIDE 40 MG/ML
40 INJECTION, SUSPENSION INTRA-ARTICULAR; INTRAMUSCULAR
Status: COMPLETED | OUTPATIENT
Start: 2023-12-29 | End: 2023-12-29

## 2023-12-29 RX ADMIN — TRIAMCINOLONE ACETONIDE 40 MG: 40 INJECTION, SUSPENSION INTRA-ARTICULAR; INTRAMUSCULAR at 12:10:00

## 2023-12-29 RX ADMIN — TRIAMCINOLONE ACETONIDE 40 MG: 40 INJECTION, SUSPENSION INTRA-ARTICULAR; INTRAMUSCULAR at 12:11:00

## 2023-12-29 NOTE — PROGRESS NOTES
Per verbal order from Dr. Guthrie draw up 3ml of 0.5% Marcaine & 2ml 1% lidocaine and 1ml of Kenalog 40 for cortisone injection to bilateral knee Sasha NOVOA MA    Patient provided education handout for cortisone injection.

## 2023-12-29 NOTE — PROGRESS NOTES
Subjective:   ANGELA Cha is a 73 year old female who presents for Knee Pain ( Bilateral knee - Pt states knee ain is constant -  Rates pain 10/10- no numbness baker tingling - Pt requesting bilateral cortisone injections)     ***  History/Other:    Chief Complaint Reviewed and Verified  Nursing Notes Reviewed and   Verified         Tobacco:  She has never smoked tobacco.    Current Outpatient Medications   Medication Sig Dispense Refill    ergocalciferol 1.25 MG (07363 UT) Oral Cap Take 1 capsule (50,000 Units total) by mouth once a week for 12 doses. 12 capsule 1    montelukast (SINGULAIR) 10 MG Oral Tab Take 1 tablet (10 mg total) by mouth nightly. 90 tablet 1    Budesonide-Formoterol Fumarate (SYMBICORT) 80-4.5 MCG/ACT Inhalation Aerosol Inhale 2 puffs into the lungs 2 (two) times daily. 3 each 1    albuterol (PROAIR HFA) 108 (90 Base) MCG/ACT Inhalation Aero Soln Inhale 2 puffs into the lungs every 6 (six) hours as needed for Wheezing. 1 each 1    levocetirizine 5 MG Oral Tab Take 1 tablet (5 mg total) by mouth every evening. 90 tablet 1    albuterol (VENTOLIN HFA) 108 (90 Base) MCG/ACT Inhalation Aero Soln INHALE 2 PUFFS INTO THE LUNGS EVERY 4 TO 6 HOURS AS NEEDED FOR WHEEZING 18 g 0    traMADol 50 MG Oral Tab Take 1 tablet (50 mg total) by mouth every evening. 2 tablet 0    cyclobenzaprine 10 MG Oral Tab Take 1 tablet (10 mg total) by mouth 2 (two) times daily as needed for Muscle spasms. 30 tablet 1    Azelastine HCl 137 MCG/SPRAY Nasal Solution SPRAY 2 SPRAYS BY NASAL ROUTE DAILY 1 each 2    levothyroxine 75 MCG Oral Tab Take 1 tablet (75 mcg total) by mouth before breakfast. 90 tablet 3    clotrimazole 1 % External Cream APPLY TO RASH ON THE LOWER CHEST AND LOWER ABDOMEN ONCE DAILY INDEFINITELY GENTLY SMALL AMOUNTS      Mometasone Furoate 0.1 % External Cream 1 ring. every 28 days. FOR 21 DAYS IN, THEN REMOVE FOR 7 DAYS      Etravirine (INTELENCE) 200 MG Oral Tab Take 1 tablet by mouth 2 (two) times  a day. 180 tablet 0         Review of Systems:  {ROS options- DEL to delete:29927::\"Pertinent items are noted in HPI.\"}  ***    Objective:   /79   Pulse 77   Ht 4' 10\" (1.473 m)   Wt 133 lb   LMP  (LMP Unknown)   BMI 27.80 kg/m²  Estimated body mass index is 27.8 kg/m² as calculated from the following:    Height as of this encounter: 4' 10\" (1.473 m).    Weight as of this encounter: 133 lb.  {Physical Exam options:83321}  ***    Assessment & Plan:   There are no diagnoses linked to this encounter.    {Tip  Follow Up:8307}  No follow-ups on file.    Sasha NOVOA MA, 12/29/2023, 11:34 AM

## 2023-12-29 NOTE — PROGRESS NOTES
NURSING INTAKE COMMENTS:   Chief Complaint   Patient presents with    Knee Pain      Bilateral knee - Pt states knee ain is constant -  Rates pain 10/10- no numbness baker tingling - Pt requesting bilateral cortisone injections       HPI: This 73 year old female presents today with complaints of bilateral knee she is here for injections in both knees.  She has had no recent injury to the knees.  She has had ongoing pain mainly over the anteromedial knee.  No recent immunizations.    Past Medical History:   Diagnosis Date    Allergic rhinitis     Anxiety state     occasional, no medication    Asthma     Disorder of thyroid     hypothyroidism    History of blood transfusion     [facility]    HIV (human immunodeficiency virus infection) ([facility])     HIV (human immunodeficiency virus infection) ([facility])     Hypothyroidism     Internal hemorrhoids 10/20/2017    Tinnitus     Visual impairment     right eye, glasses     Past Surgical History:   Procedure Laterality Date          CATARACT      surgery     COLONOSCOPY N/A 10/20/2017    Procedure: COLONOSCOPY;  Surgeon: Rosalinda Victor MD;  Location: [facility] ENDOSCOPY    EGD  2018        EYE SURGERY Right      Current Outpatient Medications   Medication Sig Dispense Refill    ergocalciferol 1.25 MG (92155 UT) Oral Cap Take 1 capsule (50,000 Units total) by mouth once a week for 12 doses. 12 capsule 1    montelukast (SINGULAIR) 10 MG Oral Tab Take 1 tablet (10 mg total) by mouth nightly. 90 tablet 1    Budesonide-Formoterol Fumarate (SYMBICORT) 80-4.5 MCG/ACT Inhalation Aerosol Inhale 2 puffs into the lungs 2 (two) times daily. 3 each 1    albuterol (PROAIR HFA) 108 (90 Base) MCG/ACT Inhalation Aero Soln Inhale 2 puffs into the lungs every 6 (six) hours as needed for Wheezing. 1 each 1    levocetirizine 5 MG Oral Tab Take 1 tablet (5 mg total) by mouth every evening. 90 tablet 1    albuterol (VENTOLIN HFA) 108 (90 Base) MCG/ACT  Inhalation Aero Soln INHALE 2 PUFFS INTO THE LUNGS EVERY 4 TO 6 HOURS AS NEEDED FOR WHEEZING 18 g 0    traMADol 50 MG Oral Tab Take 1 tablet (50 mg total) by mouth every evening. 2 tablet 0    cyclobenzaprine 10 MG Oral Tab Take 1 tablet (10 mg total) by mouth 2 (two) times daily as needed for Muscle spasms. 30 tablet 1    Azelastine HCl 137 MCG/SPRAY Nasal Solution SPRAY 2 SPRAYS BY NASAL ROUTE DAILY 1 each 2    levothyroxine 75 MCG Oral Tab Take 1 tablet (75 mcg total) by mouth before breakfast. 90 tablet 3    clotrimazole 1 % External Cream APPLY TO RASH ON THE LOWER CHEST AND LOWER ABDOMEN ONCE DAILY INDEFINITELY GENTLY SMALL AMOUNTS      Mometasone Furoate 0.1 % External Cream 1 ring. every 28 days. FOR 21 DAYS IN, THEN REMOVE FOR 7 DAYS      Etravirine (INTELENCE) 200 MG Oral Tab Take 1 tablet by mouth 2 (two) times a day. 180 tablet 0     Allergies   Allergen Reactions    Dust Mite Extract ITCHING     Family History   Problem Relation Age of Onset    Hypertension Father     Diabetes Sister     Other (cervical cancer) Maternal Cousin Male         pre josefa       Social History     Occupational History    Not on file   Tobacco Use    Smoking status: Never    Smokeless tobacco: Never    Tobacco comments:     Pt denies passive smoke exposure in her home.    Vaping Use    Vaping Use: Never used   Substance and Sexual Activity    Alcohol use: Not Currently     Comment: occasional wine    Drug use: No    Sexual activity: Not on file        Review of Systems:  GENERAL: denies fevers, chills, night sweats, fatigue, unintentional weight loss/gain  SKIN: denies skin lesions, open sores, rash  HEENT:denies recent vision change, new nasal congestion,hearing loss, tinnitus, sore throat, headaches  RESPIRATORY: denies new shortness of breath, cough, asthma, wheezing  CARDIOVASCULAR: denies chest pain, leg cramps with exertion, palpitations, leg swelling  GI: denies abdominal pain, nausea, vomiting, diarrhea, constipation,  hematochezia, worsening heartburn or stomach ulcers  : denies dysuria, hematuria, incontinence, increased frequency, urgency, difficulty urinating  MUSCULOSKELETAL: denies musculoskeletal complaints other than in HPI  NEURO: denies numbness, tingling, weakness, balance issues, dizziness, memory loss  PSYCHIATRIC: denies Hx of depression, anxiety, other psychiatric disorders  HEMATOLOGIC: denies blood clots, anemia, blood clotting disorders, blood transfusion  ENDOCRINE: denies autoimmune disease, thyroid issues, or diabetes  ALLERGY: denies asthma, seasonal allergies    Physical Examination:    /79   Pulse 77   Ht 4' 10\" (1.473 m)   Wt 133 lb (60.3 kg)   LMP  (LMP Unknown)   BMI 27.80 kg/m²   Constitutional: appears well hydrated, alert and responsive, no acute distress noted  Extremities: Bilateral knee examination unchanged.  Neurological: Unchanged    Imaging:   No results found.     Labs:  Lab Results   Component Value Date    WBC 4.5 12/01/2023    HGB 13.7 12/01/2023    .0 12/01/2023      Lab Results   Component Value Date    GLU 90 12/01/2023    BUN 11 12/01/2023    CREATSERUM 0.58 12/01/2023    GFR 93 01/06/2017    GFRNAA 89 05/10/2022    GFRAA 102 05/10/2022        Assessment and Plan:  Diagnoses and all orders for this visit:    Patellofemoral chondrosis of left knee  -     arthrocentesis major joint  -     triamcinolone acetonide (Kenalog-40) 40 MG/ML injection 40 mg    Patellofemoral chondrosis of right knee  -     arthrocentesis major joint  -     triamcinolone acetonide (Kenalog-40) 40 MG/ML injection 40 mg        Assessment: Bilateral knee osteoarthritis, primary    Plan: Using sterile technique and a superolateral parapatellar approach, both knees were injected with 40 mg of Kenalog.  Advised icing, oral anti-inflammatories, activity modifications and home exercises.  Follow-up again as needed.    Follow Up: Return if symptoms worsen or fail to improve.    GAMALIEL HENRIQUEZ MD

## 2024-02-01 RX ORDER — ALBUTEROL SULFATE 90 UG/1
2 AEROSOL, METERED RESPIRATORY (INHALATION) EVERY 6 HOURS PRN
Qty: 6.7 EACH | Refills: 1 | OUTPATIENT
Start: 2024-02-01

## 2024-02-01 NOTE — TELEPHONE ENCOUNTER
Refill requested for: Albuterol HFA     Last office visit: 12/02/2023    Previously advised to follow up in: Timeline not listed in office note--pt on Symbicort daily    F/U currently scheduled? No  Appointment with cancellation or no show? No    Date of last refill: 12/02/2023    ACTION: Called to triage asthma symptoms per protocol      RN spoke with pt.  Pt reports that her asthma is well controlled for the most part.  However has been needing to use albuterol more in the last few days which she feels may be due to weather changes.  Denies needing to use more often than every 4 hours and reports getting good relief when using.    Denies any current SOB, wheezing or chest tightness.    Pt denies needing albuterol refill at this time and did not request refill--pharmacy on auto refill request.  Refill request denied at this time.   Pt to contact our office when needs refill.    Routed to Dr. Young as RAYMOND.

## 2024-05-16 ENCOUNTER — HOSPITAL ENCOUNTER (OUTPATIENT)
Dept: GENERAL RADIOLOGY | Facility: HOSPITAL | Age: 74
Discharge: HOME | End: 2024-05-16
Attending: ORTHOPAEDIC SURGERY

## 2024-05-16 ENCOUNTER — OFFICE VISIT (OUTPATIENT)
Dept: ORTHOPEDICS CLINIC | Facility: CLINIC | Age: 74
End: 2024-05-16

## 2024-05-16 VITALS — HEART RATE: 77 BPM | DIASTOLIC BLOOD PRESSURE: 85 MMHG | SYSTOLIC BLOOD PRESSURE: 156 MMHG

## 2024-05-16 DIAGNOSIS — Z47.89 ORTHOPEDIC AFTERCARE: ICD-10-CM

## 2024-05-16 DIAGNOSIS — M22.41 PATELLOFEMORAL CHONDROSIS OF RIGHT KNEE: Primary | ICD-10-CM

## 2024-05-16 DIAGNOSIS — M22.42 PATELLOFEMORAL CHONDROSIS OF LEFT KNEE: ICD-10-CM

## 2024-05-16 PROCEDURE — 73564 X-RAY EXAM KNEE 4 OR MORE: CPT | Performed by: ORTHOPAEDIC SURGERY

## 2024-05-16 RX ORDER — TRIAMCINOLONE ACETONIDE 40 MG/ML
40 INJECTION, SUSPENSION INTRA-ARTICULAR; INTRAMUSCULAR 2 TIMES DAILY
Status: COMPLETED | OUTPATIENT
Start: 2024-05-16 | End: 2024-05-16

## 2024-05-16 RX ADMIN — TRIAMCINOLONE ACETONIDE 40 MG: 40 INJECTION, SUSPENSION INTRA-ARTICULAR; INTRAMUSCULAR at 17:08:00

## 2024-05-16 RX ADMIN — TRIAMCINOLONE ACETONIDE 40 MG: 40 INJECTION, SUSPENSION INTRA-ARTICULAR; INTRAMUSCULAR at 17:07:00

## 2024-05-16 NOTE — PROGRESS NOTES
Per verbal order from Dr. Guthrie/MEENAKSHI Jean-Baptiste  draw up 3ml of 0.5% Marcaine & 2ml 1% lidocaine and 1ml of Kenalog 40 for cortisone injection to bilateral knee. Marsiol Nuñez    Patient provided education handout for cortisone injection.

## 2024-05-16 NOTE — PROGRESS NOTES
NURSING INTAKE COMMENTS:   Chief Complaint   Patient presents with    Follow - Up     Bilateral knee pain, had cortisone injections at last visit, 10/10 pain scale       HPI: This 74 year old female presents today with complaints of bilateral knee pain follow-up.  She had no recent injury and no recent vaccination.  No fevers or chills.  She has had recurrent pain over the last few weeks mainly over the anterior knees.  She has some discomfort on the stairs.  She had an injection back in December which helped significantly.  She had both knees injected.  She has not done any physical therapy as she is does not feel this would help.    Past Medical History:    Allergic rhinitis    Anxiety state    occasional, no medication    Asthma ([facility]ly [facility])    Disorder of thyroid    hypothyroidism    History of blood transfusion    [facility]    HIV (human immunodeficiency virus infection) (Formerly [facility])    HIV (human immunodeficiency virus infection) (Formerly [facility])    Hypothyroidism    Internal hemorrhoids    Tinnitus    Visual impairment    right eye, glasses     Past Surgical History:   Procedure Laterality Date          Cataract      surgery     Colonoscopy N/A 10/20/2017    Procedure: COLONOSCOPY;  Surgeon: Rosalinda Victor MD;  Location: [facility] ENDOSCOPY    Egd  2018        Eye surgery Right      Current Outpatient Medications   Medication Sig Dispense Refill    Budesonide-Formoterol Fumarate (SYMBICORT) 80-4.5 MCG/ACT Inhalation Aerosol Inhale 2 puffs into the lungs 2 (two) times daily. 3 each 1    albuterol (PROAIR HFA) 108 (90 Base) MCG/ACT Inhalation Aero Soln Inhale 2 puffs into the lungs every 6 (six) hours as needed for Wheezing. 1 each 1    levocetirizine 5 MG Oral Tab Take 1 tablet (5 mg total) by mouth every evening. 90 tablet 1    albuterol (VENTOLIN HFA) 108 (90 Base) MCG/ACT Inhalation Aero Soln INHALE 2 PUFFS INTO THE LUNGS EVERY 4 TO 6 HOURS AS NEEDED FOR WHEEZING 18 g 0    Azelastine HCl  137 MCG/SPRAY Nasal Solution SPRAY 2 SPRAYS BY NASAL ROUTE DAILY 1 each 2    levothyroxine 75 MCG Oral Tab Take 1 tablet (75 mcg total) by mouth before breakfast. 90 tablet 3    clotrimazole 1 % External Cream APPLY TO RASH ON THE LOWER CHEST AND LOWER ABDOMEN ONCE DAILY INDEFINITELY GENTLY SMALL AMOUNTS      Mometasone Furoate 0.1 % External Cream 1 ring. every 28 days. FOR 21 DAYS IN, THEN REMOVE FOR 7 DAYS      montelukast (SINGULAIR) 10 MG Oral Tab Take 1 tablet (10 mg total) by mouth nightly. 90 tablet 1    traMADol 50 MG Oral Tab Take 1 tablet (50 mg total) by mouth every evening. 2 tablet 0    cyclobenzaprine 10 MG Oral Tab Take 1 tablet (10 mg total) by mouth 2 (two) times daily as needed for Muscle spasms. 30 tablet 1    Etravirine (INTELENCE) 200 MG Oral Tab Take 1 tablet by mouth 2 (two) times a day. 180 tablet 0     Allergies   Allergen Reactions    Dust Mite Extract ITCHING     Family History   Problem Relation Age of Onset    Hypertension Father     Diabetes Sister     Other (cervical cancer) Maternal Cousin Male         pre josefa       Social History     Occupational History    Not on file   Tobacco Use    Smoking status: Never    Smokeless tobacco: Never    Tobacco comments:     Pt denies passive smoke exposure in her home.    Vaping Use    Vaping status: Never Used   Substance and Sexual Activity    Alcohol use: Not Currently     Comment: occasional wine    Drug use: No    Sexual activity: Not on file        Review of Systems:  GENERAL: denies fevers, chills, night sweats, fatigue, unintentional weight loss/gain  SKIN: denies skin lesions, open sores, rash  HEENT:denies recent vision change, new nasal congestion,hearing loss, tinnitus, sore throat, headaches  RESPIRATORY: denies new shortness of breath, cough, asthma, wheezing  CARDIOVASCULAR: denies chest pain, leg cramps with exertion, palpitations, leg swelling  GI: denies abdominal pain, nausea, vomiting, diarrhea, constipation, hematochezia,  worsening heartburn or stomach ulcers  : denies dysuria, hematuria, incontinence, increased frequency, urgency, difficulty urinating  MUSCULOSKELETAL: denies musculoskeletal complaints other than in HPI  NEURO: denies numbness, tingling, weakness, balance issues, dizziness, memory loss  PSYCHIATRIC: denies Hx of depression, anxiety, other psychiatric disorders  HEMATOLOGIC: denies blood clots, anemia, blood clotting disorders, blood transfusion  ENDOCRINE: denies autoimmune disease, thyroid issues, or diabetes  ALLERGY: denies asthma, seasonal allergies    Physical Examination:    /85 (BP Location: Right arm, Patient Position: Sitting, Cuff Size: adult)   Pulse 77   LMP  (LMP Unknown)   Constitutional: appears well hydrated, alert and responsive, no acute distress noted  Extremities: Bilateral knees with mild patellofemoral crepitus.  No effusion in either knee.  Range of motion 0 to 120 degrees bilaterally.  Mild atrophy bilateral thighs.  Normal ligament stability both knees.  Neurological: Unchanged    Imaging:   X-rays of both knees show no significant degenerative changes.  No soft tissue calcification or fracture.    Labs:  Lab Results   Component Value Date    WBC 6.5 02/23/2024    HGB 12.7 02/23/2024    .0 02/23/2024      Lab Results   Component Value Date     (H) 02/23/2024    BUN 13 02/23/2024    CREATSERUM 0.65 02/23/2024    GFR 93 01/06/2017    GFRNAA 89 05/10/2022    GFRAA 102 05/10/2022        Assessment and Plan:  Diagnoses and all orders for this visit:    Patellofemoral chondrosis of right knee  -     arthrocentesis major joint  -     triamcinolone acetonide (Kenalog-40) 40 MG/ML injection 40 mg    Orthopedic aftercare  -     Cancel: XR KNEE (3 VIEWS) AP LAT OBL BILAT EM (CPT=73562-50); Future    Patellofemoral chondrosis of left knee  -     arthrocentesis major joint  -     triamcinolone acetonide (Kenalog-40) 40 MG/ML injection 40 mg        Assessment: Bilateral knee  patellofemoral chondrosis    Plan: I advised continued nonoperative care of her problem.  She requested repeat injections today.  Both knees were reinjected with 40 mg of Kenalog using a superolateral parapatellar approach.  Advised strengthening exercises for the quadriceps and terminal extension.  If symptoms persist beyond the next 4 to 6 weeks, consider serologic testing for autoimmune and inflammatory conditions.  Follow-up otherwise in 6 to 8 weeks as needed.  Provided written instructions on home exercises for the knee.    Follow Up: Return in about 6 weeks (around 6/27/2024).    GAMALIEL HENRIQUEZ MD   (V5) oriented

## 2024-05-17 NOTE — TELEPHONE ENCOUNTER
A refill request was received for:  Requested Prescriptions     Pending Prescriptions Disp Refills    ERGOCALCIFEROL 1.25 MG (43682 UT) Oral Cap [Pharmacy Med Name: VITAMIN D2 1.25MG(50,000 UNIT)] 12 capsule 1     Sig: TAKE 1 CAPSULE (50,000 UNITS TOTAL) BY MOUTH ONCE A WEEK FOR 12 DOSES     Last refill date:  12/2/23    Last office visit: 11/21/23      No future appointments.

## 2024-05-19 RX ORDER — ERGOCALCIFEROL 1.25 MG/1
50000 CAPSULE ORAL WEEKLY
Qty: 12 CAPSULE | Refills: 1 | Status: SHIPPED | OUTPATIENT
Start: 2024-05-19 | End: 2024-08-05

## 2024-05-29 ENCOUNTER — TELEMEDICINE (OUTPATIENT)
Dept: INTERNAL MEDICINE CLINIC | Facility: CLINIC | Age: 74
End: 2024-05-29

## 2024-05-29 DIAGNOSIS — B20 HUMAN IMMUNODEFICIENCY VIRUS (HIV) DISEASE (HCC): ICD-10-CM

## 2024-05-29 DIAGNOSIS — R53.83 OTHER FATIGUE: ICD-10-CM

## 2024-05-29 DIAGNOSIS — L65.9 HAIR LOSS: ICD-10-CM

## 2024-05-29 DIAGNOSIS — R63.4 UNINTENTIONAL WEIGHT LOSS: ICD-10-CM

## 2024-05-29 DIAGNOSIS — E03.9 HYPOTHYROIDISM, UNSPECIFIED TYPE: ICD-10-CM

## 2024-05-29 DIAGNOSIS — K92.1 BLACK STOOL: Primary | ICD-10-CM

## 2024-05-29 PROCEDURE — 1160F RVW MEDS BY RX/DR IN RCRD: CPT | Performed by: INTERNAL MEDICINE

## 2024-05-29 PROCEDURE — 99214 OFFICE O/P EST MOD 30 MIN: CPT | Performed by: INTERNAL MEDICINE

## 2024-05-29 PROCEDURE — 1159F MED LIST DOCD IN RCRD: CPT | Performed by: INTERNAL MEDICINE

## 2024-05-29 NOTE — PROGRESS NOTES
This visit was conducted using telemedicine with live interactive video and audio   Patient verbally consents to conduct video visit   Patient understands and accepts financial responsibility for any deductible, co-insurance and/or co-pays associated with this service.            ANGELA Cha is a 74 year old female.    CC: fatigue, weight loss , black stool   HPI:Patient with PMH as listed below scheduled a visit for :    Fatigue   Has been losing hair   Has been going on for the last several week   Weight loss started 1 week ago   Current weight 127   Wt Readings from Last 6 Encounters:   23 133 lb (60.3 kg)   23 139 lb 6.4 oz (63.2 kg)   10/04/23 134 lb (60.8 kg)   08/10/23 135 lb (61.2 kg)   23 137 lb (62.1 kg)   22 134 lb (60.8 kg)      She has good appetite   No diarrhea or constipation   Black stool x 3 days last week then went back to normal   Occasional abdominal pain   No vomiting   No urinary symptoms   No chest pain   No sob       The thyroid medication   HIV meds   On vitamin d         HIV   Seeing ID   On meds  Last blood work was in           Past Medical History:    Allergic rhinitis    Anxiety state    occasional, no medication    Asthma ([facility])    Disorder of thyroid    hypothyroidism    History of blood transfusion    [facility]    HIV (human immunodeficiency virus infection) (HCC)    HIV (human immunodeficiency virus infection) ([facility])    Hypothyroidism    Internal hemorrhoids    Tinnitus    Visual impairment    right eye, glasses      Past Surgical History:   Procedure Laterality Date          Cataract      surgery     Colonoscopy N/A 10/20/2017    Procedure: COLONOSCOPY;  Surgeon: Rosalinda Victor MD;  Location: [facility] ENDOSCOPY    Egd  2018        Eye surgery Right       Current Outpatient Medications   Medication Sig Dispense Refill    ergocalciferol 1.25 MG (62604 UT) Oral Cap TAKE 1 CAPSULE (50,000 UNITS TOTAL) BY  MOUTH ONCE A WEEK FOR 12 DOSES 12 capsule 1    montelukast (SINGULAIR) 10 MG Oral Tab Take 1 tablet (10 mg total) by mouth nightly. 90 tablet 1    Budesonide-Formoterol Fumarate (SYMBICORT) 80-4.5 MCG/ACT Inhalation Aerosol Inhale 2 puffs into the lungs 2 (two) times daily. 3 each 1    albuterol (PROAIR HFA) 108 (90 Base) MCG/ACT Inhalation Aero Soln Inhale 2 puffs into the lungs every 6 (six) hours as needed for Wheezing. 1 each 1    levocetirizine 5 MG Oral Tab Take 1 tablet (5 mg total) by mouth every evening. 90 tablet 1    albuterol (VENTOLIN HFA) 108 (90 Base) MCG/ACT Inhalation Aero Soln INHALE 2 PUFFS INTO THE LUNGS EVERY 4 TO 6 HOURS AS NEEDED FOR WHEEZING 18 g 0    traMADol 50 MG Oral Tab Take 1 tablet (50 mg total) by mouth every evening. 2 tablet 0    cyclobenzaprine 10 MG Oral Tab Take 1 tablet (10 mg total) by mouth 2 (two) times daily as needed for Muscle spasms. 30 tablet 1    Azelastine HCl 137 MCG/SPRAY Nasal Solution SPRAY 2 SPRAYS BY NASAL ROUTE DAILY 1 each 2    levothyroxine 75 MCG Oral Tab Take 1 tablet (75 mcg total) by mouth before breakfast. 90 tablet 3    clotrimazole 1 % External Cream APPLY TO RASH ON THE LOWER CHEST AND LOWER ABDOMEN ONCE DAILY INDEFINITELY GENTLY SMALL AMOUNTS      Mometasone Furoate 0.1 % External Cream 1 ring. every 28 days. FOR 21 DAYS IN, THEN REMOVE FOR 7 DAYS      Etravirine (INTELENCE) 200 MG Oral Tab Take 1 tablet by mouth 2 (two) times a day. 180 tablet 0     Patient Active Problem List   Diagnosis    Human immunodeficiency virus (HIV) disease (HCC)    Hypothyroidism, unspecified type     Family History   Problem Relation Age of Onset    Hypertension Father     Diabetes Sister     Other (cervical cancer) Maternal Cousin Male         pre josefa         REVIEW OF SYSTEMS:    A comprehensive 10 point review of systems was completed.  Pertinent positives and negatives noted in the the HPI.     EXAM was conducted through video     General: She is not in acute  distress, normal appearance, not ill appearing  Pulmonary/ chest:  Speaking in full sentences, no increased work of breathing  Psychiatric: Behavior is normal, normal affect  Skin: No visible lesions  Extremities: no obvious extremity swelling noted       ASSESSMENT AND PLAN:  ANGELA Cha is a 74 year old female.    ICD-10-CM    1. Black stool  K92.1 Gastro Referral - In Network     CBC With Differential With Platelet     Comp Metabolic Panel (14)     Lipid Panel     Hemoglobin A1C     HCV Antibody     Folic Acid Serum (Folate)     Ferritin     Iron And Tibc     Vitamin D     Vitamin B12     TSH W Reflex To Free T4     Cortisol [E]     Sed Rate, Westergren (Automated)     C-Reactive Protein [E]      2. Unintentional weight loss  R63.4 Gastro Referral - In Network     CBC With Differential With Platelet     Comp Metabolic Panel (14)     Lipid Panel     Hemoglobin A1C     HCV Antibody     Folic Acid Serum (Folate)     Ferritin     Iron And Tibc     Vitamin D     Vitamin B12     TSH W Reflex To Free T4     Cortisol [E]     Sed Rate, Westergren (Automated)     C-Reactive Protein [E]      3. Hair loss  L65.9 Gastro Referral - In Network     CBC With Differential With Platelet     Comp Metabolic Panel (14)     Lipid Panel     Hemoglobin A1C     HCV Antibody     Folic Acid Serum (Folate)     Ferritin     Iron And Tibc     Vitamin D     Vitamin B12     TSH W Reflex To Free T4     Cortisol [E]     Sed Rate, Westergren (Automated)     C-Reactive Protein [E]      4. Other fatigue  R53.83 Gastro Referral - In Network     CBC With Differential With Platelet     Comp Metabolic Panel (14)     Lipid Panel     Hemoglobin A1C     HCV Antibody     Folic Acid Serum (Folate)     Ferritin     Iron And Tibc     Vitamin D     Vitamin B12     TSH W Reflex To Free T4     Cortisol [E]     Sed Rate, Westergren (Automated)     C-Reactive Protein [E]      5. Hypothyroidism, unspecified type  E03.9 Gastro Referral - In Network     CBC With  Differential With Platelet     Comp Metabolic Panel (14)     Lipid Panel     Hemoglobin A1C     HCV Antibody     Folic Acid Serum (Folate)     Ferritin     Iron And Tibc     Vitamin D     Vitamin B12     TSH W Reflex To Free T4     Cortisol [E]     Sed Rate, Westergren (Automated)     C-Reactive Protein [E]      6. Human immunodeficiency virus (HIV) disease (HCC)  B20 Gastro Referral - In Network     CBC With Differential With Platelet     Comp Metabolic Panel (14)     Lipid Panel     Hemoglobin A1C     HCV Antibody     Folic Acid Serum (Folate)     Ferritin     Iron And Tibc     Vitamin D     Vitamin B12     TSH W Reflex To Free T4     Cortisol [E]     Sed Rate, Westergren (Automated)     C-Reactive Protein [E]                 Advised  to monitor the weight  Check blood work CBC, CMP, TSH, iron level, ferritin, TSH, cortisol, vitamin D, hepatitis C  Advised to see the gastroenterologist to evaluate black stool and unintentional weight loss  Follow-up in 4 weeks to reevaluate weight loss if continues to have a CT scan of the chest abdomen and pelvis  Discussed alarming signs of any to go to the emergency room    Orders Placed This Encounter   Procedures    CBC With Differential With Platelet    Comp Metabolic Panel (14)    Lipid Panel    Hemoglobin A1C    HCV Antibody    Folic Acid Serum (Folate)    Ferritin    Iron And Tibc    Vitamin D    Vitamin B12    TSH W Reflex To Free T4    Cortisol [E]    Sed Rate, Westergren (Automated)    C-Reactive Protein [E]     Meds & Refills for this Visit:  Requested Prescriptions      No prescriptions requested or ordered in this encounter     Imaging & Consults:  GASTRO - INTERNAL          Please note that the following visit was completed using two-way, real-time interactive audio and video communication. This has been done in good rashi to provide continuity of care in the best interest of the provider-patient relationship, due to the ongoing public health crises/ national  emergency  due to COVID 19 and because of restrictions of visitation. Every conscious effort was taken to allow for sufficient and adequate time.         Included in this visit, time may have been spent reviewing labs, medications, radiology tests and decision making. Appropriate medical decision-making and tests are ordered as detailed in the plan of care above.  Coding/billing information is submitted for this visit based on complexity of care and/or time spent for the visit.

## 2024-05-31 ENCOUNTER — LAB ENCOUNTER (OUTPATIENT)
Dept: LAB | Age: 74
End: 2024-05-31
Attending: INTERNAL MEDICINE
Payer: MEDICARE

## 2024-05-31 DIAGNOSIS — B20 HUMAN IMMUNODEFICIENCY VIRUS (HIV) DISEASE (HCC): ICD-10-CM

## 2024-05-31 DIAGNOSIS — R63.4 UNINTENTIONAL WEIGHT LOSS: ICD-10-CM

## 2024-05-31 DIAGNOSIS — K92.1 BLACK STOOL: ICD-10-CM

## 2024-05-31 DIAGNOSIS — L65.9 HAIR LOSS: ICD-10-CM

## 2024-05-31 DIAGNOSIS — R53.83 OTHER FATIGUE: ICD-10-CM

## 2024-05-31 DIAGNOSIS — E03.9 HYPOTHYROIDISM, UNSPECIFIED TYPE: ICD-10-CM

## 2024-05-31 LAB
ALBUMIN SERPL-MCNC: 4.1 G/DL (ref 3.2–4.8)
ALBUMIN/GLOB SERPL: 2 {RATIO} (ref 1–2)
ALP LIVER SERPL-CCNC: 77 U/L
ALT SERPL-CCNC: 18 U/L
ANION GAP SERPL CALC-SCNC: 6 MMOL/L (ref 0–18)
AST SERPL-CCNC: 15 U/L (ref ?–34)
BASOPHILS # BLD AUTO: 0.04 X10(3) UL (ref 0–0.2)
BASOPHILS NFR BLD AUTO: 0.5 %
BILIRUB SERPL-MCNC: 0.4 MG/DL (ref 0.2–1.1)
BUN BLD-MCNC: 12 MG/DL (ref 9–23)
BUN/CREAT SERPL: 17.4 (ref 10–20)
CALCIUM BLD-MCNC: 9.4 MG/DL (ref 8.7–10.4)
CHLORIDE SERPL-SCNC: 109 MMOL/L (ref 98–112)
CHOLEST SERPL-MCNC: 256 MG/DL (ref ?–200)
CO2 SERPL-SCNC: 29 MMOL/L (ref 21–32)
CORTIS SERPL-MCNC: 11 UG/DL
CREAT BLD-MCNC: 0.69 MG/DL
CRP SERPL-MCNC: <0.4 MG/DL (ref ?–1)
DEPRECATED HBV CORE AB SER IA-ACNC: 36.2 NG/ML
DEPRECATED RDW RBC AUTO: 37.9 FL (ref 35.1–46.3)
EGFRCR SERPLBLD CKD-EPI 2021: 92 ML/MIN/1.73M2 (ref 60–?)
EOSINOPHIL # BLD AUTO: 0.03 X10(3) UL (ref 0–0.7)
EOSINOPHIL NFR BLD AUTO: 0.4 %
ERYTHROCYTE [DISTWIDTH] IN BLOOD BY AUTOMATED COUNT: 11.8 % (ref 11–15)
ERYTHROCYTE [SEDIMENTATION RATE] IN BLOOD: 16 MM/HR
EST. AVERAGE GLUCOSE BLD GHB EST-MCNC: 126 MG/DL (ref 68–126)
FASTING PATIENT LIPID ANSWER: YES
FASTING STATUS PATIENT QL REPORTED: YES
FOLATE SERPL-MCNC: 21 NG/ML (ref 5.4–?)
GLOBULIN PLAS-MCNC: 2.1 G/DL (ref 2–3.5)
GLUCOSE BLD-MCNC: 84 MG/DL (ref 70–99)
HBA1C MFR BLD: 6 % (ref ?–5.7)
HCT VFR BLD AUTO: 41.6 %
HCV AB SERPL QL IA: NONREACTIVE
HDLC SERPL-MCNC: 64 MG/DL (ref 40–59)
HGB BLD-MCNC: 14 G/DL
IMM GRANULOCYTES # BLD AUTO: 0.09 X10(3) UL (ref 0–1)
IMM GRANULOCYTES NFR BLD: 1.1 %
IRON SATN MFR SERPL: 15 %
IRON SERPL-MCNC: 48 UG/DL
LDLC SERPL CALC-MCNC: 160 MG/DL (ref ?–100)
LYMPHOCYTES # BLD AUTO: 1.71 X10(3) UL (ref 1–4)
LYMPHOCYTES NFR BLD AUTO: 21.7 %
MCH RBC QN AUTO: 29.6 PG (ref 26–34)
MCHC RBC AUTO-ENTMCNC: 33.7 G/DL (ref 31–37)
MCV RBC AUTO: 87.9 FL
MONOCYTES # BLD AUTO: 1.01 X10(3) UL (ref 0.1–1)
MONOCYTES NFR BLD AUTO: 12.8 %
NEUTROPHILS # BLD AUTO: 5 X10 (3) UL (ref 1.5–7.7)
NEUTROPHILS # BLD AUTO: 5 X10(3) UL (ref 1.5–7.7)
NEUTROPHILS NFR BLD AUTO: 63.5 %
NONHDLC SERPL-MCNC: 192 MG/DL (ref ?–130)
OSMOLALITY SERPL CALC.SUM OF ELEC: 297 MOSM/KG (ref 275–295)
PLATELET # BLD AUTO: 153 10(3)UL (ref 150–450)
POTASSIUM SERPL-SCNC: 3.9 MMOL/L (ref 3.5–5.1)
PROT SERPL-MCNC: 6.2 G/DL (ref 5.7–8.2)
RBC # BLD AUTO: 4.73 X10(6)UL
SODIUM SERPL-SCNC: 144 MMOL/L (ref 136–145)
TIBC SERPL-MCNC: 316 UG/DL (ref 250–425)
TRANSFERRIN SERPL-MCNC: 212 MG/DL (ref 250–380)
TRIGL SERPL-MCNC: 175 MG/DL (ref 30–149)
TSI SER-ACNC: 1.8 MIU/ML (ref 0.55–4.78)
VIT B12 SERPL-MCNC: 296 PG/ML (ref 211–911)
VIT D+METAB SERPL-MCNC: 56.9 NG/ML (ref 30–100)
VLDLC SERPL CALC-MCNC: 34 MG/DL (ref 0–30)
WBC # BLD AUTO: 7.9 X10(3) UL (ref 4–11)

## 2024-05-31 PROCEDURE — 86140 C-REACTIVE PROTEIN: CPT

## 2024-05-31 PROCEDURE — 82746 ASSAY OF FOLIC ACID SERUM: CPT

## 2024-05-31 PROCEDURE — 85652 RBC SED RATE AUTOMATED: CPT

## 2024-05-31 PROCEDURE — 85025 COMPLETE CBC W/AUTO DIFF WBC: CPT

## 2024-05-31 PROCEDURE — 82306 VITAMIN D 25 HYDROXY: CPT

## 2024-05-31 PROCEDURE — 80053 COMPREHEN METABOLIC PANEL: CPT

## 2024-05-31 PROCEDURE — 83690 ASSAY OF LIPASE: CPT

## 2024-05-31 PROCEDURE — 82607 VITAMIN B-12: CPT

## 2024-05-31 PROCEDURE — 82728 ASSAY OF FERRITIN: CPT

## 2024-05-31 PROCEDURE — 83036 HEMOGLOBIN GLYCOSYLATED A1C: CPT

## 2024-05-31 PROCEDURE — 86803 HEPATITIS C AB TEST: CPT | Performed by: INTERNAL MEDICINE

## 2024-05-31 PROCEDURE — 84466 ASSAY OF TRANSFERRIN: CPT

## 2024-05-31 PROCEDURE — 36415 COLL VENOUS BLD VENIPUNCTURE: CPT

## 2024-05-31 PROCEDURE — 80061 LIPID PANEL: CPT

## 2024-05-31 PROCEDURE — 84443 ASSAY THYROID STIM HORMONE: CPT

## 2024-05-31 PROCEDURE — 82533 TOTAL CORTISOL: CPT

## 2024-05-31 PROCEDURE — 82150 ASSAY OF AMYLASE: CPT

## 2024-05-31 PROCEDURE — 83540 ASSAY OF IRON: CPT

## 2024-06-02 DIAGNOSIS — R74.8 ELEVATED LIPASE: ICD-10-CM

## 2024-06-02 DIAGNOSIS — B20 HUMAN IMMUNODEFICIENCY VIRUS (HIV) DISEASE (HCC): ICD-10-CM

## 2024-06-02 DIAGNOSIS — E03.9 HYPOTHYROIDISM, UNSPECIFIED TYPE: ICD-10-CM

## 2024-06-02 DIAGNOSIS — R63.4 UNINTENTIONAL WEIGHT LOSS: Primary | ICD-10-CM

## 2024-06-02 LAB
AMYLASE SERPL-CCNC: 133 U/L (ref 30–118)
LIPASE SERPL-CCNC: 55 U/L (ref 12–53)

## 2024-06-03 ENCOUNTER — PATIENT MESSAGE (OUTPATIENT)
Dept: INTERNAL MEDICINE CLINIC | Facility: CLINIC | Age: 74
End: 2024-06-03

## 2024-06-03 DIAGNOSIS — R79.89 LOW VITAMIN B12 LEVEL: Primary | ICD-10-CM

## 2024-06-03 RX ORDER — ROSUVASTATIN CALCIUM 10 MG/1
10 TABLET, COATED ORAL NIGHTLY
Qty: 90 TABLET | Refills: 1 | Status: SHIPPED | OUTPATIENT
Start: 2024-06-03 | End: 2024-09-01

## 2024-06-04 ENCOUNTER — TELEPHONE (OUTPATIENT)
Dept: INTERNAL MEDICINE CLINIC | Facility: CLINIC | Age: 74
End: 2024-06-04

## 2024-06-04 ENCOUNTER — TELEPHONE (OUTPATIENT)
Facility: CLINIC | Age: 74
End: 2024-06-04

## 2024-06-04 RX ORDER — CYANOCOBALAMIN 1000 UG/ML
1000 INJECTION, SOLUTION INTRAMUSCULAR; SUBCUTANEOUS
Status: SHIPPED | OUTPATIENT
Start: 2024-06-04 | End: 2024-09-02

## 2024-06-04 RX ORDER — OMEPRAZOLE 20 MG/1
20 CAPSULE, DELAYED RELEASE ORAL EVERY MORNING
Qty: 30 CAPSULE | Refills: 0 | Status: SHIPPED | OUTPATIENT
Start: 2024-06-04

## 2024-06-04 NOTE — TELEPHONE ENCOUNTER
Patient called to make an Nurse appt for B12.  The order is not in.   I will call patient back when it is in patient's chart.

## 2024-06-04 NOTE — TELEPHONE ENCOUNTER
Patient contacted and appointment made for 06/05/2024 at 2:20 pm with Dr. Espino.  Patient states she is not taking a PPI at this time.  Patient advised to arrive 15 minutes prior to appointment and address given.  Patient voiced understanding.    FYI sent to Dr. Espino.

## 2024-06-04 NOTE — TELEPHONE ENCOUNTER
----- Message from Silver Ugarte sent at 6/3/2024  4:54 PM CDT -----  Good afternoon Dr Espino,   I had a video visit with Yanna last week for unintentional weight loss and black stool   I appreciate if you can see her sooner than your first available.    Thank you,   Silver Ugarte

## 2024-06-04 NOTE — TELEPHONE ENCOUNTER
Awilda Panye, [facility] 6/3/2024 8:29 AM CDT      ----- Message -----  From: ANGELA Cha \"Yanna\"  Sent: 6/3/2024 8:26 AM CDT  To: Blade Abdi Clinical Staff  Subject: B12     Is there a way to get B12 injection instead of pills?

## 2024-06-04 NOTE — TELEPHONE ENCOUNTER
Please have her start on omeprazole 20mg daily - sent to pharmacy, we can see if this helps with symptoms

## 2024-06-04 NOTE — TELEPHONE ENCOUNTER
RN to see if we can get this patient asap with me or one of the APNs- abdominal pain and dark stool   - please also see if taking PPI and go on bland diet

## 2024-06-05 ENCOUNTER — NURSE ONLY (OUTPATIENT)
Dept: INTERNAL MEDICINE CLINIC | Facility: CLINIC | Age: 74
End: 2024-06-05
Payer: MEDICARE

## 2024-06-05 ENCOUNTER — OFFICE VISIT (OUTPATIENT)
Facility: CLINIC | Age: 74
End: 2024-06-05
Payer: MEDICARE

## 2024-06-05 ENCOUNTER — TELEPHONE (OUTPATIENT)
Facility: CLINIC | Age: 74
End: 2024-06-05

## 2024-06-05 VITALS
SYSTOLIC BLOOD PRESSURE: 152 MMHG | HEIGHT: 58 IN | WEIGHT: 130.31 LBS | DIASTOLIC BLOOD PRESSURE: 84 MMHG | HEART RATE: 72 BPM | BODY MASS INDEX: 27.35 KG/M2

## 2024-06-05 DIAGNOSIS — R63.4 WEIGHT LOSS: Primary | ICD-10-CM

## 2024-06-05 PROCEDURE — 1159F MED LIST DOCD IN RCRD: CPT | Performed by: INTERNAL MEDICINE

## 2024-06-05 PROCEDURE — 99214 OFFICE O/P EST MOD 30 MIN: CPT | Performed by: INTERNAL MEDICINE

## 2024-06-05 PROCEDURE — 3008F BODY MASS INDEX DOCD: CPT | Performed by: INTERNAL MEDICINE

## 2024-06-05 PROCEDURE — 3079F DIAST BP 80-89 MM HG: CPT | Performed by: INTERNAL MEDICINE

## 2024-06-05 PROCEDURE — 1160F RVW MEDS BY RX/DR IN RCRD: CPT | Performed by: INTERNAL MEDICINE

## 2024-06-05 PROCEDURE — 1125F AMNT PAIN NOTED PAIN PRSNT: CPT | Performed by: INTERNAL MEDICINE

## 2024-06-05 PROCEDURE — 3077F SYST BP >= 140 MM HG: CPT | Performed by: INTERNAL MEDICINE

## 2024-06-05 PROCEDURE — 96372 THER/PROPH/DIAG INJ SC/IM: CPT | Performed by: INTERNAL MEDICINE

## 2024-06-05 RX ORDER — EMTRICITABINE AND TENOFOVIR ALAFENAMIDE 200; 25 MG/1; MG/1
1 TABLET ORAL DAILY
COMMUNITY
Start: 2024-04-26

## 2024-06-05 RX ADMIN — CYANOCOBALAMIN 1000 MCG: 1000 INJECTION, SOLUTION INTRAMUSCULAR; SUBCUTANEOUS at 09:02:00

## 2024-06-05 NOTE — PATIENT INSTRUCTIONS
Weight loss  Change in bowel habits and stools (dark )  - Go ahead with the CT scan abdomen/pelvis  - EGD with MAC   - continue on omeprazole x 1 month  - stool test ( fecal elastase ) to check for absorption issues

## 2024-06-05 NOTE — PROGRESS NOTES
ANGELA Cha is a 74 year old female.    HPI:     Chief Complaint   Patient presents with    Abdominal Pain     Had dark stool in 24 only happens once , weight loss      The patient is a 74-year-old female with a history of anxiety, asthma, thyroid disorder, HIV, internal hemorrhoids,  who presents with change in bowel habits/dark stool as well as weight loss.    She describes about a 9 pound weight loss.  She reports that she is eating and denies any abdominal pain.  She did have an episode of eating some pizza and salad and developed some anorexia and noted dark stools for about 3 days.  This was about 2 to 3 weeks ago.  This is since resolved.  She is feeling somewhat better.  She has an appetite now typically has a bowel movement after every meal which is somewhat different for her as far as her bowel habits go.    The patient prior upper endoscopy was about 2 years ago, she has a hiatal hernia, Schatzki's ring and gastritis noted.  A colonoscopy was performed in  by Dr. Saunders, normal except for small internal hemorrhoids.      HISTORY:  Past Medical History:    Allergic rhinitis    Anxiety state    occasional, no medication    Asthma (HCC)    Disorder of thyroid    hypothyroidism    History of blood transfusion    [facility]    HIV (human immunodeficiency virus infection) (HCC)    HIV (human immunodeficiency virus infection) (HCC)    Hyperlipidemia    Hypothyroidism    Internal hemorrhoids    Tinnitus    Visual impairment    right eye, glasses      Past Surgical History:   Procedure Laterality Date          Cataract      surgery     Colonoscopy N/A 10/20/2017    Procedure: COLONOSCOPY;  Surgeon: Rosalinda Victor MD;  Location: [facility] ENDOSCOPY    Egd  2018        Eye surgery Right       Family History   Problem Relation Age of Onset    Hypertension Father     Diabetes Sister     Other (cervical cancer) Maternal Cousin Male         pre josefa       Social History:   Social History     Socioeconomic History    Marital status: Single   Tobacco Use    Smoking status: Never    Smokeless tobacco: Never    Tobacco comments:     Pt denies passive smoke exposure in her home.    Vaping Use    Vaping status: Never Used   Substance and Sexual Activity    Alcohol use: Not Currently     Comment: On occasion    Drug use: Never        Medications (Active prior to today's visit):  Current Outpatient Medications   Medication Sig Dispense Refill    DESCOVY 200-25 MG Oral Tab Take 1 tablet by mouth daily.      omeprazole 20 MG Oral Capsule Delayed Release Take 1 capsule (20 mg total) by mouth every morning. 30 capsule 0    rosuvastatin 10 MG Oral Tab Take 1 tablet (10 mg total) by mouth nightly. 90 tablet 1    ergocalciferol 1.25 MG (48009 UT) Oral Cap TAKE 1 CAPSULE (50,000 UNITS TOTAL) BY MOUTH ONCE A WEEK FOR 12 DOSES 12 capsule 1    Budesonide-Formoterol Fumarate (SYMBICORT) 80-4.5 MCG/ACT Inhalation Aerosol Inhale 2 puffs into the lungs 2 (two) times daily. 3 each 1    albuterol (PROAIR HFA) 108 (90 Base) MCG/ACT Inhalation Aero Soln Inhale 2 puffs into the lungs every 6 (six) hours as needed for Wheezing. 1 each 1    levocetirizine 5 MG Oral Tab Take 1 tablet (5 mg total) by mouth every evening. 90 tablet 1    albuterol (VENTOLIN HFA) 108 (90 Base) MCG/ACT Inhalation Aero Soln INHALE 2 PUFFS INTO THE LUNGS EVERY 4 TO 6 HOURS AS NEEDED FOR WHEEZING 18 g 0    Azelastine HCl 137 MCG/SPRAY Nasal Solution SPRAY 2 SPRAYS BY NASAL ROUTE DAILY 1 each 2    levothyroxine 75 MCG Oral Tab Take 1 tablet (75 mcg total) by mouth before breakfast. 90 tablet 3    clotrimazole 1 % External Cream APPLY TO RASH ON THE LOWER CHEST AND LOWER ABDOMEN ONCE DAILY INDEFINITELY GENTLY SMALL AMOUNTS      Mometasone Furoate 0.1 % External Cream 1 ring. every 28 days. FOR 21 DAYS IN, THEN REMOVE FOR 7 DAYS      montelukast (SINGULAIR) 10 MG Oral Tab Take 1 tablet (10 mg total) by mouth nightly. 90  tablet 1    traMADol 50 MG Oral Tab Take 1 tablet (50 mg total) by mouth every evening. 2 tablet 0    cyclobenzaprine 10 MG Oral Tab Take 1 tablet (10 mg total) by mouth 2 (two) times daily as needed for Muscle spasms. 30 tablet 1    Etravirine (INTELENCE) 200 MG Oral Tab Take 1 tablet by mouth 2 (two) times a day. 180 tablet 0       Allergies:  Allergies   Allergen Reactions    Dust Mite Extract ITCHING         ROS:   The patient denies any chest pain or shortness of breath,  No neurologic or dermatologic symptoms.     PHYSICAL EXAM:   Blood pressure (!) 154/92, pulse 72, height 4' 10\" (1.473 m), weight 130 lb 4.8 oz (59.1 kg), not currently breastfeeding.    The patient appears their stated age and is in no acute distress  HEENT- anicteric sclera, neck no lymphadnopathy, OP- clear with no masses or lesions  Chest- Clear bilaterally, no wheezing,  Heart- regular rate, no murmur or gallop  Abdomen- Soft and nontender, nondistended  Ext- no clubbing or cyanosis  Skin- no rashes or lesions  Neuro- appropriate response, alert, no confusion  .  ASSESSMENT/PLAN:   Assessment     The patient has noted a weight loss, change in bowel habits with dark stools for several days about 2 weeks ago.  Discussed possible gastritis, ulcer or other process.  Discussed proceeding with a CT scan to exclude extraluminal causes such as pancreas or liver issue and upper endoscopy was reviewed as well.  Risks and benefits discussed and she agrees to proceed.  Omeprazole continue on x 1 month,    Plan  Weight loss  Change in bowel habits and stools (dark )  - Go ahead with the CT scan abdomen/pelvis  - EGD with MAC   - continue on omeprazole x 1 month  - stool test ( fecal elastase ) to check for absorption issues        Orders This Visit:  No orders of the defined types were placed in this encounter.      Meds This Visit:  Requested Prescriptions      No prescriptions requested or ordered in this encounter       Imaging & Referrals:  None        Antonio Espino MD  [facility] Gastroenterology

## 2024-06-05 NOTE — TELEPHONE ENCOUNTER
Scheduled for:  Egd 24144  Provider Name:  Dr. Espino  Date:  8/12/24  Location:[facility]  Sedation:  MAC  Time: 0945 Am  (Patient is aware arrival time is at 08:45 Am )  Prep:  Egd - Npo 3 hours before prior to procedure   Meds/Allergies Reconciled?:  Physician Reviewed   Diagnosis with codes:    Was patient informed to call insurance with codes (Y/N):  Yes  Referral sent?:  Referral was sent at the time of electronic surgical scheduling.  EM or [facility] notified?:  I sent an electronic request to Endo Scheduling and received a confirmation today.  Medication Orders:  Pt is aware to NOT take iron pills, herbal meds and diet supplements for 7 days before exam. Also to NOT take any form of alcohol, recreational drugs and any forms of ED meds 24 hours before exam.   Misc Orders: Patient was informed about the new cancellation policy for his/her procedure. Patient was also given a copy of the cancellation policy at the time of the appointment and verbalized understanding.    Further instructions given by staff:  I provide prep instructions to patient at the time of the appointment and reviewed date, time and location, patient verbalized that she understood and is aware to call if she has any questions.

## 2024-06-07 ENCOUNTER — PATIENT MESSAGE (OUTPATIENT)
Dept: INTERNAL MEDICINE CLINIC | Facility: CLINIC | Age: 74
End: 2024-06-07

## 2024-06-07 NOTE — TELEPHONE ENCOUNTER
From: ANGELA Cha  To: Silver Ugarte  Sent: 6/7/2024 2:04 PM CDT  Subject: Referral for [employer]    Alma to bother you but [employer] needs you to send over a referral for the CT Abdomen procedure.Thank you

## 2024-06-07 NOTE — TELEPHONE ENCOUNTER
Message sent to [facility] team to send clinical information to [facility].  Bionym message sent to the patient.

## 2024-06-21 ENCOUNTER — HOSPITAL ENCOUNTER (OUTPATIENT)
Dept: CT IMAGING | Facility: HOSPITAL | Age: 74
Discharge: HOME | End: 2024-06-21
Attending: INTERNAL MEDICINE

## 2024-06-21 DIAGNOSIS — R63.4 UNINTENTIONAL WEIGHT LOSS: ICD-10-CM

## 2024-06-21 DIAGNOSIS — B20 HUMAN IMMUNODEFICIENCY VIRUS (HIV) DISEASE (HCC): ICD-10-CM

## 2024-06-21 DIAGNOSIS — E03.9 HYPOTHYROIDISM, UNSPECIFIED TYPE: ICD-10-CM

## 2024-06-21 DIAGNOSIS — R74.8 ELEVATED LIPASE: ICD-10-CM

## 2024-06-21 PROCEDURE — 74177 CT ABD & PELVIS W/CONTRAST: CPT | Performed by: INTERNAL MEDICINE

## 2024-06-26 ENCOUNTER — OFFICE VISIT (OUTPATIENT)
Dept: INTERNAL MEDICINE CLINIC | Facility: CLINIC | Age: 74
End: 2024-06-26

## 2024-06-26 VITALS
WEIGHT: 131.38 LBS | HEART RATE: 74 BPM | DIASTOLIC BLOOD PRESSURE: 72 MMHG | HEIGHT: 58 IN | OXYGEN SATURATION: 98 % | BODY MASS INDEX: 27.58 KG/M2 | SYSTOLIC BLOOD PRESSURE: 118 MMHG

## 2024-06-26 DIAGNOSIS — B37.0 ORAL THRUSH: ICD-10-CM

## 2024-06-26 DIAGNOSIS — R79.89 LOW VITAMIN B12 LEVEL: ICD-10-CM

## 2024-06-26 DIAGNOSIS — R63.4 UNINTENTIONAL WEIGHT LOSS: ICD-10-CM

## 2024-06-26 DIAGNOSIS — R06.83 SNORING: ICD-10-CM

## 2024-06-26 DIAGNOSIS — E78.5 HYPERLIPIDEMIA, UNSPECIFIED HYPERLIPIDEMIA TYPE: ICD-10-CM

## 2024-06-26 DIAGNOSIS — L65.9 HAIR LOSS: Primary | ICD-10-CM

## 2024-06-26 DIAGNOSIS — B20 HUMAN IMMUNODEFICIENCY VIRUS (HIV) DISEASE (HCC): ICD-10-CM

## 2024-06-26 DIAGNOSIS — R53.83 OTHER FATIGUE: ICD-10-CM

## 2024-06-26 DIAGNOSIS — E03.9 HYPOTHYROIDISM, UNSPECIFIED TYPE: ICD-10-CM

## 2024-06-26 PROCEDURE — 1159F MED LIST DOCD IN RCRD: CPT | Performed by: INTERNAL MEDICINE

## 2024-06-26 PROCEDURE — 3074F SYST BP LT 130 MM HG: CPT | Performed by: INTERNAL MEDICINE

## 2024-06-26 PROCEDURE — 3008F BODY MASS INDEX DOCD: CPT | Performed by: INTERNAL MEDICINE

## 2024-06-26 PROCEDURE — 99214 OFFICE O/P EST MOD 30 MIN: CPT | Performed by: INTERNAL MEDICINE

## 2024-06-26 PROCEDURE — 3078F DIAST BP <80 MM HG: CPT | Performed by: INTERNAL MEDICINE

## 2024-06-26 PROCEDURE — 1160F RVW MEDS BY RX/DR IN RCRD: CPT | Performed by: INTERNAL MEDICINE

## 2024-06-26 RX ORDER — DARUNAVIR ETHANOLATE AND COBICISTAT 800; 150 MG/1; MG/1
1 TABLET, FILM COATED ORAL DAILY
COMMUNITY
Start: 2024-06-13

## 2024-06-26 NOTE — PROGRESS NOTES
ANGELA Cha is a 74 year old female.    Chief complaint: follow up on chronic conditions       HPI:     ANGELA Cha is a 74 year old pleasant female who presents for a   Wt Readings from Last 6 Encounters:   06/26/24 131 lb 6.4 oz (59.6 kg)   06/05/24 130 lb 4.8 oz (59.1 kg)   12/29/23 133 lb (60.3 kg)   11/21/23 139 lb 6.4 oz (63.2 kg)   10/04/23 134 lb (60.8 kg)   08/10/23 135 lb (61.2 kg)      Weight is stable   No more weight loss   Appetite is good   No chest pain   No sob         Abdominal pain : no   No blood in the stool   No vomiting   Just a lot of nausea   She is nauseated when she is eating   She is taking omeprazole   +HH   No diarrhea   Constipation only after she took the contrast                 Oral thrush  Sometimes can be irritating when she eats or drinks   Did try mouth wash before couple of years ago       Hair loss   Thinning and loss of the hair   Not in patches           Current Outpatient Medications   Medication Sig Dispense Refill    PREZCOBIX 800-150 MG Oral Tab Take 1 tablet by mouth daily.      DESCOVY 200-25 MG Oral Tab Take 1 tablet by mouth daily.      omeprazole 20 MG Oral Capsule Delayed Release Take 1 capsule (20 mg total) by mouth every morning. 30 capsule 0    rosuvastatin 10 MG Oral Tab Take 1 tablet (10 mg total) by mouth nightly. 90 tablet 1    ergocalciferol 1.25 MG (89298 UT) Oral Cap TAKE 1 CAPSULE (50,000 UNITS TOTAL) BY MOUTH ONCE A WEEK FOR 12 DOSES 12 capsule 1    Budesonide-Formoterol Fumarate (SYMBICORT) 80-4.5 MCG/ACT Inhalation Aerosol Inhale 2 puffs into the lungs 2 (two) times daily. 3 each 1    albuterol (PROAIR HFA) 108 (90 Base) MCG/ACT Inhalation Aero Soln Inhale 2 puffs into the lungs every 6 (six) hours as needed for Wheezing. 1 each 1    levocetirizine 5 MG Oral Tab Take 1 tablet (5 mg total) by mouth every evening. 90 tablet 1    albuterol (VENTOLIN HFA) 108 (90 Base) MCG/ACT Inhalation Aero Soln INHALE 2 PUFFS INTO THE LUNGS EVERY 4 TO 6  HOURS AS NEEDED FOR WHEEZING 18 g 0    levothyroxine 75 MCG Oral Tab Take 1 tablet (75 mcg total) by mouth before breakfast. 90 tablet 3    Azelastine HCl 137 MCG/SPRAY Nasal Solution SPRAY 2 SPRAYS BY NASAL ROUTE DAILY 1 each 2    clotrimazole 1 % External Cream APPLY TO RASH ON THE LOWER CHEST AND LOWER ABDOMEN ONCE DAILY INDEFINITELY GENTLY SMALL AMOUNTS      Mometasone Furoate 0.1 % External Cream 1 ring. every 28 days. FOR 21 DAYS IN, THEN REMOVE FOR 7 DAYS      Etravirine (INTELENCE) 200 MG Oral Tab Take 1 tablet by mouth 2 (two) times a day. 180 tablet 0      Past Medical History:    Allergic rhinitis    Anxiety state    occasional, no medication    Asthma (HCC)    Disorder of thyroid    hypothyroidism    History of blood transfusion    [facility]    HIV (human immunodeficiency virus infection) ([facility])    HIV (human immunodeficiency virus infection) ([facility])    Hyperlipidemia    Hypothyroidism    Internal hemorrhoids    Tinnitus    Visual impairment    right eye, glasses     Past Surgical History:   Procedure Laterality Date          Cataract      surgery     Colonoscopy N/A 10/20/2017    Procedure: COLONOSCOPY;  Surgeon: Rosalinda Victor MD;  Location: [facility] ENDOSCOPY    Egd  2018        Eye surgery Right              Family History   Problem Relation Age of Onset    Hypertension Father     Diabetes Sister     Other (cervical cancer) Maternal Cousin Male         pre josefa     Patient Active Problem List   Diagnosis    Human immunodeficiency virus (HIV) disease (HCC)    Hypothyroidism, unspecified type       REVIEW OF SYSTEMS:   A comprehensive 10 point review of systems was completed.  Pertinent positives and negatives noted in the the HPI            EXAM:   /72   Pulse 74   Ht 4' 10\" (1.473 m)   Wt 131 lb 6.4 oz (59.6 kg)   LMP  (LMP Unknown)   SpO2 98%   BMI 27.46 kg/m²   GENERAL: well developed, well nourished,in no apparent distress  Some thinning of  the hair   LUNGS: clear to auscultation  CARDIO: RRR without murmur  GI: no masses, HSM or tenderness  EXTREMITIES: no cyanosis, clubbing or edema  NEURO: no gross deficits              Orders Placed This Encounter    PREZCOBIX 800-150 MG Oral Tab     Sig: Take 1 tablet by mouth daily.     CT ABDOMEN+PELVIS(CONTRAST ONLY)(CPT=74177)    Result Date: 6/21/2024  CONCLUSION:   Mild wall thickening of the distal esophagus at the gastroesophageal junction may be related to esophagitis. If clinically indicated, further evaluation with direct visualization could be performed.  Large amount of excessive stool seen throughout the colon suggestive of constipation. No obstruction.     Dictated by (CST): Fernando Tavares MD on 6/21/2024 at 11:38 AM     Finalized by (CST): Fernando Tavares MD on 6/21/2024 at 11:49 AM          XR KNEE COMPLETE BILAT EM(CPT=73564-50)    Result Date: 5/17/2024  CONCLUSION:  Minimal bilateral patellofemoral joint osteoarthritis, unchanged.    Dictated by (CST): Leanne Mosher MD on 5/17/2024 at 6:56 AM     Finalized by (CST): Leanne Mosher MD on 5/17/2024 at 6:57 AM                ASSESSMENT AND PLAN:     1. Hair loss  Advised to take vitamin b12 , iron   Can start viviscal for he hair   Referral to dermatology for further evaluation and treatment   - PREZCOBIX 800-150 MG Oral Tab; Take 1 tablet by mouth daily.  - Lipid Panel [E]; Future  - Iron And Tibc; Future  - Ferritin; Future  - Derm Referral - In Network  - Pulmonary Referral - In Network  - nystatin 849134 UNIT/ML Mouth/Throat Suspension; Take 1 mL (100,000 Units total) by mouth 4 (four) times daily.  Dispense: 60 mL; Refill: 0    2. Snoring  Referral to sleep specialist     - PREZCOBIX 800-150 MG Oral Tab; Take 1 tablet by mouth daily.  - Lipid Panel [E]; Future  - Iron And Tibc; Future  - Ferritin; Future  - Derm Referral - In Network  - Pulmonary Referral - In Network  - nystatin 479639 UNIT/ML Mouth/Throat Suspension; Take 1 mL (100,000  Units total) by mouth 4 (four) times daily.  Dispense: 60 mL; Refill: 0    3. Hypothyroidism, unspecified type  Stable   - PREZCOBIX 800-150 MG Oral Tab; Take 1 tablet by mouth daily.  - Lipid Panel [E]; Future  - Iron And Tibc; Future  - Ferritin; Future  - Derm Referral - In Network  - Pulmonary Referral - In [facility]  - nystatin 557194 UNIT/ML Mouth/Throat Suspension; Take 1 mL (100,000 Units total) by mouth 4 (four) times daily.  Dispense: 60 mL; Refill: 0    4. Unintentional weight loss  Stable   Had initial work up and CT abdomen and pelvis   Will continue to momitor   - PREZCOBIX 800-150 MG Oral Tab; Take 1 tablet by mouth daily.  - Lipid Panel [E]; Future  - Iron And Tibc; Future  - Ferritin; Future  - Derm Referral - In Network  - Pulmonary Referral - In [facility]  - nystatin 962437 UNIT/ML Mouth/Throat Suspension; Take 1 mL (100,000 Units total) by mouth 4 (four) times daily.  Dispense: 60 mL; Refill: 0    5. Oral thrush  Nystatin mouth wash   If she continues to have it after completing treatment to start oral fluconazole   - PREZCOBIX 800-150 MG Oral Tab; Take 1 tablet by mouth daily.  - Lipid Panel [E]; Future  - Iron And Tibc; Future  - Ferritin; Future  - Derm Referral - In Network  - Pulmonary Referral - In [facility]  - nystatin 393316 UNIT/ML Mouth/Throat Suspension; Take 1 mL (100,000 Units total) by mouth 4 (four) times daily.  Dispense: 60 mL; Refill: 0    6. Human immunodeficiency virus (HIV) disease (HCC)  Follow up with ID  - PREZCOBIX 800-150 MG Oral Tab; Take 1 tablet by mouth daily.  - Lipid Panel [E]; Future  - Iron And Tibc; Future  - Ferritin; Future  - Derm Referral - In Network  - Pulmonary Referral - In [facility]  - nystatin 522047 UNIT/ML Mouth/Throat Suspension; Take 1 mL (100,000 Units total) by mouth 4 (four) times daily.  Dispense: 60 mL; Refill: 0    7. Low vitamin B12 level  IM vitamin b12   - PREZCOBIX 800-150 MG Oral Tab; Take 1 tablet by mouth daily.  - Lipid Panel [E]; Future  -  Iron And Tibc; Future  - Ferritin; Future  - Derm Referral - In Network  - Pulmonary Referral - In Network  - nystatin 301053 UNIT/ML Mouth/Throat Suspension; Take 1 mL (100,000 Units total) by mouth 4 (four) times daily.  Dispense: 60 mL; Refill: 0    8. Hyperlipidemia, unspecified hyperlipidemia type  Lipid panel in  3months   Continue statin    9-fatigue : can be secondary ti low vitamin b12 ,and iron   Advised to take b12 and iron   MV   Referral to sleep specialist to rule out DEMETRIUS      Please return to the clinic if you are having persistent symptoms. If worsening symptoms should go to the ER    Silver Ugarte MD,   Diplomate of the American Board of Internal Medicine  Diplomate of the American Board of Obesity Medicine

## 2024-06-27 PROBLEM — L65.9 HAIR LOSS: Status: ACTIVE | Noted: 2024-06-27

## 2024-06-27 PROBLEM — B37.0 ORAL THRUSH: Status: ACTIVE | Noted: 2024-06-27

## 2024-06-27 PROBLEM — R06.83 SNORING: Status: ACTIVE | Noted: 2024-06-27

## 2024-06-27 PROBLEM — R63.4 UNINTENTIONAL WEIGHT LOSS: Status: ACTIVE | Noted: 2024-06-27

## 2024-07-01 NOTE — TELEPHONE ENCOUNTER
Current Outpatient Medications   Medication Sig Dispense Refill    omeprazole 20 MG Oral Capsule Delayed Release Take 1 capsule (20 mg total) by mouth every morning. 30 capsule 0

## 2024-07-01 NOTE — TELEPHONE ENCOUNTER
Dr. Irene Espino recommended patient continue on omeprazole x1 month, but patient requesting refill.  She has EGD scheduled 8/12/24-should she continue it until EGD?    LOV 6/5/2024

## 2024-07-05 ENCOUNTER — TELEPHONE (OUTPATIENT)
Facility: CLINIC | Age: 74
End: 2024-07-05

## 2024-07-05 ENCOUNTER — NURSE ONLY (OUTPATIENT)
Dept: INTERNAL MEDICINE CLINIC | Facility: CLINIC | Age: 74
End: 2024-07-05
Payer: MEDICARE

## 2024-07-05 PROCEDURE — 96372 THER/PROPH/DIAG INJ SC/IM: CPT | Performed by: INTERNAL MEDICINE

## 2024-07-05 RX ADMIN — CYANOCOBALAMIN 1000 MCG: 1000 INJECTION, SOLUTION INTRAMUSCULAR; SUBCUTANEOUS at 10:10:00

## 2024-07-05 NOTE — TELEPHONE ENCOUNTER
1st,overdue reminder letter mailed out to patient   Labs order  Pancreatic Elastase, Fecal (Order #[id number]) on 6/5/24

## 2024-07-08 RX ORDER — OMEPRAZOLE 20 MG/1
20 CAPSULE, DELAYED RELEASE ORAL EVERY MORNING
Qty: 30 CAPSULE | Refills: 1 | Status: SHIPPED | OUTPATIENT
Start: 2024-07-08

## 2024-07-08 NOTE — TELEPHONE ENCOUNTER
----- Message from April Miller sent at 1/5/2021  7:47 AM CST -----  Regarding: Other  Contact: [phone]  I have a recurring rash different part of my body.  Can you recommend a dermatologist? Assessment & Plan:    Controlled type 2 diabetes mellitus with microalbuminuria, without long-term current use of insulin  -     Diabetic Eye Screening Photo; Future    CKD stage 3 due to type 2 diabetes mellitus    Dyslipidemia associated with type 2 diabetes mellitus    Aortic atherosclerosis    Coronary artery disease involving coronary bypass graft of native heart without angina pectoris    Essential hypertension    Gout, unspecified cause, unspecified chronicity, unspecified site      I am uncertain which medications he is taking at home. He states that his granddaughter manages his medications so he was asked to have her call the office with the names of the medications he is taking.   We will hold off on doing any labs for now.   Due for diabetic eye photo.    Encouraged RSV and shingles vaccines.    Follow-up: Follow up in about 3 months (around 10/8/2024).  ______________________________________________________________________    Chief Complaint  Chief Complaint   Patient presents with    Health Maintenance       HPI  Destin Barber is a 89 y.o. male with medical diagnoses as listed in the medical history and problem list that presents to the office to follow up on his chronic conditions, for which he was last seen on 4/5/2024. Patient states that he stopped taking the new medication that was prescribed for him because it made him feel sick. He is unable to say which medication this was. He states that he is only taking his blood pressure medication but he has 4 medications listed in his med list.     Health Maintenance         Date Due Completion Date    Aspirin/Antiplatelet Therapy Never done ---    Shingles Vaccine (1 of 2) Never done ---    RSV Vaccine (Age 60+ and Pregnant patients) (1 - 1-dose 60+ series) Never done ---    Eye Exam 02/05/2019 2/5/2018    COVID-19 Vaccine (7 - 2023-24 season) 09/01/2023 12/4/2021    Influenza Vaccine (1) 09/01/2024 12/2/2023    Hemoglobin A1c 09/26/2024 3/26/2024     Lipid Panel 2025 3/26/2024    Diabetes Urine Screening 2025    TETANUS VACCINE 2028              PAST MEDICAL HISTORY:  Past Medical History:   Diagnosis Date    Acute coronary syndrome 2016    s/p 3V CABG 2016    Anemia     Coronary artery disease     Diastolic dysfunction     Gout, chronic     Heart failure     HTN (hypertension)     Hyperlipidemia     Myocardial infarction     Pneumonia due to other staphylococcus     Pressure ulcer     Renal manifestation of secondary diabetes mellitus     Type 2 diabetes mellitus     diet controlled    Urge incontinence of urine 2023       PAST SURGICAL HISTORY:  Past Surgical History:   Procedure Laterality Date    CATARACT EXTRACTION Bilateral     CATARACT EXTRACTION W/ ANTERIOR VITRECTOMY      CORONARY ARTERY BYPASS GRAFT  2016    PAIZ-LAD, SVG-Ramus, SVG-PDA    FLUOROSCOPIC URODYNAMIC STUDY N/A 2023    Procedure: URODYNAMIC STUDY, FLUOROSCOPIC;  Surgeon: Raji Mcmullen MD;  Location: [facility] OR;  Service: Urology;  Laterality: N/A;  RN PHONE PREOP WITH GRANDDAUGHTER ROGER 23    HEMORRHOID SURGERY      LASER ENUCLEATION OF PROSTATE N/A 2023    Procedure: ENUCLEATION, PROSTATE, USING LASER; cystolithalopaxy;  Surgeon: Raji Mcmullen MD;  Location: [facility] OR;  Service: Urology;  Laterality: N/A;  [facility] [phone] SPOKE TO NICK ON 2023 @ 10:37AM. CONFIRMATION NUMBER [id number] POLLACK [phone] EMAILED [place]ANNING ON 6/15/2023@ 3:07PM-KEYON  RN PREOP 2023   T/S ON 2023  RN PREOP 2023 --CARLOS       SOCIAL HISTORY:  Social History     Socioeconomic History    Marital status:     Number of children: 3    Highest education level: 6th grade   Tobacco Use    Smoking status: Former     Types: Cigars     Start date:      Quit date:      Years since quittin.5    Smokeless tobacco: Never    Tobacco comments:     Former smoker. Pt. Smoked 2 cigars daily.    Substance and Sexual Activity    Alcohol use: No    Drug use: No    Sexual activity: Not Currently     Partners: Female     Social Determinants of Health     Financial Resource Strain: Low Risk  (3/22/2023)    Overall Financial Resource Strain (CARDIA)     Difficulty of Paying Living Expenses: Not hard at all   Food Insecurity: No Food Insecurity (3/22/2023)    Hunger Vital Sign     Worried About Running Out of Food in the Last Year: Never true     Ran Out of Food in the Last Year: Never true   Transportation Needs: No Transportation Needs (3/22/2023)    PRAPARE - Transportation     Lack of Transportation (Medical): No     Lack of Transportation (Non-Medical): No   Physical Activity: Sufficiently Active (3/22/2023)    Exercise Vital Sign     Days of Exercise per Week: 5 days     Minutes of Exercise per Session: 30 min   Stress: No Stress Concern Present (3/22/2023)    [employer] [place] [employer] - Occupational Stress Questionnaire     Feeling of Stress : Not at all   Housing Stability: Unknown (3/22/2023)    Housing Stability Vital Sign     Unable to Pay for Housing in the Last Year: No     Unstable Housing in the Last Year: No       FAMILY HISTORY:  Family History   Problem Relation Name Age of Onset    Hypertension Mother      Heart disease Mother      Cancer Father          colon    Heart disease Sister      No Known Problems Sister      No Known Problems Sister      No Known Problems Sister      No Known Problems Sister      Heart disease Brother      No Known Problems Brother      No Known Problems Brother      No Known Problems Brother      No Known Problems Brother      No Known Problems Brother      No Known Problems Daughter      No Known Problems Daughter      No Known Problems Son      Amblyopia Neg Hx      Blindness Neg Hx      Cataracts Neg Hx      Diabetes Neg Hx      Glaucoma Neg Hx      Macular degeneration Neg Hx      Retinal detachment Neg Hx      Strabismus Neg Hx      Stroke Neg Hx       Thyroid disease Neg Hx         ALLERGIES AND MEDICATIONS: updated and reviewed.  Review of patient's allergies indicates:  No Known Allergies  Current Outpatient Medications   Medication Sig Dispense Refill    amLODIPine (NORVASC) 10 MG tablet Take 1 tablet (10 mg total) by mouth once daily. 90 tablet 3    benzonatate (TESSALON) 200 MG capsule Take 1 capsule (200 mg total) by mouth 3 (three) times daily as needed for Cough. 30 capsule 1    blood sugar diagnostic Strp To check BG daily, to use with insurance preferred meter 200 each 11    blood-glucose meter kit To check BG daily, to use with insurance preferred meter 1 each 0    isosorbide mononitrate (IMDUR) 60 MG 24 hr tablet Take 1 tablet (60 mg total) by mouth once daily. 90 tablet 3    lancets Misc To check BG daily, to use with insurance preferred meter 200 each 11    lisinopriL (PRINIVIL,ZESTRIL) 40 MG tablet Take 1 tablet (40 mg total) by mouth once daily. 90 tablet 3    metFORMIN (GLUCOPHAGE-XR) 500 MG ER 24hr tablet TAKE 1 TABLET BY MOUTH DAILY WITH BREAKFAST 90 tablet 3    metoprolol tartrate (LOPRESSOR) 25 MG tablet Take 1 tablet by mouth 2 (two) times daily.      pravastatin (PRAVACHOL) 40 MG tablet Take 1 tablet (40 mg total) by mouth once daily. 90 tablet 3    somatropin (NORDITROPIN FLEXPRO) 10 mg/1.5 mL (6.7 mg/mL) PnIj Inject into the skin.      traZODone (DESYREL) 150 MG tablet TAKE 1 TABLET BY MOUTH EVERY NIGHT AS NEEDED FOR INSOMNIA. 90 tablet 3    valsartan (DIOVAN) 320 MG tablet Take 320 mg by mouth.      albuterol (PROVENTIL) 2.5 mg /3 mL (0.083 %) nebulizer solution Inhale 2.5 mg into the lungs. (Patient not taking: Reported on 7/8/2024)      allopurinoL (ZYLOPRIM) 100 MG tablet Take 1 tablet by mouth once daily. (Patient not taking: Reported on 7/8/2024)       No current facility-administered medications for this visit.     Facility-Administered Medications Ordered in Other Visits   Medication Dose Route Frequency Provider Last Rate  Last Admin    lactated ringers infusion   Intravenous Continuous Julia Fong MD 10 mL/hr at 07/06/23 1230 New Bag at 07/06/23 1230    LIDOcaine (PF) 10 mg/ml (1%) injection 10 mg  1 mL Intradermal Once Julia Fong MD             ROS  Review of Systems   Constitutional:  Negative for activity change.           Physical Exam  Vitals:    07/08/24 1402   BP: 130/78   Pulse: 110   Temp: 97.8 °F (36.6 °C)   TempSrc: Oral   SpO2: 95%   Weight: 63 kg (138 lb 14.2 oz)    Body mass index is 23.84 kg/m².  Weight: 63 kg (138 lb 14.2 oz)       Physical Exam  Constitutional:       General: He is not in acute distress.  HENT:      Head: Normocephalic and atraumatic.   Neck:      Vascular: No carotid bruit.   Cardiovascular:      Rate and Rhythm: Regular rhythm. Tachycardia present.   Pulmonary:      Effort: Pulmonary effort is normal. No respiratory distress.      Breath sounds: Normal breath sounds.   Musculoskeletal:      Cervical back: Neck supple.      Right lower leg: No edema.      Left lower leg: No edema.   Lymphadenopathy:      Cervical: No cervical adenopathy.   Skin:     General: Skin is warm and dry.   Neurological:      Mental Status: He is alert. Mental status is at baseline.   Psychiatric:         Mood and Affect: Mood normal.         Behavior: Behavior normal.

## 2024-07-09 DIAGNOSIS — E03.9 HYPOTHYROIDISM, UNSPECIFIED TYPE: ICD-10-CM

## 2024-07-11 ENCOUNTER — PATIENT MESSAGE (OUTPATIENT)
Dept: INTERNAL MEDICINE CLINIC | Facility: CLINIC | Age: 74
End: 2024-07-11

## 2024-07-11 DIAGNOSIS — E03.9 HYPOTHYROIDISM, UNSPECIFIED TYPE: ICD-10-CM

## 2024-07-11 RX ORDER — LEVOTHYROXINE SODIUM 0.07 MG/1
75 TABLET ORAL
Qty: 90 TABLET | Refills: 3 | OUTPATIENT
Start: 2024-07-11

## 2024-07-12 RX ORDER — LEVOTHYROXINE SODIUM 0.07 MG/1
75 TABLET ORAL
Qty: 90 TABLET | Refills: 3 | Status: SHIPPED | OUTPATIENT
Start: 2024-07-12

## 2024-07-12 NOTE — TELEPHONE ENCOUNTER
From: ANGELA Cha  To: Silver Ugarte  Sent: 7/11/2024 4:34 PM CDT  Subject: Refill     Need refill for levothyroxine.

## 2024-08-08 RX ORDER — OMEPRAZOLE 20 MG/1
20 CAPSULE, DELAYED RELEASE ORAL EVERY MORNING
Qty: 30 CAPSULE | Refills: 1 | Status: SHIPPED | OUTPATIENT
Start: 2024-08-08

## 2024-08-08 NOTE — TELEPHONE ENCOUNTER
Current Outpatient Medications   Medication Sig Dispense Refill    omeprazole 20 MG Oral Capsule Delayed Release Take 1 capsule (20 mg total) by mouth every morning. 30 capsule 1

## 2024-08-08 NOTE — TELEPHONE ENCOUNTER
Dr. Espino    Patient requesting refill for omeprazole.  Should patient stay on it?  She has an EGD with you next week.    Thank you

## 2024-08-09 ENCOUNTER — PATIENT MESSAGE (OUTPATIENT)
Facility: CLINIC | Age: 74
End: 2024-08-09

## 2024-08-09 NOTE — TELEPHONE ENCOUNTER
From: ANGELA Cha  To: Antonio Espino  Sent: 8/9/2024 12:07 PM CDT  Subject: Endoscopy    Has a referral been sent to [facility] for this procedure. Not sure if required.

## 2024-08-11 DIAGNOSIS — E61.1 IRON DEFICIENCY: Primary | ICD-10-CM

## 2024-08-11 RX ORDER — METHYLDOPA 500 MG
160 TABLET ORAL DAILY
Qty: 90 TABLET | Refills: 1 | Status: SHIPPED | OUTPATIENT
Start: 2024-08-11 | End: 2024-11-09

## 2024-08-12 ENCOUNTER — ANESTHESIA EVENT (OUTPATIENT)
Dept: ENDOSCOPY | Age: 74
End: 2024-08-12
Payer: MEDICARE

## 2024-08-12 ENCOUNTER — ANESTHESIA (OUTPATIENT)
Dept: ENDOSCOPY | Age: 74
End: 2024-08-12
Payer: MEDICARE

## 2024-08-12 ENCOUNTER — HOSPITAL ENCOUNTER (OUTPATIENT)
Age: 74
Setting detail: HOSPITAL OUTPATIENT SURGERY
Discharge: HOME | End: 2024-08-12
Attending: INTERNAL MEDICINE | Admitting: INTERNAL MEDICINE
Payer: MEDICARE

## 2024-08-12 DIAGNOSIS — R63.4 WEIGHT LOSS: ICD-10-CM

## 2024-08-12 PROCEDURE — 88312 SPECIAL STAINS GROUP 1: CPT | Performed by: INTERNAL MEDICINE

## 2024-08-12 PROCEDURE — 88305 TISSUE EXAM BY PATHOLOGIST: CPT | Performed by: INTERNAL MEDICINE

## 2024-08-12 RX ORDER — NALOXONE HYDROCHLORIDE 0.4 MG/ML
0.08 INJECTION, SOLUTION INTRAMUSCULAR; INTRAVENOUS; SUBCUTANEOUS ONCE AS NEEDED
OUTPATIENT
Start: 2024-08-12 | End: 2024-08-12

## 2024-08-12 RX ORDER — LIDOCAINE HYDROCHLORIDE 10 MG/ML
INJECTION, SOLUTION EPIDURAL; INFILTRATION; INTRACAUDAL; PERINEURAL AS NEEDED
Status: DISCONTINUED | OUTPATIENT
Start: 2024-08-12 | End: 2024-08-12 | Stop reason: SURG

## 2024-08-12 RX ORDER — SODIUM CHLORIDE, SODIUM LACTATE, POTASSIUM CHLORIDE, CALCIUM CHLORIDE 600; 310; 30; 20 MG/100ML; MG/100ML; MG/100ML; MG/100ML
INJECTION, SOLUTION INTRAVENOUS CONTINUOUS
Status: DISCONTINUED | OUTPATIENT
Start: 2024-08-12 | End: 2024-08-12

## 2024-08-12 RX ORDER — SODIUM CHLORIDE, SODIUM LACTATE, POTASSIUM CHLORIDE, CALCIUM CHLORIDE 600; 310; 30; 20 MG/100ML; MG/100ML; MG/100ML; MG/100ML
INJECTION, SOLUTION INTRAVENOUS CONTINUOUS
OUTPATIENT
Start: 2024-08-12

## 2024-08-12 RX ADMIN — LIDOCAINE HYDROCHLORIDE 50 MG: 10 INJECTION, SOLUTION EPIDURAL; INFILTRATION; INTRACAUDAL; PERINEURAL at 09:35:00

## 2024-08-12 RX ADMIN — SODIUM CHLORIDE, SODIUM LACTATE, POTASSIUM CHLORIDE, CALCIUM CHLORIDE: 600; 310; 30; 20 INJECTION, SOLUTION INTRAVENOUS at 09:34:00

## 2024-08-12 NOTE — ANESTHESIA PREPROCEDURE EVALUATION
Anesthesia PreOp Note    HPI:     ANGELA Cha is a 74 year old female who presents for preoperative consultation requested by: Antonio Espino MD    Date of Surgery: 2024    Procedure(s):  ESOPHAGOGASTRODUODENOSCOPY  Indication: Weight loss    Relevant Problems   No relevant active problems       NPO:  Last Liquid Consumption Date: 24  Last Liquid Consumption Time: 0100  Last Solid Consumption Date: 24  Last Solid Consumption Time: 2200  Last Liquid Consumption Date: 24          History Review:  Patient Active Problem List    Diagnosis Date Noted    Hair loss 2024    Snoring 2024    Unintentional weight loss 2024    Oral thrush 2024    Human immunodeficiency virus (HIV) disease (HCC) 10/06/2016    Hypothyroidism, unspecified type 10/06/2016       Past Medical History:    Allergic rhinitis    Anxiety state    occasional, no medication    Asthma (HCC)    Disorder of thyroid    hypothyroidism    History of blood transfusion    [facility]    HIV (human immunodeficiency virus infection) (HCC)    HIV (human immunodeficiency virus infection) ([facility])    Hyperlipidemia    Hypothyroidism    Internal hemorrhoids    Tinnitus    Visual impairment    right eye, glasses       Past Surgical History:   Procedure Laterality Date          Cataract      surgery     Colonoscopy N/A 10/20/2017    Procedure: COLONOSCOPY;  Surgeon: Rosalinda Victor MD;  Location: [facility] ENDOSCOPY    Egd  2018        Egd N/A 2024    ;    Eye surgery Right     Tonsillectomy         Facility-Administered Medications Prior to Admission   Medication Dose Route Frequency Provider Last Rate Last Admin    cyanocobalamin (Vitamin B12) 1000 MCG/ML injection 1,000 mcg  1,000 mcg Intramuscular Q30 Days    1,000 mcg at 24 1010    [COMPLETED] triamcinolone acetonide (Kenalog-40) 40 MG/ML injection 40 mg  40 mg Intra-articular BID Andrei Guthrie MD    40 mg at 24 1708     Medications Prior to Admission   Medication Sig Dispense Refill Last Dose    Ferrous Sulfate Dried ER (SLOW RELEASE IRON) 160 (50 Fe) MG Oral Tab CR Take 160 mg by mouth daily. 90 tablet 1 2024    omeprazole 20 MG Oral Capsule Delayed Release Take 1 capsule (20 mg total) by mouth every morning. 30 capsule 1 2024    levothyroxine 75 MCG Oral Tab Take 1 tablet (75 mcg total) by mouth before breakfast. 90 tablet 3 2024    PREZCOBIX 800-150 MG Oral Tab Take 1 tablet by mouth daily.   2024    nystatin 952073 UNIT/ML Mouth/Throat Suspension Take 1 mL (100,000 Units total) by mouth 4 (four) times daily. 60 mL 0 2024    DESCOVY 200-25 MG Oral Tab Take 1 tablet by mouth daily.   2024    Budesonide-Formoterol Fumarate (SYMBICORT) 80-4.5 MCG/ACT Inhalation Aerosol Inhale 2 puffs into the lungs 2 (two) times daily. 3 each 1 2024    albuterol (PROAIR HFA) 108 (90 Base) MCG/ACT Inhalation Aero Soln Inhale 2 puffs into the lungs every 6 (six) hours as needed for Wheezing. 1 each 1 2024    levocetirizine 5 MG Oral Tab Take 1 tablet (5 mg total) by mouth every evening. 90 tablet 1 2024    Azelastine HCl 137 MCG/SPRAY Nasal Solution SPRAY 2 SPRAYS BY NASAL ROUTE DAILY 1 each 2 2024    clotrimazole 1 % External Cream APPLY TO RASH ON THE LOWER CHEST AND LOWER ABDOMEN ONCE DAILY INDEFINITELY GENTLY SMALL AMOUNTS   2024    Mometasone Furoate 0.1 % External Cream 1 ring. every 28 days. FOR 21 DAYS IN, THEN REMOVE FOR 7 DAYS   2024    rosuvastatin 10 MG Oral Tab Take 1 tablet (10 mg total) by mouth nightly. 90 tablet 1 More than a month    [] ergocalciferol 1.25 MG (95872 UT) Oral Cap TAKE 1 CAPSULE (50,000 UNITS TOTAL) BY MOUTH ONCE A WEEK FOR 12 DOSES 12 capsule 1     albuterol (VENTOLIN HFA) 108 (90 Base) MCG/ACT Inhalation Aero Soln INHALE 2 PUFFS INTO THE LUNGS EVERY 4 TO 6 HOURS AS NEEDED FOR WHEEZING 18 g 0     Etravirine (INTELENCE) 200 MG  Oral Tab Take 1 tablet by mouth 2 (two) times a day. 180 tablet 0      Current Facility-Administered Medications Ordered in Epic   Medication Dose Route Frequency Provider Last Rate Last Admin    lactated ringers infusion   Intravenous Continuous Antonio Espino MD         No current [facility]-ordered outpatient medications on file.       Allergies   Allergen Reactions    Dust Mite Extract ITCHING       Family History   Problem Relation Age of Onset    Hypertension Father     Diabetes Sister     Other (cervical cancer) Maternal Cousin Male         pre josefa     Social History     Socioeconomic History    Marital status: Single   Tobacco Use    Smoking status: Never    Smokeless tobacco: Never    Tobacco comments:     Pt denies passive smoke exposure in her home.    Vaping Use    Vaping status: Never Used   Substance and Sexual Activity    Alcohol use: Not Currently     Comment: On occasion    Drug use: Never       Available pre-op labs reviewed.  Lab Results   Component Value Date    WBC 6.8 08/10/2024    RBC 4.66 08/10/2024    HGB 13.8 08/10/2024    HCT 41.0 08/10/2024    MCV 88.0 08/10/2024    MCH 29.6 08/10/2024    MCHC 33.7 08/10/2024    RDW 13.6 08/10/2024    .0 08/10/2024     Lab Results   Component Value Date     08/10/2024    K 4.1 08/10/2024     08/10/2024    CO2 27.0 08/10/2024    BUN 10 08/10/2024    CREATSERUM 0.65 08/10/2024    GLU 86 08/10/2024    CA 9.8 08/10/2024          Vital Signs:  Body mass index is 26.96 kg/m².   height is 1.473 m (4' 10\") and weight is 58.5 kg (129 lb). Her blood pressure is 135/68 and her pulse is 68. Her respiration is 14 and oxygen saturation is 99%.   Vitals:    08/05/24 1616 08/12/24 0902   BP:  135/68   Pulse:  68   Resp:  14   SpO2:  99%   Weight: 58.5 kg (129 lb)    Height: 1.473 m (4' 10\")         Anesthesia Evaluation     Patient summary reviewed and Nursing notes reviewed    No history of anesthetic complications   Airway   Mallampati: II  TM  distance: <3 FB  Neck ROM: full  Dental - Dentition appears grossly intact     Pulmonary - normal exam   (+) asthma  Cardiovascular - normal exam    Neuro/Psych    (+)  anxiety/panic attacks,        GI/Hepatic/Renal      Endo/Other    (+) hypothyroidism    Comments: HIV on descovy. CD4 328, VL undetectable  Abdominal                  Anesthesia Plan:   ASA:  2  Plan:   MAC  Informed Consent Plan and Risks Discussed With:  Patient and child/children      I have informed ANGELA Cha and/or legal guardian or family member of the nature of the anesthetic plan, benefits, risks including possible dental damage if relevant, major complications, and any alternative forms of anesthetic management.   All of the patient's questions were answered to the best of my ability. The patient desires the anesthetic management as planned.  Cortez Harrison MD  8/12/2024 9:08 AM  Present on Admission:  **None**

## 2024-08-12 NOTE — H&P
History & Physical Examination    Patient Name: ANGELA Cha  MRN: [id number]  [facility]: [id number]  YOB: 1950    Diagnosis: Weight loss  Change in bowel habits and stools (dark )      Facility-Administered Medications Prior to Admission   Medication Dose Route Frequency Provider Last Rate Last Admin    cyanocobalamin (Vitamin B12) 1000 MCG/ML injection 1,000 mcg  1,000 mcg Intramuscular Q30 Days    1,000 mcg at 07/05/24 1010    [COMPLETED] triamcinolone acetonide (Kenalog-40) 40 MG/ML injection 40 mg  40 mg Intra-articular BID Andrei Guthrie MD   40 mg at 05/16/24 1708     Medications Prior to Admission   Medication Sig Dispense Refill Last Dose    Ferrous Sulfate Dried ER (SLOW RELEASE IRON) 160 (50 Fe) MG Oral Tab CR Take 160 mg by mouth daily. 90 tablet 1 8/11/2024    omeprazole 20 MG Oral Capsule Delayed Release Take 1 capsule (20 mg total) by mouth every morning. 30 capsule 1 8/11/2024    levothyroxine 75 MCG Oral Tab Take 1 tablet (75 mcg total) by mouth before breakfast. 90 tablet 3 8/11/2024    PREZCOBIX 800-150 MG Oral Tab Take 1 tablet by mouth daily.   8/11/2024    nystatin 330480 UNIT/ML Mouth/Throat Suspension Take 1 mL (100,000 Units total) by mouth 4 (four) times daily. 60 mL 0 8/11/2024    DESCOVY 200-25 MG Oral Tab Take 1 tablet by mouth daily.   8/11/2024    Budesonide-Formoterol Fumarate (SYMBICORT) 80-4.5 MCG/ACT Inhalation Aerosol Inhale 2 puffs into the lungs 2 (two) times daily. 3 each 1 8/8/2024    albuterol (PROAIR HFA) 108 (90 Base) MCG/ACT Inhalation Aero Soln Inhale 2 puffs into the lungs every 6 (six) hours as needed for Wheezing. 1 each 1 8/8/2024    levocetirizine 5 MG Oral Tab Take 1 tablet (5 mg total) by mouth every evening. 90 tablet 1 8/11/2024    Azelastine HCl 137 MCG/SPRAY Nasal Solution SPRAY 2 SPRAYS BY NASAL ROUTE DAILY 1 each 2 8/11/2024    clotrimazole 1 % External Cream APPLY TO RASH ON THE LOWER CHEST AND LOWER ABDOMEN ONCE DAILY INDEFINITELY GENTLY  SMALL AMOUNTS   2024    Mometasone Furoate 0.1 % External Cream 1 ring. every 28 days. FOR 21 DAYS IN, THEN REMOVE FOR 7 DAYS   2024    rosuvastatin 10 MG Oral Tab Take 1 tablet (10 mg total) by mouth nightly. 90 tablet 1 More than a month    [] ergocalciferol 1.25 MG (17636 UT) Oral Cap TAKE 1 CAPSULE (50,000 UNITS TOTAL) BY MOUTH ONCE A WEEK FOR 12 DOSES 12 capsule 1     albuterol (VENTOLIN HFA) 108 (90 Base) MCG/ACT Inhalation Aero Soln INHALE 2 PUFFS INTO THE LUNGS EVERY 4 TO 6 HOURS AS NEEDED FOR WHEEZING 18 g 0     Etravirine (INTELENCE) 200 MG Oral Tab Take 1 tablet by mouth 2 (two) times a day. 180 tablet 0      Current Facility-Administered Medications   Medication Dose Route Frequency    lactated ringers infusion   Intravenous Continuous       Allergies:   Allergies   Allergen Reactions    Dust Mite Extract ITCHING       Past Medical History:    Allergic rhinitis    Anxiety state    occasional, no medication    Asthma ([facility])    Disorder of thyroid    hypothyroidism    History of blood transfusion    [facility]    HIV (human immunodeficiency virus infection) ([facility])    HIV (human immunodeficiency virus infection) ([facility])    Hyperlipidemia    Hypothyroidism    Internal hemorrhoids    Tinnitus    Visual impairment    right eye, glasses     Past Surgical History:   Procedure Laterality Date          Cataract      surgery     Colonoscopy N/A 10/20/2017    Procedure: COLONOSCOPY;  Surgeon: Rosalinda Victor MD;  [facility]    Egd  2018        Egd N/A 2024    ;    Eye surgery Right     Tonsillectomy       Family History   Problem Relation Age of Onset    Hypertension Father     Diabetes Sister     Other (cervical cancer) Maternal Cousin Male         pre josefa     Social History     Tobacco Use    Smoking status: Never    Smokeless tobacco: Never    Tobacco comments:     Pt denies passive smoke exposure in her home.    Substance  Use Topics    Alcohol use: Not Currently     Comment: On occasion       SYSTEM Check if Review is Normal Check if Physical Exam is Normal If not normal, please explain:   HEENT [x ] [ x]    NECK & BACK [x ] [x ]    HEART [x ] [ x]    LUNGS [x ] [ x]    ABDOMEN [x ] [x ]    UROGENITAL [ ] [ ]    EXTREMITIES [x ] [x ]    OTHER        [ x ] I have discussed the risks and benefits and alternatives with the patient/family.  They understand and agree to proceed with plan of care.  [ x ] I have reviewed the History and Physical done within the last 30 days.  Any changes noted above.    Antonio Espino MD  8/12/2024  9:27 AM

## 2024-08-12 NOTE — OPERATIVE REPORT
[facility] Endoscopy Report  Date of procedure-August 12, 2024    Preoperative Diagnosis:  -Change in bowel habits/dark stools  -Abdominal pain      Postoperative Diagnosis:  -Hiatal hernia 3 cm  -Irregular Z-line  -Gastritis      Procedure:    Esophagogastroduodenoscopy       Surgeon:  Antonio Espino M.D.    Anesthesia:  MAC     Technique:  After informed consent, the patient was placed in the left lateral recumbent position.  An Olympus adult HD gastroscope was inserted into the hypopharynx and advanced under direct vision into the esophagus, stomach and duodenum.  The endoscope was withdrawn to the stomach where retroflexion of the annulus, body, cardia and fundus was performed.  The instrument was straightened, insufflated air and fluid were suctioned and the endoscope was withdrawn.  The procedure was well tolerated without immediate complication.      Findings:  The esophagus showed a subtly irregular Z-line, biopsies taken at the GE junction.  The GE junction and diaphragmatic impression were at 33 cm and 36 cm for 3 cm hiatal hernia.  The stomach distended appropriately with insufflated air.  The mucosa of the stomach showed gastric erythema in the antrum and body stomach, biopsies taken.  The duodenal bulb and post bulbar regions were normal.    Estimated blood loss-insignificant  Specimens-see above    Impression:  -Hiatal hernia 3 cm  -Irregular Z-line  -Gastritis    Recommendations:  - Post procedure instructions given  - Follow up on upper GI biopsies          Antonio Espino MD  8/12/2024  9:44 AM

## 2024-08-12 NOTE — DISCHARGE INSTRUCTIONS
Home Care Instructions for Gastroscopy with Sedation    Diet:  - Resume your regular diet as tolerated unless otherwise instructed.  - Start with light meals to minimize bloating.  - Do not drink alcohol today.    Medication:  - If you have questions about resuming your normal medications, please contact your Primary Care Physician.    Activities:  - Take it easy today. Do not return to work today.  - Do not drive today.  - Do not operate any machinery today (including kitchen equipment).    Gastroscopy:  - You may have a sore throat for 2-3 days following the exam. This is normal. Gargling with warm salt water (1/2 tsp salt to 1 glass warm water) or using throat lozenges will help.  - If you experience any sharp pain in your neck, abdomen or chest, vomiting of blood, oral temperature over 100 degrees Fahrenheit, light-headedness or dizziness, or any other problems, contact your doctor.    **If unable to reach your doctor, please go to the [facility] Emergency Room**    - Your referring physician will receive a full report of your examination.  - If you do not hear from your doctor's office within two weeks of your biopsy, please call them for your results.    You may be able to see your laboratory results in [employer] between 4 and 7 business days.  In some cases, your physician may not have viewed the results before they are released to [employer].  If you have questions regarding your results contact the physician who ordered the test/exam by phone or via [employer] by choosing \"Ask a Medical Question.\"

## 2024-08-12 NOTE — ANESTHESIA POSTPROCEDURE EVALUATION
Patient: ANGELA Cha    Procedure Summary       Date: 08/12/24 Room / Location: [facility] ENDOSCOPY 01 / [facility] ENDO    Anesthesia Start: 0933 Anesthesia Stop: 0945    Procedure: ESOPHAGOGASTRODUODENOSCOPY Diagnosis:       Weight loss      (Hiatal hernia,gastritis)    Surgeons: Antonio Espino MD Anesthesiologist: Cortez Harrison MD    Anesthesia Type: MAC ASA Status: 2            Anesthesia Type: MAC    Vitals Value Taken Time   /69 08/12/24 1005   Temp  08/12/24 1006   Pulse 67 08/12/24 1006   Resp 17 08/12/24 1006   SpO2 96 % 08/12/24 1006   Vitals shown include unfiled device data.    [facility] AN Post Evaluation:   Patient Evaluated in PACU  Patient Participation: complete - patient participated  Level of Consciousness: sleepy but conscious  Pain Score: 0  Pain Management: adequate  Airway Patency:patent  Yes    Nausea/Vomiting: none  Cardiovascular Status: acceptable  Respiratory Status: acceptable  Postoperative Hydration acceptable      Cortez Harrison MD  8/12/2024 10:06 AM

## 2024-08-13 VITALS
DIASTOLIC BLOOD PRESSURE: 69 MMHG | RESPIRATION RATE: 12 BRPM | OXYGEN SATURATION: 97 % | WEIGHT: 129 LBS | BODY MASS INDEX: 27.08 KG/M2 | HEIGHT: 58 IN | HEART RATE: 64 BPM | SYSTOLIC BLOOD PRESSURE: 113 MMHG

## 2024-08-15 ENCOUNTER — PATIENT MESSAGE (OUTPATIENT)
Facility: CLINIC | Age: 74
End: 2024-08-15

## 2024-08-15 NOTE — TELEPHONE ENCOUNTER
Dr Kamille Mcelroy
Dr. Espino    Please advise on 8/12/24 pathology results.    Thank you  
From: ANGELA Cha  To: Antonio Espino  Sent: 8/15/2024 3:46 PM CDT  Subject: Pathology     What does it mean   
Dr Mcelroy
DBM

## 2024-08-26 RX ORDER — METHYLDOPA 500 MG
160 TABLET ORAL DAILY
Qty: 90 TABLET | Refills: 1 | Status: SHIPPED | OUTPATIENT
Start: 2024-08-26 | End: 2024-11-24

## 2024-08-29 ENCOUNTER — TELEPHONE (OUTPATIENT)
Facility: CLINIC | Age: 74
End: 2024-08-29

## 2024-08-29 NOTE — TELEPHONE ENCOUNTER
Antonio Espino MD   to Yanna Cha         8/22/24  5:31 PM  Yanna  Wanted to get back to with your upper endoscopy results, you do have a hiatal hernia and should avoid eating for 3 hours before bed.  I did take samples from the junction of the esophagus and the stomach and these did show some changes suggestive of Leger's esophagus.  No dysplasia was noted.  I would advise that he continue on omeprazole.     Repeat upper endoscopy in 3 years     Dr. Espino    Last read by Yanna Cha at 10:53 AM on 8/23/2024.

## 2024-08-29 NOTE — TELEPHONE ENCOUNTER
3 year EGD recall entered into patient outreach in [facility].  Next EGD will be due 8/12/2027.

## 2024-09-20 ENCOUNTER — TELEPHONE (OUTPATIENT)
Facility: CLINIC | Age: 74
End: 2024-09-20

## 2024-09-20 NOTE — TELEPHONE ENCOUNTER
2 year EGD recall entered into patient outreach in [facility].  Next EGD will be due 8/12/2026    Patient viewed below result note in MyChart:  Seen by patient Yanna Cha on 9/19/2024  5:02 PM

## 2024-09-20 NOTE — TELEPHONE ENCOUNTER
----- Message from Antonio Espino sent at 9/19/2024  4:28 PM CDT -----  EGD showed a hiatal hernia and Leger's esophagus.    Continue on omeprazole and repeat EGD in 2 years to monitor the Barretts to make sure not changing.

## 2024-10-02 NOTE — TELEPHONE ENCOUNTER
Spoke to pt over the phone, advise given Detail Level: Zone Plan: Use Sunscreen SPF 35 or greater to sun exposed areas daily.

## 2024-11-18 ENCOUNTER — OFFICE VISIT (OUTPATIENT)
Dept: INTERNAL MEDICINE CLINIC | Facility: CLINIC | Age: 74
End: 2024-11-18
Payer: MEDICARE

## 2024-11-18 VITALS
DIASTOLIC BLOOD PRESSURE: 76 MMHG | HEART RATE: 77 BPM | WEIGHT: 128 LBS | BODY MASS INDEX: 26.87 KG/M2 | HEIGHT: 58 IN | OXYGEN SATURATION: 95 % | SYSTOLIC BLOOD PRESSURE: 104 MMHG

## 2024-11-18 DIAGNOSIS — Z12.31 ENCOUNTER FOR SCREENING MAMMOGRAM FOR MALIGNANT NEOPLASM OF BREAST: ICD-10-CM

## 2024-11-18 DIAGNOSIS — L65.9 HAIR LOSS: ICD-10-CM

## 2024-11-18 DIAGNOSIS — R74.8 ELEVATED LIPASE: ICD-10-CM

## 2024-11-18 DIAGNOSIS — F32.1 MODERATE MAJOR DEPRESSION (HCC): ICD-10-CM

## 2024-11-18 DIAGNOSIS — Z78.0 ASYMPTOMATIC MENOPAUSE: ICD-10-CM

## 2024-11-18 DIAGNOSIS — R06.83 SNORING: ICD-10-CM

## 2024-11-18 DIAGNOSIS — E03.9 HYPOTHYROIDISM, UNSPECIFIED TYPE: ICD-10-CM

## 2024-11-18 DIAGNOSIS — Z00.00 ANNUAL PHYSICAL EXAM: Primary | ICD-10-CM

## 2024-11-18 DIAGNOSIS — R21 RASH: ICD-10-CM

## 2024-11-18 DIAGNOSIS — B20 HUMAN IMMUNODEFICIENCY VIRUS (HIV) DISEASE (HCC): ICD-10-CM

## 2024-11-18 DIAGNOSIS — R19.7 DIARRHEA, UNSPECIFIED TYPE: ICD-10-CM

## 2024-11-18 PROBLEM — R63.4 UNINTENTIONAL WEIGHT LOSS: Status: RESOLVED | Noted: 2024-06-27 | Resolved: 2024-11-18

## 2024-11-18 RX ORDER — ERGOCALCIFEROL 1.25 MG/1
50000 CAPSULE, LIQUID FILLED ORAL WEEKLY
COMMUNITY
Start: 2024-02-19

## 2024-11-18 RX ORDER — KETOCONAZOLE 20 MG/G
CREAM TOPICAL
COMMUNITY

## 2024-11-18 RX ORDER — CLOTRIMAZOLE AND BETAMETHASONE DIPROPIONATE 10; .64 MG/G; MG/G
1 CREAM TOPICAL 2 TIMES DAILY PRN
Qty: 15 G | Refills: 1 | Status: SHIPPED | OUTPATIENT
Start: 2024-11-18

## 2024-11-18 RX ORDER — ROSUVASTATIN CALCIUM 10 MG/1
10 TABLET, COATED ORAL NIGHTLY
COMMUNITY
Start: 2024-10-15

## 2024-11-18 NOTE — PROGRESS NOTES
Subjective:   ANGELA Cha is a 74 year old female who presents for a Subsequent Annual Wellness visit (Pt already had Initial Annual Wellness)   Rash on her neck and back   Started 2 weeks ago   Itchy   No pain   She is using calamine lotion   Aveeno bath things   She is using OTC hydrocortisone         No chest pain no sob no abdominal pain  Diarrhea and not able to hold it at times   No black stool or blood in the stool   Started 3 days ago   No recent antibiotic use   Today went 4 times   Yesterday went 2ce   Loose stool   Cramps   No recent travel   No fever or chills   No urinary complaints                  No smoking   Alcohol ; 1ce per week glass of wine   Family history of cancer : no     History/Other:   Fall Risk Assessment:   She has been screened for Falls and is High Risk. Fall Prevention information provided to patient in After Visit Summary.    Do you feel unsteady when standing or walking?: (Patient-Rptd) No  Do you worry about falling?: (Patient-Rptd) Yes  Have you fallen in the past year?: (Patient-Rptd) No     Cognitive Assessment:   She had a completely normal cognitive assessment - see flowsheet entries     Functional Ability/Status:   ANGELA Cha has some abnormal functions as listed below:  She has Driving difficulties based on screening of functional status. She has Hearing problems based on screening of functional status.She has Vision problems based on screening of functional status. She has problems with Memory based on screening of functional status.       Depression Screening (PHQ):  PHQ-9 TOTAL SCORE: 9  , done 2024   Feeling down, depressed, or hopeless: 3    Trouble falling or staying asleep, or sleeping too much: 1     Feeling tired or having little energy: 3    Poor appetite or overeatin    Trouble concentrating on things, such as reading the newspaper or watching television: 1    If you checked off any problems, how difficult have these problems made it for  you to do your work, take care of things at home, or get along with other people?: Somewhat difficult           PATIENT HEALTH QUESTIONNAIRE (PHQ-4)   Over the past 2 weeks have you been bothered by these problems? Not at all Several days More days than not Nearly every day    Feeling nervous, anxious, or on edge 0 1 2 3 3   Not being able to stop or control worrying 0 1 2 3 3   Feeling down, depressed, or hopeless 0 1 2 3 3   Little interest or pleasure in doing things 0 1 2 3 0   Total Score 9   The thought of harming myself has occurred to me: Yes No Answer:      no   * A PHQ-9 must be done for any score in the severe range (9-12)  Key: normal (0-2), mild (3-5), moderate (6-8), severe (9-12)   Advanced Directives:   She does NOT have a Living Will. [Do you have a living will?: (Patient-Rptd) No]  She does have a POA but we do NOT have it on file in Epic.        Patient Active Problem List   Diagnosis    Human immunodeficiency virus (HIV) disease (HCC)    Hypothyroidism, unspecified type    Hair loss    Snoring     Allergies:  She is allergic to dust mite extract.    Current Medications:  Outpatient Medications Marked as Taking for the 11/18/24 encounter (Office Visit) with Silver Ugarte MD   Medication Sig    rosuvastatin 10 MG Oral Tab Take 1 tablet (10 mg total) by mouth nightly.    ergocalciferol 1.25 MG (89341 UT) Oral Cap Take 1 capsule (50,000 Units total) by mouth once a week.    ketoconazole 2 % External Cream APPLY TO THE AFFECTED AREA(S) BY TOPICAL ROUTE ONCE DAILY TO BOTTOM OF FEET AND TOES    clotrimazole-betamethasone 1-0.05 % External Cream Apply 1 Application topically 2 (two) times daily as needed.    sertraline 50 MG Oral Tab Take 1 tablet (50 mg total) by mouth daily.    Ferrous Sulfate Dried ER (SLOW RELEASE IRON) 160 (50 Fe) MG Oral Tab CR Take 160 mg by mouth daily.    omeprazole 20 MG Oral Capsule Delayed Release Take 1 capsule (20 mg total) by mouth every morning.    levothyroxine 75 MCG  Oral Tab Take 1 tablet (75 mcg total) by mouth before breakfast.    PREZCOBIX 800-150 MG Oral Tab Take 1 tablet by mouth daily.    nystatin 566567 UNIT/ML Mouth/Throat Suspension Take 1 mL (100,000 Units total) by mouth 4 (four) times daily.    DESCOVY 200-25 MG Oral Tab Take 1 tablet by mouth daily.    Budesonide-Formoterol Fumarate (SYMBICORT) 80-4.5 MCG/ACT Inhalation Aerosol Inhale 2 puffs into the lungs 2 (two) times daily.    levocetirizine 5 MG Oral Tab Take 1 tablet (5 mg total) by mouth every evening.    Azelastine HCl 137 MCG/SPRAY Nasal Solution SPRAY 2 SPRAYS BY NASAL ROUTE DAILY    clotrimazole 1 % External Cream APPLY TO RASH ON THE LOWER CHEST AND LOWER ABDOMEN ONCE DAILY INDEFINITELY GENTLY SMALL AMOUNTS    Mometasone Furoate 0.1 % External Cream 1 ring. every 28 days. FOR 21 DAYS IN, THEN REMOVE FOR 7 DAYS       Medical History:  She  has a past medical history of Allergic rhinitis, Anxiety state, Asthma (), Disorder of thyroid, History of blood transfusion (), HIV (human immunodeficiency virus infection) ([facility]), HIV (human immunodeficiency virus infection) ([facility]), Hyperlipidemia (2020), Hypothyroidism, Internal hemorrhoids (10/20/2017), Tinnitus, Unintentional weight loss (2024), and Visual impairment.  Surgical History:  She  has a past surgical history that includes ; egd (2018); Eye surgery (Right, ); cataract; colonoscopy (N/A, 10/20/2017); tonsillectomy; and egd (N/A, 2024).   Family History:  Her family history includes Diabetes in her sister; Hypertension in her father; cervical cancer in her maternal cousin male.  Social History:  She  reports that she has never smoked. She has never used smokeless tobacco. She reports that she does not currently use alcohol. She reports that she does not use drugs.    Tobacco:  She has never smoked tobacco.    CAGE Alcohol Screen:   CAGE screening score of 0 on 2024, showing low risk of alcohol abuse.      Patient  Care Team:  Silver Ugarte MD as PCP - General (Internal Medicine)  Ruby Archibald CMA as [facility] Care Manager  Regine Desouza DO (INFECTIOUS DISEASES)  Benoit Young MD (Allergy and Immunology)  Lissy Do PA-C (Physician Assistant)  Светлана Schneider PT as Physical Therapist (Physical Therapy)    Review of Systems  A comprehensive 10 point review of systems was completed.  Pertinent positives and negatives noted in the the HPI.     Objective:   Physical Exam  GENERAL: well developed, well nourished,in no apparent distress  SKIN:hypopigmented area and rash on her neck extending to the shoulder   No vesicles   HEENT: atraumatic, normocephalic,throat are clear  EYES:Conjunctiva are clear  NECK: supple,no adenopathy  BREAST: no dominant or suspicious mass, no nipple discharge  Rash under the breast  LUNGS: clear to auscultation  CARDIO: RRR without murmur  GI: good BS's,no masses, HSM or tenderness  EXTREMITIES: no edema     /76   Pulse 77   Ht 4' 10\" (1.473 m)   Wt 128 lb (58.1 kg)   LMP  (LMP Unknown)   SpO2 95%   BMI 26.75 kg/m²  Estimated body mass index is 26.75 kg/m² as calculated from the following:    Height as of this encounter: 4' 10\" (1.473 m).    Weight as of this encounter: 128 lb (58.1 kg).    Medicare Hearing Assessment:   Decreased hearing       Visual Acuity:   Right Eye Visual Acuity: Corrected Right Eye Chart Acuity: 20/30   Left Eye Visual Acuity: Corrected Left Eye Chart Acuity: 20/30   Both Eyes Visual Acuity: Corrected Both Eyes Chart Acuity: 20/30   Able To Tolerate Visual Acuity: Yes        Assessment & Plan:   ANGELA Cha is a 74 year old female who presents for a Medicare Assessment.     1. Annual physical exam  Diet and exercise   Self breast exam   Sun screen recommended   Fasting blood work   Mammogram   Dexa scan   She is up to date with [facility] SUDHA 2D+3D SCREENING BILAT (CPT=77067/62186); Future  - XR DEXA BONE DENSITOMETRY (CPT=77080);  Future  - CBC With Differential With Platelet; Future  - Comp Metabolic Panel (14); Future  - Lipid Panel; Future  - TSH W Reflex To Free T4; Future  - Lipase [E]; Future  - Sed Rate, Westergren (Automated); Future  - C-Reactive Protein [E]; Future  - C. diff toxigenic PCR (OPT) [E]; Future  - Calprotectin, Fecal; Future  - Stool Culture w/Shigatoxin [E]; Future  - [facility] Navigator  - Iron And Tibc; Future  - Ferritin; Future  - Derm Referral - In Network    2. Encounter for screening mammogram for malignant neoplasm of breast  Mammogram screening   - [facility] SUDHA 2D+3D SCREENING BILAT (CPT=77067/29458); Future  - XR DEXA BONE DENSITOMETRY (CPT=77080); Future  - CBC With Differential With Platelet; Future  - Comp Metabolic Panel (14); Future  - Lipid Panel; Future  - TSH W Reflex To Free T4; Future  - Lipase [E]; Future  - Sed Rate, Westergren (Automated); Future  - C-Reactive Protein [E]; Future  - C. diff toxigenic PCR (OPT) [E]; Future  - Calprotectin, Fecal; Future  - Stool Culture w/Shigatoxin [E]; Future  - Twan Galt Navigator  - Iron And Tibc; Future  - Ferritin; Future  - Derm Referral - In Network    3. Asymptomatic menopause  DExa scan   - [facility] SUDHA 2D+3D SCREENING BILAT (CPT=77067/71214); Future  - XR DEXA BONE DENSITOMETRY (CPT=77080); Future  - CBC With Differential With Platelet; Future  - Comp Metabolic Panel (14); Future  - Lipid Panel; Future  - TSH W Reflex To Free T4; Future  - Lipase [E]; Future  - Sed Rate, Westergren (Automated); Future  - C-Reactive Protein [E]; Future  - C. diff toxigenic PCR (OPT) [E]; Future  - Calprotectin, Fecal; Future  - Stool Culture w/Shigatoxin [E]; Future  - Iron And Tibc; Future  - Ferritin; Future  - Derm Referral - In Network    4. Elevated lipase  Repeat lipase     - CBC With Differential With Platelet; Future  - Comp Metabolic Panel (14); Future  - Lipid Panel; Future  - TSH W Reflex To Free T4; Future  - Lipase [E]; Future  - Sed Rate, Westergren  (Automated); Future  - C-Reactive Protein [E]; Future  - C. diff toxigenic PCR (OPT) [E]; Future  - Calprotectin, Fecal; Future  - Stool Culture w/Shigatoxin [E]; Future  - Twan Gomezator  - Iron And Tibc; Future  - Ferritin; Future  - Derm Referral - In Network    5. Human immunodeficiency virus (HIV) disease (HCC)  Follow up with ID   Continue current medications     6. Hypothyroidism, unspecified type  Tsh level     7. Hair loss  Tsh   Iron and ferritin       8. Snoring  Printed the hand out for sleep specialist referral     9. 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  no      Return in about 3 months (around 2/18/2025) for or the first available .     Silver Ugarte MD, 11/18/2024     Supplementary Documentation:   General Health:  In the past six months, have you lost more than 10 pounds without trying?: (Patient-Rptd) 1 - Yes  Has your appetite been poor?: (Patient-Rptd) No  Type of Diet: (Patient-Rptd) Balanced  How does the patient maintain a good energy level?: (Patient-Rptd) Daily Walks  How would you describe your daily physical activity?: (Patient-Rptd) Light  How would you describe your current health state?: (Patient-Rptd) Good  How do you maintain positive mental well-being?: (Patient-Rptd) Games  On a scale of 0 to 10, with 0 being no pain and 10 being severe pain, what is your pain level?: (Patient-Rptd) 1 - (Mild)  In the past six months, have you experienced urine  leakage?: (Patient-Rptd) 0-No  At any time do you feel concerned for the safety/well-being of yourself and/or your children, in your home or elsewhere?: (Patient-Rptd) No  Have you had any immunizations at another office such as Influenza, Hepatitis B, Tetanus, or Pneumococcal?: (Patient-Rptd) Yes    Health Maintenance   Topic Date Due    Zoster Vaccines (1 of 2) Never done    MA Annual Health Assessment  01/01/2024    Annual Depression Screening  01/01/2024    Mammogram  05/03/2024    COVID-19 Vaccine (3 - 2024-25 season) 09/01/2024    Colorectal Cancer Screening  10/23/2027    Influenza Vaccine  Completed    DEXA Scan  Completed    Fall Risk Screening (Annual)  Completed    Pneumococcal Vaccine: 65+ Years  Completed

## 2024-11-19 ENCOUNTER — TELEPHONE (OUTPATIENT)
Age: 74
End: 2024-11-19

## 2024-11-19 NOTE — TELEPHONE ENCOUNTER
Hello,     The [facility] Navigation team has attempted to reach you regarding an order from Dr. Ugarte's office. We are reaching out in order to assist you in coordinating care and resources that may meet your needs. Please give our office a call at [phone]. For more immediate assistance you can contact our 24-hour help line at [phone]. We look forward to hearing from you soon.

## 2024-11-22 ENCOUNTER — OFFICE VISIT (OUTPATIENT)
Dept: DERMATOLOGY CLINIC | Facility: CLINIC | Age: 74
End: 2024-11-22
Payer: MEDICARE

## 2024-11-22 DIAGNOSIS — L65.9 HAIR LOSS: ICD-10-CM

## 2024-11-22 DIAGNOSIS — L30.9 ECZEMA, UNSPECIFIED TYPE: Primary | ICD-10-CM

## 2024-11-22 PROCEDURE — 1159F MED LIST DOCD IN RCRD: CPT | Performed by: PHYSICIAN ASSISTANT

## 2024-11-22 PROCEDURE — 99203 OFFICE O/P NEW LOW 30 MIN: CPT | Performed by: PHYSICIAN ASSISTANT

## 2024-11-22 RX ORDER — TRIAMCINOLONE ACETONIDE 1 MG/G
1 OINTMENT TOPICAL 2 TIMES DAILY
Qty: 80 G | Refills: 0 | Status: SHIPPED | OUTPATIENT
Start: 2024-11-22

## 2024-11-22 RX ORDER — CLOBETASOL PROPIONATE 0.5 MG/ML
1 SOLUTION TOPICAL
Qty: 60 ML | Refills: 3 | Status: SHIPPED | OUTPATIENT
Start: 2024-11-22

## 2024-11-22 NOTE — PROGRESS NOTES
HPI:    Patient ID: ANGELA Cha is a 74 year old female.    Patient presents for skin irritation on neck and down spine. Notes, the irritation is \"very itchy and recurring\". There is discoloration, and starting to spread to her scalp. Causing it to be itchy as well. Denies dandruff issues. Using calamine lotion and cortisone with no improvement. No draining or tenderness noted. She notes hair loss in the front and in the back of scalp. Notes it started after losing weight. No allergies to medications noted.         Review of Systems   Constitutional:  Negative for chills and fever.   Musculoskeletal:  Negative for arthralgias and myalgias.   Skin:  Positive for rash. Negative for color change and wound.            Current Outpatient Medications   Medication Sig Dispense Refill    clobetasol 0.05 % External Solution Apply 1 mL topically daily as needed. 60 mL 3    triamcinolone 0.1 % External Ointment Apply 1 Application topically 2 (two) times daily. 80 g 0    rosuvastatin 10 MG Oral Tab Take 1 tablet (10 mg total) by mouth nightly.      ketoconazole 2 % External Cream APPLY TO THE AFFECTED AREA(S) BY TOPICAL ROUTE ONCE DAILY TO BOTTOM OF FEET AND TOES      clotrimazole-betamethasone 1-0.05 % External Cream Apply 1 Application topically 2 (two) times daily as needed. 15 g 1    sertraline 50 MG Oral Tab Take 1 tablet (50 mg total) by mouth daily. 90 tablet 0    Ferrous Sulfate Dried ER (SLOW RELEASE IRON) 160 (50 Fe) MG Oral Tab CR Take 160 mg by mouth daily. 90 tablet 1    omeprazole 20 MG Oral Capsule Delayed Release Take 1 capsule (20 mg total) by mouth every morning. 30 capsule 1    levothyroxine 75 MCG Oral Tab Take 1 tablet (75 mcg total) by mouth before breakfast. 90 tablet 3    PREZCOBIX 800-150 MG Oral Tab Take 1 tablet by mouth daily.      nystatin 992753 UNIT/ML Mouth/Throat Suspension Take 1 mL (100,000 Units total) by mouth 4 (four) times daily. 60 mL 0    DESCOVY 200-25 MG Oral Tab Take 1 tablet  by mouth daily.      Budesonide-Formoterol Fumarate (SYMBICORT) 80-4.5 MCG/ACT Inhalation Aerosol Inhale 2 puffs into the lungs 2 (two) times daily. 3 each 1    levocetirizine 5 MG Oral Tab Take 1 tablet (5 mg total) by mouth every evening. 90 tablet 1    Azelastine HCl 137 MCG/SPRAY Nasal Solution SPRAY 2 SPRAYS BY NASAL ROUTE DAILY 1 each 2    clotrimazole 1 % External Cream APPLY TO RASH ON THE LOWER CHEST AND LOWER ABDOMEN ONCE DAILY INDEFINITELY GENTLY SMALL AMOUNTS      Mometasone Furoate 0.1 % External Cream 1 ring. every 28 days. FOR 21 DAYS IN, THEN REMOVE FOR 7 DAYS      ergocalciferol 1.25 MG (41112 UT) Oral Cap Take 1 capsule (50,000 Units total) by mouth once a week.      Etravirine (INTELENCE) 200 MG Oral Tab Take 1 tablet by mouth 2 (two) times a day. 180 tablet 0     Allergies:Allergies[1]   LMP  (LMP Unknown)   There is no height or weight on file to calculate BMI.  PHYSICAL EXAM:   Physical Exam  Constitutional:       General: She is not in acute distress.     Appearance: Normal appearance.   Skin:     General: Skin is warm and dry.      Findings: Rash present.      Comments: Scaling erythematous rash noted on the upper back on the left side. No drainign or tenderness noted. Excoriations noted.     Hair loss noted on the front of the scalp and the back of the scalp. No draining or tenderness noted. Nos caling noted. No erythema noted.    Neurological:      Mental Status: She is alert and oriented to person, place, and time.                ASSESSMENT/PLAN:   1. Eczema, unspecified type  -After discussion with patient, advised the following:  -Start triamcionlone  -Educated to apply 2 times per day for the next 2 weeks.   -If not improving then should let me know  -Most likely due to weather changes.   -To call or follow-up with worsening symptoms or concerns  -Patient was agreeable to plan and will comply with discussion above.     2. Hair loss  -After discussion with patient, advised the  following:  -Started clobetasol   -Educated to apply daily at night   _Can apply in the front of the scalp and the back   -Vitamin D is normal.   -To call or follow-up with worsening symptoms or concerns  -Patient was agreeable to plan and will comply with discussion above.         No orders of the defined types were placed in this encounter.      Meds This Visit:  Requested Prescriptions     Signed Prescriptions Disp Refills    clobetasol 0.05 % External Solution 60 mL 3     Sig: Apply 1 mL topically daily as needed.    triamcinolone 0.1 % External Ointment 80 g 0     Sig: Apply 1 Application topically 2 (two) times daily.       Imaging & Referrals:  None         ID#[id number]       [1]   Allergies  Allergen Reactions    Dust Mite Extract ITCHING

## 2024-11-24 ENCOUNTER — PATIENT MESSAGE (OUTPATIENT)
Dept: DERMATOLOGY CLINIC | Facility: CLINIC | Age: 74
End: 2024-11-24

## 2024-11-27 ENCOUNTER — TELEPHONE (OUTPATIENT)
Age: 74
End: 2024-11-27

## 2024-11-27 NOTE — TELEPHONE ENCOUNTER
Hello - I am reaching out from the [facility] Navigation department, following up on an order from your provider's office to assist in connecting you with resources for care. If you would like to discuss this further, please give us a call at [phone], or for more immediate assistance you can contact our 24-hour help line at [phone]. We look forward to hearing from you soon.

## 2024-11-27 NOTE — TELEPHONE ENCOUNTER
Hello,  I am glad that we were able to connect today. Here are some therapy resources that may be a good fit for you. Based on their online profiles, they are in network with your current insurance, [employer] Gold [employer] PPO. Please verify your insurance coverage with any providers that you may choose to call and schedule with directly. If you have any other questions, please give me a call at [phone]. If you need more immediate assistance, or assistance outside of business hours, please contact the [facility] 24/7 helpline at [phone].  As discussed, all options provide virtual options:  [facility]-Lo Chang, [facility]  Dang Felix, [facility]  Radha Garcia, [facility]  [address], [facility] [address]  [place], [place] [id number] [phone]  [facility]-Rohit Neff, [facility]  Roseline Brito, [facility]  [address] [place] [address]  [phone]  [facility]-RAHUL Bose PRINCESS Sweeney, [facility]  [address] [address]  [phone]  Ritika Chow [facility]  Behavioral Health Navigator   [facility]   24/7 Crisis Line [phone]

## 2025-01-12 DIAGNOSIS — R74.8 ELEVATED LIPASE: ICD-10-CM

## 2025-01-12 DIAGNOSIS — R19.7 DIARRHEA, UNSPECIFIED TYPE: ICD-10-CM

## 2025-01-12 DIAGNOSIS — L65.9 HAIR LOSS: ICD-10-CM

## 2025-01-12 DIAGNOSIS — Z00.00 ANNUAL PHYSICAL EXAM: ICD-10-CM

## 2025-01-12 DIAGNOSIS — E03.9 HYPOTHYROIDISM, UNSPECIFIED TYPE: ICD-10-CM

## 2025-01-12 DIAGNOSIS — Z78.0 ASYMPTOMATIC MENOPAUSE: ICD-10-CM

## 2025-01-12 DIAGNOSIS — B20 HUMAN IMMUNODEFICIENCY VIRUS (HIV) DISEASE (HCC): ICD-10-CM

## 2025-01-12 DIAGNOSIS — R06.83 SNORING: ICD-10-CM

## 2025-01-12 DIAGNOSIS — Z12.31 ENCOUNTER FOR SCREENING MAMMOGRAM FOR MALIGNANT NEOPLASM OF BREAST: ICD-10-CM

## 2025-01-12 DIAGNOSIS — R21 RASH: ICD-10-CM

## 2025-01-12 DIAGNOSIS — F32.1 MODERATE MAJOR DEPRESSION (HCC): ICD-10-CM

## 2025-01-13 RX ORDER — ROSUVASTATIN CALCIUM 10 MG/1
10 TABLET, COATED ORAL NIGHTLY
Qty: 90 TABLET | Refills: 1 | Status: SHIPPED | OUTPATIENT
Start: 2025-01-13

## 2025-02-03 RX ORDER — AZELASTINE HYDROCHLORIDE 137 UG/1
2 SPRAY, METERED NASAL DAILY
Qty: 1 EACH | Refills: 2 | Status: SHIPPED | OUTPATIENT
Start: 2025-02-03

## 2025-02-03 NOTE — TELEPHONE ENCOUNTER
Refill requested for Refills have been requested for the following medications:         Azelastine HCl 137 MCG/SPRAY Nasal Solution [Benoit Young]     Preferred pharmacy: [facility]ACY [id number], [address] AT [facility], [phone], [phone]     Last office visit: 12/02/2023    Previously advised to follow up in asthma on daily controller. Six month follow up    F/U currently scheduled? Not at this time       ACTION: [employer] message sent to patient. She is overdue to see Dr. Young. Will await appt to be scheduled.

## 2025-02-17 DIAGNOSIS — Z78.0 ASYMPTOMATIC MENOPAUSE: ICD-10-CM

## 2025-02-17 DIAGNOSIS — E03.9 HYPOTHYROIDISM, UNSPECIFIED TYPE: ICD-10-CM

## 2025-02-17 DIAGNOSIS — R74.8 ELEVATED LIPASE: ICD-10-CM

## 2025-02-17 DIAGNOSIS — R06.83 SNORING: ICD-10-CM

## 2025-02-17 DIAGNOSIS — Z12.31 ENCOUNTER FOR SCREENING MAMMOGRAM FOR MALIGNANT NEOPLASM OF BREAST: ICD-10-CM

## 2025-02-17 DIAGNOSIS — B20 HUMAN IMMUNODEFICIENCY VIRUS (HIV) DISEASE (HCC): ICD-10-CM

## 2025-02-17 DIAGNOSIS — F32.1 MODERATE MAJOR DEPRESSION (HCC): ICD-10-CM

## 2025-02-17 DIAGNOSIS — Z00.00 ANNUAL PHYSICAL EXAM: ICD-10-CM

## 2025-02-17 DIAGNOSIS — R21 RASH: ICD-10-CM

## 2025-02-17 DIAGNOSIS — L65.9 HAIR LOSS: ICD-10-CM

## 2025-02-17 DIAGNOSIS — R19.7 DIARRHEA, UNSPECIFIED TYPE: ICD-10-CM

## 2025-03-07 ENCOUNTER — LAB ENCOUNTER (OUTPATIENT)
Dept: LAB | Age: 75
End: 2025-03-07
Attending: INTERNAL MEDICINE
Payer: MEDICARE

## 2025-03-07 DIAGNOSIS — E03.9 HYPOTHYROIDISM, UNSPECIFIED TYPE: ICD-10-CM

## 2025-03-07 DIAGNOSIS — F32.1 MODERATE MAJOR DEPRESSION (HCC): ICD-10-CM

## 2025-03-07 DIAGNOSIS — R21 RASH: ICD-10-CM

## 2025-03-07 DIAGNOSIS — R19.7 DIARRHEA, UNSPECIFIED TYPE: ICD-10-CM

## 2025-03-07 DIAGNOSIS — Z00.00 ANNUAL PHYSICAL EXAM: ICD-10-CM

## 2025-03-07 DIAGNOSIS — L65.9 HAIR LOSS: ICD-10-CM

## 2025-03-07 DIAGNOSIS — R06.83 SNORING: ICD-10-CM

## 2025-03-07 DIAGNOSIS — Z12.31 ENCOUNTER FOR SCREENING MAMMOGRAM FOR MALIGNANT NEOPLASM OF BREAST: ICD-10-CM

## 2025-03-07 DIAGNOSIS — B20 HUMAN IMMUNODEFICIENCY VIRUS (HIV) DISEASE (HCC): ICD-10-CM

## 2025-03-07 DIAGNOSIS — Z78.0 ASYMPTOMATIC MENOPAUSE: ICD-10-CM

## 2025-03-07 DIAGNOSIS — R74.8 ELEVATED LIPASE: ICD-10-CM

## 2025-03-07 LAB
ALBUMIN SERPL-MCNC: 4.4 G/DL (ref 3.2–4.8)
ALBUMIN/GLOB SERPL: 2 {RATIO} (ref 1–2)
ALP LIVER SERPL-CCNC: 107 U/L
ALT SERPL-CCNC: 13 U/L
ANION GAP SERPL CALC-SCNC: 9 MMOL/L (ref 0–18)
AST SERPL-CCNC: 19 U/L (ref ?–34)
BASOPHILS # BLD AUTO: 0.04 X10(3) UL (ref 0–0.2)
BASOPHILS NFR BLD AUTO: 0.9 %
BILIRUB SERPL-MCNC: 0.5 MG/DL (ref 0.2–1.1)
BUN BLD-MCNC: 11 MG/DL (ref 9–23)
BUN/CREAT SERPL: 16.2 (ref 10–20)
CALCIUM BLD-MCNC: 8.9 MG/DL (ref 8.7–10.4)
CHLORIDE SERPL-SCNC: 108 MMOL/L (ref 98–112)
CHOLEST SERPL-MCNC: 185 MG/DL (ref ?–200)
CO2 SERPL-SCNC: 28 MMOL/L (ref 21–32)
CREAT BLD-MCNC: 0.68 MG/DL
CRP SERPL-MCNC: <0.4 MG/DL (ref ?–1)
DEPRECATED HBV CORE AB SER IA-ACNC: 16 NG/ML
DEPRECATED RDW RBC AUTO: 38.4 FL (ref 35.1–46.3)
EGFRCR SERPLBLD CKD-EPI 2021: 92 ML/MIN/1.73M2 (ref 60–?)
EOSINOPHIL # BLD AUTO: 0.06 X10(3) UL (ref 0–0.7)
EOSINOPHIL NFR BLD AUTO: 1.4 %
ERYTHROCYTE [DISTWIDTH] IN BLOOD BY AUTOMATED COUNT: 11.9 % (ref 11–15)
ERYTHROCYTE [SEDIMENTATION RATE] IN BLOOD: 10 MM/HR
FASTING PATIENT LIPID ANSWER: YES
FASTING STATUS PATIENT QL REPORTED: YES
GLOBULIN PLAS-MCNC: 2.2 G/DL (ref 2–3.5)
GLUCOSE BLD-MCNC: 93 MG/DL (ref 70–99)
HCT VFR BLD AUTO: 40.6 %
HDLC SERPL-MCNC: 58 MG/DL (ref 40–59)
HGB BLD-MCNC: 13.5 G/DL
IMM GRANULOCYTES # BLD AUTO: 0.02 X10(3) UL (ref 0–1)
IMM GRANULOCYTES NFR BLD: 0.5 %
IRON SATN MFR SERPL: 20 %
IRON SERPL-MCNC: 68 UG/DL
LDLC SERPL CALC-MCNC: 85 MG/DL (ref ?–100)
LIPASE SERPL-CCNC: 39 U/L (ref 12–53)
LYMPHOCYTES # BLD AUTO: 1.2 X10(3) UL (ref 1–4)
LYMPHOCYTES NFR BLD AUTO: 27.5 %
MCH RBC QN AUTO: 29.3 PG (ref 26–34)
MCHC RBC AUTO-ENTMCNC: 33.3 G/DL (ref 31–37)
MCV RBC AUTO: 88.3 FL
MONOCYTES # BLD AUTO: 0.56 X10(3) UL (ref 0.1–1)
MONOCYTES NFR BLD AUTO: 12.8 %
NEUTROPHILS # BLD AUTO: 2.49 X10 (3) UL (ref 1.5–7.7)
NEUTROPHILS # BLD AUTO: 2.49 X10(3) UL (ref 1.5–7.7)
NEUTROPHILS NFR BLD AUTO: 56.9 %
NONHDLC SERPL-MCNC: 127 MG/DL (ref ?–130)
OSMOLALITY SERPL CALC.SUM OF ELEC: 299 MOSM/KG (ref 275–295)
PLATELET # BLD AUTO: 198 10(3)UL (ref 150–450)
POTASSIUM SERPL-SCNC: 4.2 MMOL/L (ref 3.5–5.1)
PROT SERPL-MCNC: 6.6 G/DL (ref 5.7–8.2)
RBC # BLD AUTO: 4.6 X10(6)UL
SODIUM SERPL-SCNC: 145 MMOL/L (ref 136–145)
TOTAL IRON BINDING CAPACITY: 348 UG/DL (ref 250–425)
TRANSFERRIN SERPL-MCNC: 272 MG/DL (ref 250–380)
TRIGL SERPL-MCNC: 257 MG/DL (ref 30–149)
TSI SER-ACNC: 1.36 UIU/ML (ref 0.55–4.78)
VLDLC SERPL CALC-MCNC: 41 MG/DL (ref 0–30)
WBC # BLD AUTO: 4.4 X10(3) UL (ref 4–11)

## 2025-03-07 PROCEDURE — 83690 ASSAY OF LIPASE: CPT

## 2025-03-07 PROCEDURE — 80061 LIPID PANEL: CPT

## 2025-03-07 PROCEDURE — 85025 COMPLETE CBC W/AUTO DIFF WBC: CPT

## 2025-03-07 PROCEDURE — 84443 ASSAY THYROID STIM HORMONE: CPT

## 2025-03-07 PROCEDURE — 82728 ASSAY OF FERRITIN: CPT

## 2025-03-07 PROCEDURE — 36415 COLL VENOUS BLD VENIPUNCTURE: CPT

## 2025-03-07 PROCEDURE — 83540 ASSAY OF IRON: CPT

## 2025-03-07 PROCEDURE — 84466 ASSAY OF TRANSFERRIN: CPT

## 2025-03-07 PROCEDURE — 80053 COMPREHEN METABOLIC PANEL: CPT

## 2025-03-07 PROCEDURE — 86140 C-REACTIVE PROTEIN: CPT

## 2025-03-07 PROCEDURE — 85652 RBC SED RATE AUTOMATED: CPT

## 2025-03-10 ENCOUNTER — TELEPHONE (OUTPATIENT)
Dept: INTERNAL MEDICINE CLINIC | Facility: CLINIC | Age: 75
End: 2025-03-10

## 2025-03-10 DIAGNOSIS — E61.1 IRON DEFICIENCY: Primary | ICD-10-CM

## 2025-03-10 DIAGNOSIS — D50.9 IRON DEFICIENCY ANEMIA, UNSPECIFIED IRON DEFICIENCY ANEMIA TYPE: ICD-10-CM

## 2025-03-11 PROBLEM — E61.1 IRON DEFICIENCY: Status: ACTIVE | Noted: 2025-03-11

## 2025-03-18 ENCOUNTER — TELEPHONE (OUTPATIENT)
Age: 75
End: 2025-03-18

## 2025-03-19 ENCOUNTER — OFFICE VISIT (OUTPATIENT)
Dept: INTERNAL MEDICINE CLINIC | Facility: CLINIC | Age: 75
End: 2025-03-19
Payer: MEDICARE

## 2025-03-19 VITALS
HEART RATE: 76 BPM | OXYGEN SATURATION: 97 % | SYSTOLIC BLOOD PRESSURE: 120 MMHG | WEIGHT: 131 LBS | HEIGHT: 58 IN | BODY MASS INDEX: 27.5 KG/M2 | DIASTOLIC BLOOD PRESSURE: 76 MMHG

## 2025-03-19 DIAGNOSIS — R53.83 OTHER FATIGUE: ICD-10-CM

## 2025-03-19 DIAGNOSIS — E61.1 IRON DEFICIENCY: Primary | ICD-10-CM

## 2025-03-19 DIAGNOSIS — R06.83 SNORING: ICD-10-CM

## 2025-03-19 PROBLEM — J44.89 ASTHMA WITH COPD (CHRONIC OBSTRUCTIVE PULMONARY DISEASE) (HCC): Chronic | Status: ACTIVE | Noted: 2025-03-19

## 2025-03-19 PROCEDURE — 3074F SYST BP LT 130 MM HG: CPT | Performed by: INTERNAL MEDICINE

## 2025-03-19 PROCEDURE — 1160F RVW MEDS BY RX/DR IN RCRD: CPT | Performed by: INTERNAL MEDICINE

## 2025-03-19 PROCEDURE — 1159F MED LIST DOCD IN RCRD: CPT | Performed by: INTERNAL MEDICINE

## 2025-03-19 PROCEDURE — 3008F BODY MASS INDEX DOCD: CPT | Performed by: INTERNAL MEDICINE

## 2025-03-19 PROCEDURE — 99214 OFFICE O/P EST MOD 30 MIN: CPT | Performed by: INTERNAL MEDICINE

## 2025-03-19 PROCEDURE — 3078F DIAST BP <80 MM HG: CPT | Performed by: INTERNAL MEDICINE

## 2025-03-19 NOTE — PROGRESS NOTES
ANGELA Cha is a 74 year old female.    Chief complaint: follow up on chronic conditions   HPI:     ANGELA Cha is a 74 year old female who presents for follow up on chronic conditions   Iron def   Did have egd done was found to have Barrets    Last colonoscopy 2017   She is scheduled to have iron infusion         HL       Anxiety and depression                     Current Outpatient Medications   Medication Sig Dispense Refill    SERTRALINE 50 MG Oral Tab TAKE 1 TABLET BY MOUTH EVERY DAY 90 tablet 0    azelastine 137 MCG/SPRAY Nasal Solution 2 sprays by Nasal route daily. 1 each 2    ROSUVASTATIN 10 MG Oral Tab TAKE 1 TABLET BY MOUTH EVERY DAY AT NIGHT 90 tablet 1    clobetasol 0.05 % External Solution Apply 1 mL topically daily as needed. 60 mL 3    triamcinolone 0.1 % External Ointment Apply 1 Application topically 2 (two) times daily. 80 g 0    ketoconazole 2 % External Cream APPLY TO THE AFFECTED AREA(S) BY TOPICAL ROUTE ONCE DAILY TO BOTTOM OF FEET AND TOES      clotrimazole-betamethasone 1-0.05 % External Cream Apply 1 Application topically 2 (two) times daily as needed. 15 g 1    omeprazole 20 MG Oral Capsule Delayed Release Take 1 capsule (20 mg total) by mouth every morning. 30 capsule 1    levothyroxine 75 MCG Oral Tab Take 1 tablet (75 mcg total) by mouth before breakfast. 90 tablet 3    PREZCOBIX 800-150 MG Oral Tab Take 1 tablet by mouth daily.      nystatin [id number] UNIT/ML Mouth/Throat Suspension Take 1 mL (100,000 Units total) by mouth 4 (four) times daily. 60 mL 0    DESCOVY 200-25 MG Oral Tab Take 1 tablet by mouth daily.      Budesonide-Formoterol Fumarate (SYMBICORT) 80-4.5 MCG/ACT Inhalation Aerosol Inhale 2 puffs into the lungs 2 (two) times daily. 3 each 1    levocetirizine 5 MG Oral Tab Take 1 tablet (5 mg total) by mouth every evening. 90 tablet 1    clotrimazole 1 % External Cream APPLY TO RASH ON THE LOWER CHEST AND LOWER ABDOMEN ONCE DAILY INDEFINITELY GENTLY SMALL AMOUNTS       Mometasone Furoate 0.1 % External Cream 1 ring. every 28 days. FOR 21 DAYS IN, THEN REMOVE FOR 7 DAYS      Etravirine (INTELENCE) 200 MG Oral Tab Take 1 tablet by mouth 2 (two) times a day. 180 tablet 0      Past Medical History:    Allergic rhinitis    Anxiety state    occasional, no medication    Asthma (HCC)    Disorder of thyroid    hypothyroidism    History of blood transfusion    [facility]    HIV (human immunodeficiency virus infection) (HCC)    HIV (human immunodeficiency virus infection) (HCC)    Hyperlipidemia    Hypothyroidism    Internal hemorrhoids    Tinnitus    Unintentional weight loss    Visual impairment    right eye, glasses     Past Surgical History:   Procedure Laterality Date          Cataract      surgery     Colonoscopy N/A 10/20/2017    Procedure: COLONOSCOPY;  Surgeon: Rosalinda Victor MD;  Location: [facility] ENDOSCOPY    Egd  2018        Egd N/A 2024    ; Hiatal hernia,gastritis    Eye surgery Right     Tonsillectomy               Family History   Problem Relation Age of Onset    Hypertension Father     Diabetes Sister     Other (cervical cancer) Maternal Cousin Male         pre josefa     Patient Active Problem List   Diagnosis    Human immunodeficiency virus (HIV) disease (HCC)    Hypothyroidism, unspecified type    Hair loss    Snoring    Iron deficiency    Asthma with COPD (chronic obstructive pulmonary disease) ([facility])       REVIEW OF SYSTEMS:   A comprehensive 10 point review of systems was completed.  Pertinent positives and negatives noted in the the HPI            EXAM:   /76   Pulse 76   Ht 4' 10\" (1.473 m)   Wt 131 lb (59.4 kg)   LMP  (LMP Unknown)   SpO2 97%   BMI 27.38 kg/m²   GENERAL: well developed, well nourished,in no apparent distress    LUNGS: clear to auscultation  CARDIO: RRR without murmur  s              Orders Placed This Encounter    Ferritin     Standing Status:   Future     Standing Expiration  Date:   3/19/2026    Iron And Tibc     Standing Status:   Future     Standing Expiration Date:   3/19/2026    CBC With Differential With Platelet     Standing Status:   Future     Standing Expiration Date:   3/19/2026    Vitamin B12     Standing Status:   Future     Standing Expiration Date:   3/19/2026     No results found.         ASSESSMENT AND PLAN:     1. Iron deficiency  Scheduled to have iron infusion   Advised to follow up with GI to consider repeating colonoscopy and further evaluation     - Ferritin; Future  - Iron And Tibc; Future  - CBC With Differential With Platelet; Future  - Vitamin B12; Future  - Pulmonary Referral - In Network    2. Snoring  Advised to see the sleep specialist     - Ferritin; Future  - Iron And Tibc; Future  - CBC With Differential With Platelet; Future  - Vitamin B12; Future  - Pulmonary Referral - In Network    3. Other fatigue  Reviewed blood work   To see the sleep specialist   Iron and vitamin b12 next round of blood work     - Ferritin; Future  - Iron And Tibc; Future  - CBC With Differential With Platelet; Future  - Vitamin B12; Future  - Pulmonary Referral - In Network      Please return to the clinic if you are having persistent symptoms. If worsening symptoms should go to the ER    Silver Ugarte MD,   Diplomate of the American Board of Internal Medicine  Diplomate of the American Board of Obesity Medicine

## 2025-04-04 ENCOUNTER — OFFICE VISIT (OUTPATIENT)
Age: 75
End: 2025-04-04
Attending: INTERNAL MEDICINE
Payer: MEDICARE

## 2025-04-04 VITALS
BODY MASS INDEX: 28 KG/M2 | OXYGEN SATURATION: 99 % | HEART RATE: 75 BPM | RESPIRATION RATE: 16 BRPM | WEIGHT: 132 LBS | TEMPERATURE: 99 F | DIASTOLIC BLOOD PRESSURE: 79 MMHG | SYSTOLIC BLOOD PRESSURE: 134 MMHG

## 2025-04-04 DIAGNOSIS — E61.1 IRON DEFICIENCY: Primary | ICD-10-CM

## 2025-04-04 NOTE — PROGRESS NOTES
Pt here for Venofer 200mg 1 of 3 . Procedure explained to pt.  Pt verbalized understanding.  PIV placed, venofer given slow IVP via side port of a free flowing bag of 0.9NS with blood return noted throughoutl.  Pt denies any issues or concerns.      Ordering Provider: Shanel Waterman Exp: 2 more doses left     Pt tolerated infusion without difficulty or complaint. [facility]      Education Record  Learner:  Patient  Disease / Diagnosis: FERNANDO  Barriers / Limitations:  None  Method:  Discussion  General Topics:  Plan of care reviewed  Outcome:  Shows understanding

## 2025-04-11 ENCOUNTER — OFFICE VISIT (OUTPATIENT)
Age: 75
End: 2025-04-11
Attending: INTERNAL MEDICINE
Payer: MEDICARE

## 2025-04-11 DIAGNOSIS — E61.1 IRON DEFICIENCY: Primary | ICD-10-CM

## 2025-04-11 NOTE — PROGRESS NOTES
Yanna arrives to infusion clinic for 2nd iron infusion of Venofer 200. Pt denies any issues or concerns. States she felt well after her first infusion.      Ordering Provider: MD Shanel  Order Exp: 1/3 remaining      Pt tolerated infusion without difficulty or complaint. No s/s of reaction. Reviewed next apt date/time: 4/18      Education Record  Learner:  Patient  Disease / Diagnosis: Iron deficiency   Barriers / Limitations:  None  Method:  Reinforcement  General Topics:  Medication, Side effects and symptom management, and Plan of care reviewed  Outcome:  Shows understanding  No additional questions at this time.

## 2025-04-17 NOTE — PROGRESS NOTES
Pt here for iron. Denies any issues or concerns. Tolerated without difficulty or complaint. Reviewed next appt date/time. Discharge home Ambulating independently     Ordering MD:  Shanel Waterman Exp: last dose    Education Record  Learner:  Patient  Barriers / Limitations:  None  Method:  Reinforcement  General Topics:  Plan of care reviewed  Outcome:  Shows understanding

## 2025-04-18 ENCOUNTER — OFFICE VISIT (OUTPATIENT)
Age: 75
End: 2025-04-18
Attending: INTERNAL MEDICINE
Payer: MEDICARE

## 2025-04-18 VITALS
DIASTOLIC BLOOD PRESSURE: 75 MMHG | HEART RATE: 71 BPM | RESPIRATION RATE: 16 BRPM | SYSTOLIC BLOOD PRESSURE: 132 MMHG | TEMPERATURE: 98 F | OXYGEN SATURATION: 96 %

## 2025-04-18 DIAGNOSIS — E61.1 IRON DEFICIENCY: Primary | ICD-10-CM

## 2025-05-05 RX ORDER — BUDESONIDE AND FORMOTEROL FUMARATE DIHYDRATE 80; 4.5 UG/1; UG/1
2 AEROSOL RESPIRATORY (INHALATION) 2 TIMES DAILY
Qty: 1 EACH | Refills: 0 | Status: SHIPPED | OUTPATIENT
Start: 2025-05-05

## 2025-05-05 NOTE — TELEPHONE ENCOUNTER
Refill requested for   Requested Prescriptions   Pending Prescriptions Disp Refills    Budesonide-Formoterol Fumarate (SYMBICORT) 80-4.5 MCG/ACT Inhalation Aerosol 3 each 1     Sig: Inhale 2 puffs into the lungs 2 (two) times daily.       Corticosteroids / Long-Acting Bronchodilators Passed - 5/5/2025  7:51 AM        Passed - Appt in past 6 mos or next 3 mos     Recent Outpatient Visits              2 weeks ago Iron deficiency    [facility]    Office Visit    3 weeks ago Iron deficiency    [facility]    Office Visit    1 month ago Iron deficiency    [facility]    Office Visit    1 month ago Iron deficiency    [facility] Silver Ugarte MD    Office Visit    5 months ago Eczema, unspecified type    [facility] Roberto Harrison PA-C    Office Visit          Future Appointments         Provider Department Appt Notes    In 1 week [facility] RM2 [facility] Mammography [facility]     In 2 weeks Benoit Young MD [facility] Allergies    In 3 weeks [facility] DEXA RM1 [facility] DEXA [facility] Na    In 3 months Silver Ugarte MD [facility]     In 3 months Antonio Espino MD [facility] esophagus issues and bloating (policy informed)                    Passed - Medication is active on med list              Last office visit: 12/2/23    Previously advised to follow up in No follow-up timeline advised in last office visit. Diagnosis of asthma advised to follow-up in 6 months    F/U currently scheduled? 5/21/25    Date of last refill: 12/02/23    ACTION: Routed to Dr. Young   Please advise, patient  last seen in 2023 but has follow-up scheduled for 5/21/25

## 2025-05-06 RX ORDER — AZELASTINE HYDROCHLORIDE 137 UG/1
2 SPRAY, METERED NASAL DAILY
Qty: 30 ML | Refills: 0 | Status: SHIPPED | OUTPATIENT
Start: 2025-05-06

## 2025-05-06 NOTE — TELEPHONE ENCOUNTER
Patient last seen in Allergy 12/2/2023 for . . .    Moderate persistent extrinsic asthma without complication Yes     Allergic rhinitis due to animal dander      Allergic rhinitis due to dust mite      Flu vaccine need      COVID-19 vaccine series completed      Human immunodeficiency virus (HIV) disease ([facility])      Requested Prescriptions   Pending Prescriptions Disp Refills    AZELASTINE 137 MCG/SPRAY Nasal Solution [Pharmacy Med Name: AZELASTINE 0.1% (137 MCG) SPRY]  0     Sig: SPRAY 2 SPRAYS BY NASAL ROUTE DAILY       Antihistamines Passed - 5/6/2025  8:01 AM        Passed - Appt in past 12 mos or next 1 mos     Recent Outpatient Visits              2 weeks ago Iron deficiency    [place] [facility]    Office Visit    3 weeks ago Iron deficiency    [facility]    Office Visit    1 month ago Iron deficiency    [place] [facility]    Office Visit    1 month ago Iron deficiency    [facility] Silver Ugarte MD    Office Visit    5 months ago Eczema, unspecified type    [facility] Roberto Harrison PA-C    Office Visit          Future Appointments         Provider Department Appt Notes    In 1 week [facility] 2 [facility]     In 2 weeks Benoit Young MD [facility] Allergies    In 3 weeks [facility] DEXA [address] DEXA [facility] Na    In 3 months Silver Ugarte MD [facility]     In 3 months Antonio Espino MD [facility] esophagus issues and bloating (policy informed)                    Passed - Medication is active on med list           azelastine 137 MCG/SPRAY Nasal Solution 30  mL 0 5/6/2025 --    Sig - Route: SPRAY 2 SPRAYS BY NASAL ROUTE DAILY - Nasal    Sent to pharmacy as: Azelastine HCl 137 MCG/SPRAY Nasal Solution    E-Prescribing Status: Receipt confirmed by pharmacy (5/6/2025  8:02 AM CDT)      Pharmacy    [facility] #[id number] - [place], [place] - [address] AT [facility], [phone], [phone]     Prescription as above sent to pharmacy per protocol.

## 2025-05-17 ENCOUNTER — HOSPITAL ENCOUNTER (OUTPATIENT)
Dept: MAMMOGRAPHY | Facility: HOSPITAL | Age: 75
Discharge: HOME | End: 2025-05-17
Attending: INTERNAL MEDICINE
Payer: MEDICARE

## 2025-05-17 ENCOUNTER — LAB ENCOUNTER (OUTPATIENT)
Dept: LAB | Facility: HOSPITAL | Age: 75
End: 2025-05-17
Attending: INTERNAL MEDICINE
Payer: MEDICARE

## 2025-05-17 DIAGNOSIS — B20 HUMAN IMMUNODEFICIENCY VIRUS (HIV) DISEASE (HCC): ICD-10-CM

## 2025-05-17 DIAGNOSIS — R74.8 ELEVATED LIPASE: ICD-10-CM

## 2025-05-17 DIAGNOSIS — R19.7 DIARRHEA, UNSPECIFIED TYPE: ICD-10-CM

## 2025-05-17 DIAGNOSIS — Z00.00 ANNUAL PHYSICAL EXAM: ICD-10-CM

## 2025-05-17 DIAGNOSIS — R06.83 SNORING: ICD-10-CM

## 2025-05-17 DIAGNOSIS — R53.83 OTHER FATIGUE: ICD-10-CM

## 2025-05-17 DIAGNOSIS — R21 RASH: ICD-10-CM

## 2025-05-17 DIAGNOSIS — F32.1 MODERATE MAJOR DEPRESSION (HCC): ICD-10-CM

## 2025-05-17 DIAGNOSIS — Z78.0 ASYMPTOMATIC MENOPAUSE: ICD-10-CM

## 2025-05-17 DIAGNOSIS — L65.9 HAIR LOSS: ICD-10-CM

## 2025-05-17 DIAGNOSIS — Z12.31 ENCOUNTER FOR SCREENING MAMMOGRAM FOR MALIGNANT NEOPLASM OF BREAST: ICD-10-CM

## 2025-05-17 DIAGNOSIS — E03.9 HYPOTHYROIDISM, UNSPECIFIED TYPE: ICD-10-CM

## 2025-05-17 DIAGNOSIS — E61.1 IRON DEFICIENCY: ICD-10-CM

## 2025-05-17 LAB
BASOPHILS # BLD AUTO: 0.03 X10(3) UL (ref 0–0.2)
BASOPHILS NFR BLD AUTO: 0.6 %
DEPRECATED HBV CORE AB SER IA-ACNC: 131 NG/ML (ref 50–306)
DEPRECATED RDW RBC AUTO: 41.7 FL (ref 35.1–46.3)
EOSINOPHIL # BLD AUTO: 0.02 X10(3) UL (ref 0–0.7)
EOSINOPHIL NFR BLD AUTO: 0.4 %
ERYTHROCYTE [DISTWIDTH] IN BLOOD BY AUTOMATED COUNT: 13.2 % (ref 11–15)
HCT VFR BLD AUTO: 43.1 % (ref 35–48)
HGB BLD-MCNC: 13.8 G/DL (ref 12–16)
IMM GRANULOCYTES # BLD AUTO: 0.03 X10(3) UL (ref 0–1)
IMM GRANULOCYTES NFR BLD: 0.6 %
IRON SATN MFR SERPL: 21 % (ref 15–50)
IRON SERPL-MCNC: 61 UG/DL (ref 50–170)
LYMPHOCYTES # BLD AUTO: 1.33 X10(3) UL (ref 1–4)
LYMPHOCYTES NFR BLD AUTO: 25.5 %
MCH RBC QN AUTO: 27.8 PG (ref 26–34)
MCHC RBC AUTO-ENTMCNC: 32 G/DL (ref 31–37)
MCV RBC AUTO: 86.7 FL (ref 80–100)
MONOCYTES # BLD AUTO: 0.5 X10(3) UL (ref 0.1–1)
MONOCYTES NFR BLD AUTO: 9.6 %
NEUTROPHILS # BLD AUTO: 3.31 X10 (3) UL (ref 1.5–7.7)
NEUTROPHILS # BLD AUTO: 3.31 X10(3) UL (ref 1.5–7.7)
NEUTROPHILS NFR BLD AUTO: 63.3 %
PLATELET # BLD AUTO: 187 10(3)UL (ref 150–450)
RBC # BLD AUTO: 4.97 X10(6)UL (ref 3.8–5.3)
TOTAL IRON BINDING CAPACITY: 288 UG/DL (ref 250–425)
TRANSFERRIN SERPL-MCNC: 227 MG/DL (ref 250–380)
VIT B12 SERPL-MCNC: 374 PG/ML (ref 211–911)
WBC # BLD AUTO: 5.2 X10(3) UL (ref 4–11)

## 2025-05-17 PROCEDURE — 82607 VITAMIN B-12: CPT

## 2025-05-17 PROCEDURE — 36415 COLL VENOUS BLD VENIPUNCTURE: CPT

## 2025-05-17 PROCEDURE — 82728 ASSAY OF FERRITIN: CPT

## 2025-05-17 PROCEDURE — 85025 COMPLETE CBC W/AUTO DIFF WBC: CPT

## 2025-05-17 PROCEDURE — 77063 BREAST TOMOSYNTHESIS BI: CPT | Performed by: INTERNAL MEDICINE

## 2025-05-17 PROCEDURE — 84466 ASSAY OF TRANSFERRIN: CPT

## 2025-05-17 PROCEDURE — 77067 SCR MAMMO BI INCL CAD: CPT | Performed by: INTERNAL MEDICINE

## 2025-05-17 PROCEDURE — 83540 ASSAY OF IRON: CPT

## 2025-05-21 ENCOUNTER — OFFICE VISIT (OUTPATIENT)
Dept: ALLERGY | Facility: CLINIC | Age: 75
End: 2025-05-21
Payer: MEDICARE

## 2025-05-21 DIAGNOSIS — J30.2 SEASONAL AND PERENNIAL ALLERGIC RHINOCONJUNCTIVITIS: ICD-10-CM

## 2025-05-21 DIAGNOSIS — Z23 NEED FOR COVID-19 VACCINE: ICD-10-CM

## 2025-05-21 DIAGNOSIS — J45.40 MODERATE PERSISTENT EXTRINSIC ASTHMA WITHOUT COMPLICATION (HCC): Primary | ICD-10-CM

## 2025-05-21 DIAGNOSIS — Z92.29 COVID-19 VACCINE SERIES COMPLETED: ICD-10-CM

## 2025-05-21 DIAGNOSIS — H10.10 SEASONAL AND PERENNIAL ALLERGIC RHINOCONJUNCTIVITIS: ICD-10-CM

## 2025-05-21 DIAGNOSIS — Z23 NEED FOR PROPHYLACTIC VACCINATION WITH STREPTOCOCCUS PNEUMONIAE (PNEUMOCOCCUS) AND INFLUENZA VACCINES: ICD-10-CM

## 2025-05-21 DIAGNOSIS — J30.89 SEASONAL AND PERENNIAL ALLERGIC RHINOCONJUNCTIVITIS: ICD-10-CM

## 2025-05-21 DIAGNOSIS — B20 HUMAN IMMUNODEFICIENCY VIRUS (HIV) DISEASE (HCC): ICD-10-CM

## 2025-05-21 DIAGNOSIS — K21.9 GASTROESOPHAGEAL REFLUX DISEASE, UNSPECIFIED WHETHER ESOPHAGITIS PRESENT: ICD-10-CM

## 2025-05-21 DIAGNOSIS — Z23 FLU VACCINE NEED: ICD-10-CM

## 2025-05-21 PROCEDURE — 1159F MED LIST DOCD IN RCRD: CPT | Performed by: ALLERGY & IMMUNOLOGY

## 2025-05-21 PROCEDURE — 99214 OFFICE O/P EST MOD 30 MIN: CPT | Performed by: ALLERGY & IMMUNOLOGY

## 2025-05-21 PROCEDURE — 1160F RVW MEDS BY RX/DR IN RCRD: CPT | Performed by: ALLERGY & IMMUNOLOGY

## 2025-05-21 RX ORDER — ALBUTEROL SULFATE 90 UG/1
2 INHALANT RESPIRATORY (INHALATION) EVERY 4 HOURS PRN
Qty: 1 EACH | Refills: 1 | Status: SHIPPED | OUTPATIENT
Start: 2025-05-21

## 2025-05-21 NOTE — PROGRESS NOTES
The following individual(s) verbally consented to be recorded using ambient AI listening technology and understand that they can each withdraw their consent to this listening technology at any point by asking the clinician to turn off or pause the recording:    Patient name: ANGELA HeEdwina Cha  Additional names:

## 2025-05-21 NOTE — PROGRESS NOTES
ANGELA Cha is a 75 year old female.    HPI:   No chief complaint on file.    Patient is a 75-year-old female who presents for follow-up with a chief complaint of allergies and asthma  Patient last seen by me in December 2, 2023  History of allergic rhinitis and asthma.  Also with history of HIV.  Followed by infectious disease.  Prior skin testing to environmental allergens was positive to mold dust mite and dog  Medication list include Astelin Symbicort 80/4.5 Xyzal Elocon triamcinolone    Immunizations reviewed    Today patient reports  History of Present Illness  Yanna Cha is a 75 year old female with asthma and Leger's esophagus who presents with severe allergy symptoms and medication refill needs.    She has been experiencing severe allergy symptoms, including rhinorrhea and frequent nocturnal coughing. She is currently using a nasal spray.    Her asthma symptoms have worsened, with a persistent heavy chest for the past three months. She ran out of albuterol approximately two months ago and has not had any emergency room visits or courses of prednisone for asthma since her last visit. She recently obtained Symbicort and has been using it regularly, taking two puffs in the morning and two in the evening.    She has Leger's esophagus, which contributes to her nighttime coughing. She is currently taking omeprazole for reflux and notes that consuming Lindt chocolate bars exacerbates her symptoms.    She is currently seeing an infectious disease doctor and reports doing well with higher numbers than ever. No recurrent infections, thrush, or fungal infections. She is not on any antibiotic prophylaxis.        HISTORY:  Past Medical History[1]   Past Surgical History[2]   Family History[3]   Social History: Short Social Hx on File[4]     Medications (Active prior to today's visit):  Current Medications[5]    Allergies:  Allergies[6]      ROS:   Allergic/Immuno:  See hpi  Cardiovascular:  Negative for  irregular heartbeat/palpitations, chest pain, edema  Constitutional:  Negative night sweats,weight loss, irritability and lethargy  ENMT:  Negative for ear drainage, hearing loss and nasal drainage  Eyes:  Negative for eye discharge and vision loss  Gastrointestinal:  Negative for abdominal pain, diarrhea and vomiting  Integumentary:  Negative for pruritus and rash  Respiratory:  Negative for cough, dyspnea and wheezing    PHYSICAL EXAM:   Constitutional: responsive, no acute distress noted  Head/Face: NC/Atraumatic  Eyes/Vision: conjunctiva and lids are normal extraocular motion is intact   Ears/Audiometry: tympanic membranes are normal bilaterally hearing is grossly intact  Nose/Mouth/Throat: nose and throat are clear mucous membranes are moist   Neck/Thyroid: neck is supple without adenopathy  Lymphatic: no abnormal cervical, supraclavicular or axillary adenopathy is noted  Respiratory: normal to inspection lungs are clear to auscultation bilaterally normal respiratory effort   Cardiovascular: regular rate and rhythm no murmurs, gallups, or rubs  Abdomen: soft non-tender non-distended  Skin/Hair: no unusual rashes present   Extremities: no edema, cyanosis, or clubbing     ASSESSMENT/PLAN:   Assessment   Encounter Diagnoses   Name Primary?    Moderate persistent extrinsic asthma without complication (HCC) Yes    Seasonal and perennial allergic rhinoconjunctivitis     Flu vaccine need     COVID-19 vaccine series completed     Human immunodeficiency virus (HIV) disease (HCC)     Need for COVID-19 vaccine     Need for prophylactic vaccination with Streptococcus pneumoniae (Pneumococcus) and Influenza vaccines        Assessment & Plan       Assessment & Plan  Asthma  Asthma exacerbation with symptoms of heavy chest persisting for three months. She has been out of albuterol for two months and is currently using Symbicort daily. No recent emergency room visits or courses of prednisone for exacerbations.  - Refill  albuterol prescription and send to [facility] pharmacy.  - Continue current dose of Symbicort, with option to increase if symptoms worsen.    Leger's esophagus  Leger's esophagus with associated reflux symptoms, including nighttime coughing. She is currently on omeprazole for management. Advised to avoid eating close to bedtime and to limit intake of caffeine, chocolate, acidic foods, and spicy foods.  - Continue omeprazole for reflux management.  - Advise to avoid eating at least two hours before bedtime.  - Advise to limit intake of caffeine, chocolate, acidic foods, and spicy foods.    Allergic rhinitis  Continue with Xyzal 5 g once a day.  Reviewed avoidance measures and potential treatment option immunotherapy    Flu vaccine in the fall recommended high dose  COVID-vaccine booster in the fall recommended  Pneumonia vaccine with Prevnar 20 recommended         Orders This Visit:  No orders of the defined types were placed in this encounter.      Meds This Visit:  Requested Prescriptions      No prescriptions requested or ordered in this encounter       Imaging & Referrals:  None     5/21/2025  Benoit Young MD    If medication samples were provided today, they were provided solely for patient education and training related to self administration of these medications.  Teaching, instruction and sample was provided to the patient by myself.  Teaching included  a review of potential adverse side effects as well as potential efficacy.  Patient's questions were answered in regards to medication administration and dosing and potential side effects. Teaching was provided via the teach back method           [1]   Past Medical History:   Allergic rhinitis    Anxiety state    occasional, no medication    Asthma ([facility])    Disorder of thyroid    hypothyroidism    [place] of blood [place]    HIV (human immunodeficiency virus infection) ([facility])    HIV (human immunodeficiency virus infection) ([facility])     Hyperlipidemia    Hypothyroidism    Internal hemorrhoids    Tinnitus    Unintentional weight loss    Visual impairment    right eye, glasses   [2]   Past Surgical History:  Procedure Laterality Date          Cataract      surgery     Colonoscopy N/A 10/20/2017    Procedure: COLONOSCOPY;  Surgeon: Rosalinda Victor MD;  Location: [facility] ENDOSCOPY    Egd  2018        Egd N/A 2024    ; Hiatal hernia,gastritis    Eye surgery Right 2003    Tonsillectomy     [3]   Family History  Problem Relation Age of Onset    Hypertension Father     Diabetes Sister     Other (cervical cancer) Maternal Cousin Male         pre josefa    Breast Cancer Neg    [4]   Social History  Socioeconomic History    Marital status: Single   Tobacco Use    Smoking status: Never    Smokeless tobacco: Never    Tobacco comments:     Pt denies passive smoke exposure in her home.    Vaping Use    Vaping status: Never Used   Substance and Sexual Activity    Alcohol use: Not Currently     Comment: On occasion    Drug use: Never   Other Topics Concern    Reaction to local anesthetic No   [5]   Current Outpatient Medications   Medication Sig Dispense Refill    azelastine 137 MCG/SPRAY Nasal Solution SPRAY 2 SPRAYS BY NASAL ROUTE DAILY 30 mL 0    Budesonide-Formoterol Fumarate (SYMBICORT) 80-4.5 MCG/ACT Inhalation Aerosol Inhale 2 puffs into the lungs 2 (two) times daily. 1 each 0    SERTRALINE 50 MG Oral Tab TAKE 1 TABLET BY MOUTH EVERY DAY 90 tablet 0    ROSUVASTATIN 10 MG Oral Tab TAKE 1 TABLET BY MOUTH EVERY DAY AT NIGHT 90 tablet 1    clobetasol 0.05 % External Solution Apply 1 mL topically daily as needed. 60 mL 3    triamcinolone 0.1 % External Ointment Apply 1 Application topically 2 (two) times daily. 80 g 0    ketoconazole 2 % External Cream APPLY TO THE AFFECTED AREA(S) BY TOPICAL ROUTE ONCE DAILY TO BOTTOM OF FEET AND TOES      clotrimazole-betamethasone 1-0.05 % External Cream Apply 1 Application topically  2 (two) times daily as needed. 15 g 1    omeprazole 20 MG Oral Capsule Delayed Release Take 1 capsule (20 mg total) by mouth every morning. 30 capsule 1    levothyroxine 75 MCG Oral Tab Take 1 tablet (75 mcg total) by mouth before breakfast. 90 tablet 3    PREZCOBIX 800-150 MG Oral Tab Take 1 tablet by mouth daily.      nystatin [id number] UNIT/ML Mouth/Throat Suspension Take 1 mL (100,000 Units total) by mouth 4 (four) times daily. 60 mL 0    DESCOVY 200-25 MG Oral Tab Take 1 tablet by mouth daily.      levocetirizine 5 MG Oral Tab Take 1 tablet (5 mg total) by mouth every evening. 90 tablet 1    clotrimazole 1 % External Cream APPLY TO RASH ON THE LOWER CHEST AND LOWER ABDOMEN ONCE DAILY INDEFINITELY GENTLY SMALL AMOUNTS      Mometasone Furoate 0.1 % External Cream 1 ring. every 28 days. FOR 21 DAYS IN, THEN REMOVE FOR 7 DAYS      Etravirine (INTELENCE) 200 MG Oral Tab Take 1 tablet by mouth 2 (two) times a day. 180 tablet 0   [6]   Allergies  Allergen Reactions    Dust Mite Extract ITCHING

## 2025-07-13 ENCOUNTER — PATIENT MESSAGE (OUTPATIENT)
Dept: INTERNAL MEDICINE CLINIC | Facility: CLINIC | Age: 75
End: 2025-07-13

## 2025-07-13 DIAGNOSIS — E03.9 HYPOTHYROIDISM, UNSPECIFIED TYPE: ICD-10-CM

## 2025-07-14 RX ORDER — LEVOTHYROXINE SODIUM 75 UG/1
75 TABLET ORAL
Qty: 90 TABLET | Refills: 0 | Status: SHIPPED | OUTPATIENT
Start: 2025-07-14

## 2025-07-14 NOTE — TELEPHONE ENCOUNTER
Future Appt. 08/25/2025    Last Visit: 03/19/2025    Medication Requested:  Requested Prescriptions     Pending Prescriptions Disp Refills    levothyroxine 75 MCG Oral Tab 90 tablet 3     Sig: Take 1 tablet (75 mcg total) by mouth before breakfast.        Last refill:        Disp Refills Start End     levothyroxine 75 MCG Oral Tab 90 tablet 3 7/12/2024 --    Sig - Route: Take 1 tablet (75 mcg total) by mouth before breakfast. - Oral        Thyroid Medication Protocol [id number]/14/2025 01:49 PM   Protocol Details TSH in past 12 months    Last TSH value is normal    In person appointment or virtual visit in the past 12 mos or appointment in next 3 mos    Medication is active on med list

## 2025-08-03 DIAGNOSIS — Z12.31 ENCOUNTER FOR SCREENING MAMMOGRAM FOR MALIGNANT NEOPLASM OF BREAST: ICD-10-CM

## 2025-08-03 DIAGNOSIS — R74.8 ELEVATED LIPASE: ICD-10-CM

## 2025-08-03 DIAGNOSIS — Z78.0 ASYMPTOMATIC MENOPAUSE: ICD-10-CM

## 2025-08-03 DIAGNOSIS — R21 RASH: ICD-10-CM

## 2025-08-03 DIAGNOSIS — B20 HUMAN IMMUNODEFICIENCY VIRUS (HIV) DISEASE (HCC): ICD-10-CM

## 2025-08-03 DIAGNOSIS — L65.9 HAIR LOSS: ICD-10-CM

## 2025-08-03 DIAGNOSIS — R06.83 SNORING: ICD-10-CM

## 2025-08-03 DIAGNOSIS — E03.9 HYPOTHYROIDISM, UNSPECIFIED TYPE: ICD-10-CM

## 2025-08-03 DIAGNOSIS — Z00.00 ANNUAL PHYSICAL EXAM: ICD-10-CM

## 2025-08-03 DIAGNOSIS — R19.7 DIARRHEA, UNSPECIFIED TYPE: ICD-10-CM

## 2025-08-03 DIAGNOSIS — F32.1 MODERATE MAJOR DEPRESSION (HCC): ICD-10-CM

## 2025-08-05 RX ORDER — ROSUVASTATIN CALCIUM 10 MG/1
10 TABLET, COATED ORAL NIGHTLY
Qty: 90 TABLET | Refills: 3 | Status: SHIPPED | OUTPATIENT
Start: 2025-08-05

## 2025-08-06 ENCOUNTER — OFFICE VISIT (OUTPATIENT)
Facility: CLINIC | Age: 75
End: 2025-08-06

## 2025-08-06 ENCOUNTER — LAB ENCOUNTER (OUTPATIENT)
Dept: LAB | Facility: HOSPITAL | Age: 75
End: 2025-08-06
Attending: INTERNAL MEDICINE

## 2025-08-06 VITALS
HEART RATE: 64 BPM | HEIGHT: 58 IN | BODY MASS INDEX: 28 KG/M2 | SYSTOLIC BLOOD PRESSURE: 154 MMHG | DIASTOLIC BLOOD PRESSURE: 87 MMHG

## 2025-08-06 DIAGNOSIS — R10.11 RIGHT UPPER QUADRANT ABDOMINAL PAIN: ICD-10-CM

## 2025-08-06 DIAGNOSIS — R19.8 IRREGULAR BOWEL HABITS: ICD-10-CM

## 2025-08-06 DIAGNOSIS — R13.19 ESOPHAGEAL DYSPHAGIA: Primary | ICD-10-CM

## 2025-08-06 LAB — IGA SERPL-MCNC: 199.5 MG/DL (ref 40–350)

## 2025-08-06 PROCEDURE — 86364 TISS TRNSGLTMNASE EA IG CLAS: CPT

## 2025-08-06 PROCEDURE — 1160F RVW MEDS BY RX/DR IN RCRD: CPT | Performed by: INTERNAL MEDICINE

## 2025-08-06 PROCEDURE — 3079F DIAST BP 80-89 MM HG: CPT | Performed by: INTERNAL MEDICINE

## 2025-08-06 PROCEDURE — 82784 ASSAY IGA/IGD/IGG/IGM EACH: CPT

## 2025-08-06 PROCEDURE — 36415 COLL VENOUS BLD VENIPUNCTURE: CPT

## 2025-08-06 PROCEDURE — 1125F AMNT PAIN NOTED PAIN PRSNT: CPT | Performed by: INTERNAL MEDICINE

## 2025-08-06 PROCEDURE — 3008F BODY MASS INDEX DOCD: CPT | Performed by: INTERNAL MEDICINE

## 2025-08-06 PROCEDURE — 1159F MED LIST DOCD IN RCRD: CPT | Performed by: INTERNAL MEDICINE

## 2025-08-06 PROCEDURE — 99214 OFFICE O/P EST MOD 30 MIN: CPT | Performed by: INTERNAL MEDICINE

## 2025-08-06 PROCEDURE — 3077F SYST BP >= 140 MM HG: CPT | Performed by: INTERNAL MEDICINE

## 2025-08-06 RX ORDER — DICYCLOMINE HYDROCHLORIDE 10 MG/1
10 CAPSULE ORAL 3 TIMES DAILY PRN
Qty: 90 CAPSULE | Refills: 0 | Status: SHIPPED | OUTPATIENT
Start: 2025-08-06

## 2025-08-07 LAB — TTG IGA SER-ACNC: 0.3 U/ML (ref ?–7)

## 2025-08-15 ENCOUNTER — TELEPHONE (OUTPATIENT)
Facility: CLINIC | Age: 75
End: 2025-08-15

## 2025-08-20 ENCOUNTER — TELEPHONE (OUTPATIENT)
Facility: CLINIC | Age: 75
End: 2025-08-20

## 2025-08-20 ENCOUNTER — HOSPITAL ENCOUNTER (OUTPATIENT)
Dept: GENERAL RADIOLOGY | Facility: HOSPITAL | Age: 75
Discharge: HOME | End: 2025-08-20
Attending: INTERNAL MEDICINE

## 2025-08-20 DIAGNOSIS — R10.11 RIGHT UPPER QUADRANT ABDOMINAL PAIN: ICD-10-CM

## 2025-08-20 DIAGNOSIS — R19.8 IRREGULAR BOWEL HABITS: ICD-10-CM

## 2025-08-20 DIAGNOSIS — R13.10 DYSPHAGIA, UNSPECIFIED TYPE: Primary | ICD-10-CM

## 2025-08-20 DIAGNOSIS — R13.19 ESOPHAGEAL DYSPHAGIA: ICD-10-CM

## 2025-08-20 PROCEDURE — 74220 X-RAY XM ESOPHAGUS 1CNTRST: CPT | Performed by: INTERNAL MEDICINE

## (undated) DIAGNOSIS — R13.10 DYSPHAGIA, UNSPECIFIED TYPE: Primary | ICD-10-CM

## (undated) DIAGNOSIS — K21.9 GASTROESOPHAGEAL REFLUX DISEASE, UNSPECIFIED WHETHER ESOPHAGITIS PRESENT: ICD-10-CM

## (undated) DIAGNOSIS — E03.9 HYPOTHYROIDISM, UNSPECIFIED TYPE: ICD-10-CM

## (undated) DEVICE — SOL  .9 1000ML BTL

## (undated) DEVICE — Device: Brand: DEFENDO AIR/WATER/SUCTION AND BIOPSY VALVE

## (undated) DEVICE — ENDOSCOPY PACK - LOWER: Brand: MEDLINE INDUSTRIES, INC.

## (undated) DEVICE — CO2 CANNULA,SSOFT,ADLT,7O2,4CO2,FEMALE: Brand: MEDLINE

## (undated) DEVICE — 3M™ MICROFOAM™ SURGICAL TAPE, 2 INCHES X 5 YARDS, 6 ROLLS/CARTON, 6 CARTONS/CASE, 1528-2: Brand: 3M™ MICROFOAM™

## (undated) DEVICE — APPLICATOR COTTON TIP 3 10/PK

## (undated) DEVICE — GIJAW SINGLE-USE BIOPSY FORCEPS WITH NEEDLE: Brand: GIJAW

## (undated) DEVICE — 12 ML SYRINGE LUER-LOCK TIP: Brand: MONOJECT

## (undated) DEVICE — DISP ATKINSON RETROBULBAR NDL 25G 10/BOX: Brand: DISP ATKINSON RETROBULBAR NDL 25G 10/BOX

## (undated) DEVICE — CONMED SCOPE SAVER BITE BLOCK, 20X27 MM: Brand: SCOPE SAVER

## (undated) DEVICE — STANDARD HYPODERMIC NEEDLE,POLYPROPYLENE HUB: Brand: MONOJECT

## (undated) DEVICE — SUTURE PLAIN GUT 6-0 TG140-8

## (undated) DEVICE — STERILE LATEX POWDER-FREE SURGICAL GLOVESWITH NITRILE COATING: Brand: PROTEXIS

## (undated) DEVICE — KIT CLEAN ENDOKIT 1.1OZ GOWNX2

## (undated) DEVICE — SYRINGE/GUAGE ASSEMBLY

## (undated) DEVICE — KIT ENDO ORCAPOD 160/180/190

## (undated) DEVICE — SUTURE SILK 4-0 S2782K

## (undated) DEVICE — YANKAUER,BULB TIP,W/O VENT,RIGID,STERILE: Brand: MEDLINE

## (undated) DEVICE — SYRINGE, LUER SLIP, STERILE, 60ML: Brand: MEDLINE

## (undated) DEVICE — FORCEP RADIAL JAW 4

## (undated) DEVICE — RETINAL: Brand: MEDLINE INDUSTRIES, INC.

## (undated) DEVICE — COVER SGL STRL LGHT HNDL BLU

## (undated) DEVICE — LINE MNTR ADLT SET O2 INTMD

## (undated) DEVICE — MEDI-VAC NON-CONDUCTIVE SUCTION TUBING: Brand: CARDINAL HEALTH

## (undated) DEVICE — 35 ML SYRINGE REGULAR TIP: Brand: MONOJECT

## (undated) DEVICE — MEDI-VAC NON-CONDUCTIVE SUCTION TUBING 6MM X 1.8M (6FT.) L: Brand: CARDINAL HEALTH

## (undated) DEVICE — SOLUTION IRR BTL 250CC NACL

## (undated) DEVICE — ENDOSCOPY PACK UPPER: Brand: MEDLINE INDUSTRIES, INC.

## (undated) DEVICE — SUTURE SILK 0

## (undated) DEVICE — CATH BALLOON CRE 15-18MM 5837

## (undated) NOTE — LETTER
[address], [place]  Authorization for Invasive Procedures  1. I hereby authorize Dr. Amy Chu , my physician and whomever may be designated as the doctor's assistant, to perform the following operation and/or procedure: ESOPHAGOGASTRODUODENOSCOPY with Possible Dilation   on ANGELA Arguelles at [facility].    2. My physician has explained to me the nature and purpose of the operation or other procedure, possible alternative methods of treatment, the risks involved and the possibility of complications to me. I understand the probable consequences of declining the recommended procedure and the alternative methods of treatment. I acknowledge that no guarantee has been made as to the result that may be obtained. 3. I recognize that during the course of this operation or other procedure, unforeseen conditions may necessitate additional or different procedures than those listed above. I, therefore, further authorize and request that the above-named physician, his/her physician assistants, or designees perform such procedures as are, in his/her professional opinion, necessary and desirable. If I have a Do Not Attempt Resuscitation (DNAR) order in place, that status will be suspended while in the operating room, procedural suite, and during the recovery period unless otherwise explicitly stated by me (or a person authorized to consent on my behalf). The surgeon or my attending physician will determine when the applicable recovery period ends for purposes of reinstating the DNAR order. 4. Should the need arise during my operation or immediate post-operative period; I also consent to the administration of blood and/or blood products.  Further, I understand that despite careful testing and screening of blood and blood products, I may still be subject to ill effects as a result of recieving a blood transfusion an/or blood producst. The following are some, but not all, of the potential risks that can occur: fever and allergic reactions, hemolytic reactions, transmission of disease such as hepatitis, AIDS, cytomegalovirus (CMV), and flluid overload. In the event that I wish to have autologous transfusions of my own blood, or a directed donor transfusion, I will discuss this with my physician. 5. I consent to the photographing of the operations or procedures to be performed for the purposes of advancing medicine, science, and/or education, provided my identity is not revealed. If the procedure has been videotaped, the physician/surgeon will obtain the original videotape. [facility] will not be responsible for storage or maintenance of this tape. 6. I consent to the presence of a  or observer as deemed necessary by my physician or his designee. 7. Any tissues or organs removed in the operation or other procedure may be disposed of by and at the discretion of [facility].    8. I understand that the physician and his/her physician assistants may not be employees or agents of [facility], [facility], nor [facility], but are independent medical practitioners who have been permitted to use its facilities for the care and treatment of their patients. 9. Patients having a sterilization procedure: I understand that if the procedure is successful the results will be permanent and it will therefore be impossible for me to inseminate, conceive or bear children. I also understand that the procedure is intended to result in sterility, although the result has not been guaranteed. 10. I CERTIFY THAT I HAVE READ AND FULLY UNDERSTAND THE ABOVE CONSENT TO OPERATION and/or OTHER PROCEDURE. 11. I acknowledge that my physician has explained sedation/analgesia administration to me including the risks and benefits.  I consent to the administration of sedation/analgesia as may be necessary or desirable in the judgment of my physician. Signature of Patient:  ________________________________________________ Date: _________Time: _________    Responsible person in case of minor or unconscious: _____________________________Relationship: ____________     Witness Signature: ____________________________________________ Date: __________ Time: ___________    Statement of Physician  My signature below affirms that prior to the time of the procedure, I have explained to the patient and/or her legal representative, the risks and benefits involved in the proposed treatment and any reasonable alternative to the proposed treatment. I have also explained the risks and benefits involved in the refusal of the proposed treatment and have answered the patient's questions. If I have a significant financial interest in this procedure/surgery, I have disclosed this and had a discussion with my patient.     Signature of Physician:   ________________________________________Date: _________Time:_______ Patient Name: Keon Walsh  : 3/20/1950   Printed: 2022    Medical Record #: [id number]

## (undated) NOTE — LETTER
11/21/19        [address] [place] [id number]      Dear Damon Wise,    Our records indicate that you have outstanding lab work and or testing that was ordered for you and has not yet been completed:  Orders Placed This

## (undated) NOTE — LETTER
AUTHORIZATION FOR SURGICAL OPERATION OR OTHER PROCEDURE    1. I hereby authorize Dr. Lizarraga, and [facility] staff assigned to my case to perform the following operation and/or procedure at the [facility]:    Bilateral knee cortisone injection  _______________________________________________________________________________________________      _______________________________________________________________________________________________    2.  My physician has explained the nature and purpose of the operation or other procedure, possible alternative methods of treatment, the risks involved, and the possibility of complication to me.  I acknowledge that no guarantee has been made as to the result that may be obtained.  3.  I recognize that, during the course of this operation, or other procedure, unforseen conditions may necessitate additional or different procedure than those listed above.  I, therefore, further authorize and request that the above named physician, his/her physician assistants or designees perform such procedures as are, in his/her professional opinion, necessary and desirable.  4.  Any tissue or organs removed in the operation or other procedure may be disposed of by and at the discretion of the [facility] and [facility].  5.  I understand that in the event of a medical emergency, I will be transported by local paramedics to [facility] or other hospital emergency department.  6.  I certify that I have read and fully understand the above consent to operation and/or other procedure.    7.  I acknowledge that my physician has explained sedation/analgesia administration to me including the risks and benefits.  I consent to the administration of sedation/analgesia as may be necessary or desirable in the judgement of my physician.    Witness signature: ___________________________________________________ Date:  ______/______/_____                     Time:  ________ A.M.  P.M.       Patient Name:  ______________________________________________________  (please print)      Patient signature:  ___________________________________________________             Relationship to Patient:           []  Parent    Responsible person                          []  Spouse  In case of minor or                    [] Other  _____________   Incompetent name:  __________________________________________________                               (please print)      _____________      Responsible person  In case of minor or  Incompetent signature:  _______________________________________________    Statement of Physician  My signature below affirms that prior to the time of the procedure, I have explained to the patient and/or his/her guardian, the risks and benefits involved in the proposed treatment and any reasonable alternative to the proposed treatment.  I have also explained the risks and benefits involved in the refusal of the proposed treatment and have answered the patient's questions.                        Date:  ______/______/_______  Provider                      Signature:  __________________________________________________________       Time:  ___________ A.M    P.M.

## (undated) NOTE — ED AVS SNAPSHOT
Manolo Tillman   MRN: [id number]    Department:  [facility] Emergency Department   Date of Visit:  8/1/2018           Disclosure     Insurance plans vary and the physician(s) referred by the ER may not be covered by your plan.  Please contac within the next three months to obtain basic health screening including reassessment of your blood pressure.     IF THERE IS ANY CHANGE OR WORSENING OF YOUR CONDITION, CALL YOUR PRIMARY CARE PHYSICIAN AT ONCE OR RETURN IMMEDIATELY TO THE EMERGENCY DEPARTMEN

## (undated) NOTE — LETTER
10/14/19        [address] [place] [id number]      Dear Mi Dexter,    Our records indicate that you have outstanding lab work and or testing that was ordered for you and has not yet been completed:  Orders Placed This

## (undated) NOTE — LETTER
AUTHORIZATION FOR SURGICAL OPERATION OR OTHER PROCEDURE    1. I hereby authorize Irma STEWART, and [facility] staff assigned to my case to perform the following operation and/or procedure at the [facility] site:    _______________________________________________________________________________________________    Bilateral knee cortisone injection  _______________________________________________________________________________________________    2.  My physician has explained the nature and purpose of the operation or other procedure, possible alternative methods of treatment, the risks involved, and the possibility of complication to me.  I acknowledge that no guarantee has been made as to the result that may be obtained.  3.  I recognize that, during the course of this operation, or other procedure, unforseen conditions may necessitate additional or different procedure than those listed above.  I, therefore, further authorize and request that the above named physician, his/her physician assistants or designees perform such procedures as are, in his/her professional opinion, necessary and desirable.  4.  Any tissue or organs removed in the operation or other procedure may be disposed of by and at the discretion of the [facility] and [facility].  5.  I understand that in the event of a medical emergency, I will be transported by local paramedics to [facility] or other hospital emergency department.  6.  I certify that I have read and fully understand the above consent to operation and/or other procedure.    7.  I acknowledge that my physician has explained sedation/analgesia administration to me including the risks and benefits.  I consent to the administration of sedation/analgesia as may be necessary or desirable in the judgement of my physician.    Witness signature: ___________________________________________________ Date:   ______/______/_____                    Time:  ________ A.M.  P.M.       Patient Name:  ______________________________________________________  (please print)      Patient signature:  ___________________________________________________             Relationship to Patient:           []  Parent    Responsible person                          []  Spouse  In case of minor or                    [] Other  _____________   Incompetent name:  __________________________________________________                               (please print)      _____________      Responsible person  In case of minor or  Incompetent signature:  _______________________________________________    Statement of Physician  My signature below affirms that prior to the time of the procedure, I have explained to the patient and/or his/her guardian, the risks and benefits involved in the proposed treatment and any reasonable alternative to the proposed treatment.  I have also explained the risks and benefits involved in the refusal of the proposed treatment and have answered the patient's questions.                        Date:  ______/______/_______  Provider                      Signature:  __________________________________________________________       Time:  ___________ A.M    P.M.

## (undated) NOTE — LETTER
02/13/21        [address] [place] [phone]      Dear Maribel Smith,    Our records indicate that you have outstanding lab work and or testing that was ordered for you and has not yet been completed:  Orders Placed This

## (undated) NOTE — LETTER
02/21/18        [address] [place] [phone]      Dear Love Sexton,    Our records indicate that you have outstanding lab work and or testing that was ordered for you and has not yet been completed:          [employer]

## (undated) NOTE — LETTER
7/5/2024              ANGELA Cha        [address]        [place] [facility] [phone]         Dear ANGELA Bland,    Our records indicate that the tests ordered for you by Antonio Espino MD  have not been done.  If you have, in fact, already completed the tests or you do not wish to have the tests done, please contact our office at THE NUMBER LISTED BELOW.  Otherwise, please proceed with the testing.  Enclosed is a duplicate order for your convenience.     Labs Order:    Pancreatic Elastase, Fecal (Order #[id number]) on 6/5/24         Sincerely,    Antonio Espino MD  [facility]  [address]  [place] [address] [phone]

## (undated) NOTE — LETTER
[facility]  [address], [place], [place]    Authorization for Surgical Operation and Procedure                               I hereby authorize Antonio Espino MD, my physician and his/her assistants (if applicable), which may include medical students, residents, and/or fellows, to perform the following surgical operation/ procedure and administer such anesthesia as may be determined necessary by my physician: Operation/Procedure name (s) ESOPHAGOGASTRODUODENOSCOPY on ANGELA Cha   2.   I recognize that during the surgical operation/procedure, unforeseen conditions may necessitate additional or different procedures than those listed above.  I, therefore, further authorize and request that the above-named surgeon, assistants, or designees perform such procedures as are, in their judgment, necessary and desirable.    3.   My surgeon/physician has discussed prior to my surgery the potential benefits, risks and side effects of this procedure; the likelihood of achieving goals; and potential problems that might occur during recuperation.  They also discussed reasonable alternatives to the procedure, including risks, benefits, and side effects related to the alternatives and risks related to not receiving this procedure.  I have had all my questions answered and I acknowledge that no guarantee has been made as to the result that may be obtained.    4.   Should the need arise during my operation/procedure, which includes change of level of care prior to discharge, I also consent to the administration of blood and/or blood products.  Further, I understand that despite careful testing and screening of blood or blood products by collecting agencies, I may still be subject to ill effects as a result of receiving a blood transfusion and/or blood products.  The following are some, but not all, of the potential risks that can occur: fever and allergic reactions, hemolytic reactions, transmission of  diseases such as Hepatitis, AIDS and Cytomegalovirus (CMV) and fluid overload.  In the event that I wish to have an autologous transfusion of my own blood, or a directed donor transfusion, I will discuss this with my physician.  Check only if Refusing Blood or Blood Products  I understand refusal of blood or blood products as deemed necessary by my physician may have serious consequences to my condition to include possible death. I hereby assume responsibility for my refusal and release the hospital, its personnel, and my physicians from any responsibility for the consequences of my refusal.    o  Refuse   5.   I authorize the use of any specimen, organs, tissues, body parts or foreign objects that may be removed from my body during the operation/procedure for diagnosis, research or teaching purposes and their subsequent disposal by hospital authorities.  I also authorize the release of specimen test results and/or written reports to my treating physician on the hospital medical staff or other referring or consulting physicians involved in my care, at the discretion of the Pathologist or my treating physician.    6.   I consent to the photographing or videotaping of the operations or procedures to be performed, including appropriate portions of my body for medical, scientific, or educational purposes, provided my identity is not revealed by the pictures or by descriptive texts accompanying them.  If the procedure has been photographed/videotaped, the surgeon will obtain the original picture, image, videotape or CD.  The hospital will not be responsible for storage, release or maintenance of the picture, image, tape or CD.    7.   I consent to the presence of a  or observers in the operating room as deemed necessary by my physician or their designees.    8.   I recognize that in the event my procedure results in extended X-Ray/fluoroscopy time, I may develop a skin reaction.    9. If I have a Do Not  Attempt Resuscitation (DNAR) order in place, that status will be suspended while in the operating room, procedural suite, and during the recovery period unless otherwise explicitly stated by me (or a person authorized to consent on my behalf). The surgeon or my attending physician will determine when the applicable recovery period ends for purposes of reinstating the DNAR order.  10. Patients having a sterilization procedure: I understand that if the procedure is successful the results will be permanent and it will therefore be impossible for me to inseminate, conceive, or bear children.  I also understand that the procedure is intended to result in sterility, although the result has not been guaranteed.   11. I acknowledge that my physician has explained sedation/analgesia administration to me including the risk and benefits I consent to the administration of sedation/analgesia as may be necessary or desirable in the judgment of my physician.    I CERTIFY THAT I HAVE READ AND FULLY UNDERSTAND THE ABOVE CONSENT TO OPERATION and/or OTHER PROCEDURE.     ____________________________________  _________________________________        ______________________________  Signature of Patient    Signature of Responsible Person                Printed Name of Responsible Person                                      ____________________________________  _____________________________                ________________________________  Signature of Witness        Date  Time         Relationship to Patient    STATEMENT OF PHYSICIAN My signature below affirms that prior to the time of the procedure; I have explained to the patient and/or his/her legal representative, the risks and benefits involved in the proposed treatment and any reasonable alternative to the proposed treatment. I have also explained the risks and benefits involved in refusal of the proposed treatment and alternatives to the proposed treatment and have answered the  patient's questions. If I have a significant financial interest in a co-management agreement or a significant financial interest in any product or implant, or other significant relationship used in this procedure/surgery, I have disclosed this and had a discussion with my patient.     _____________________________________________________              _____________________________  (Signature of Physician)                                                                                         (Date)                                   (Time)  Patient Name: ANGELA Cha      : 3/20/1950      Printed: 2024     Medical Record #: [id number]                                      Page 1 of 1

## (undated) NOTE — LETTER
Hospital Discharge Documentation  Please phone to schedule a hospital follow up appointment. No discharge summary available at this time, below is the most recent progress note  for your review .       From: [id number]  Phone: [phone] Progress Notes   Signed   Date of Service:  9/15/2020  5:08 PM               Signed             Show:Clear all  [x]Manual[x]Template[x]Copied    Added by:  [x]Dickson Rosenthal DO    []Shirin for details  [facility]     Progress Note    morphINE sulfate (PF) 4 MG/ML injection 4 mg, 4 mg, Intravenous, Q2H PRN  hydrALAzine HCl (APRESOLINE) injection 10 mg, 10 mg, Intravenous, Q4H PRN  Pantoprazole Sodium (PROTONIX) EC tab 40 mg, 40 mg, Oral, QAM AC                 Lab Results   Component Va periventricular white matter bilaterally. Stable unchanged high-density material within the right globe may be a prosthesis. 3. Results were called to the emergency room by Dr. Marzena Thomas and discussed with Dr. Wanda Arteaga at approximately 1810 hours.     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 Dominant left vertebral artery. Normal basilar artery. No evidence of large aneurysm, significant stenosis or vascular occlusion.      Dictated by (CST): Jason Lopez MD on 9/13/2020 at 8:13 Primary care physician  Malka Velazquez MD     Disposition  Clinical course will dictate outcome                 Nicholas Sandhoff, DO  >35 min spent with patient.  > 50% time was spent counseling patient, discussing plan of care, discussing labs and imaging f

## (undated) NOTE — Clinical Note
FYI, TCM call made, see notes. GRANT attempted to schedule a TCM HFU due to MD's limited schedule NCM sent message to MD's office to request assistance schedule TCM HFU.

## (undated) NOTE — LETTER
[facility]  Administration of Anesthesia  1. Becki Riso, or _________________________________ acting on her behalf, (Patient) (Dependent/Representative) request to receive anesthesia for my pending procedure/operation/treatment. A physician (anesthesiologist) alone or an anesthesiologist working with a nurse anesthetist may administer my anesthesia. 2. I understand that my anesthesiologist is not an employee or agent of the hospital, but is an independent medical practitioner who has been permitted to use its facilities for the care and treatment of his/her patients. 3. I acknowledge that a physician from [facility] or their designate(s), recommended anesthesia for me using her/his medical judgment. The type(s) of anesthesia I may receive include:                a) General Anesthesia, b) Spinal/Epidural Anesthesia, c) Regional Anesthesia or d) Monitored Anesthesia Care. 4. If my spinal, regional or monitored anesthesia care (local) is not satisfactory for my comfort, or if my medical condition requires, I consent to the administration of general anesthesia. 5. I am aware that the practice of anesthesiology is not an exact science and that some foreseeable risks or consequences may occur. Some common risks/consequences include sore throat and hoarseness, nausea and vomiting, muscle soreness, backache, damage to the mouth/teeth/vocal cords and eye injury. I understand that more rare but serious potential risks of anesthesia include blood pressure changes, drug reactions, cardiac arrest, brain damage, paralysis or death. These risks apply to whether I have general, spinal/epidural, regional or monitored anesthesia care. 6. OBSTETRIC PATIENTS: Specific risks/consequences of spinal/epidural anesthesia may include itching, low blood pressure, difficulty urinating, slowing of the baby's heart rate and headache.  Rare risks include infections, high spinal block, spinal bleeding, seizure, cardiac arrest and death. 7. AWARENESS: I understand that it is possible (but unlikely) to have explicit memory of events from the operating room while under general anesthesia. 8. ELECTROCONVULSIVE THERAPY PATIENTS: This consent serves for all treatments in a single course of therapy. 9. I understand that I must inform my anesthesiologist when I last ate and/or drank to minimize the risk of anesthesia. 10. If I am pregnant, or may pregnant, I understand that elective surgery should be postponed until after the baby is born. Anesthetics cross the placenta and may temporarily anesthetize the baby. Although fetal complications of anesthesia during pregnancy are rare, they may include birth defects, premature labor, brain damage and death. 11. I certify that I informed the anesthesiologist, to the best of my ability, about medication I take including blood thinners, anticoagulants, herbal remedies, narcotics and recreational drugs (e.g. cocaine, marijuana, PCP). Failure to inform my anesthesiologist about these medicines may increase my risk of anesthetic complications. The nature and purpose of my anesthetic management was explained to me. I had the opportunity to ask questions and the answers and information provided meet my satisfaction.   I retain the right to withdraw this consent at any time prior to the administration of said anesthetic.    ___________________________________________________           _____________________________________________________  Patient Signature                                                                                      Witness Signature                ___________________________________________________           _____________________________________________________  Date/Time                                                                                               Responsible person in case of minor/ unconscious pt /Relationship    My signature below affirms that prior to the time of the procedure, I have explained to the patient and/or his/her guardian, the risks and benefits of undergoing anesthesia, as well as any reasonable alternatives.     ___________________________________________________            _____________________________________________________  Physician Signature                            Date/Time  Patient Name: Juan Pablo Spence     : 3/20/1950     Printed: 2022      Medical Record #: [id number]                              Page 1 of 1    ----------ANESTHESIA CONSENT----------

## (undated) NOTE — LETTER
September 29, 2020    Patricia Bone  [address]  [place] [address]      Dear Hayes Jensen: It was a pleasure speaking with you over the phone recently.  To follow up, I wanted to send you my contact information to utilize when you have a

## (undated) NOTE — ED AVS SNAPSHOT
Taylor Barr   MRN: [id number]    Department:  [facility] Emergency Department   Date of Visit:  10/22/2017           Disclosure     Insurance plans vary and the physician(s) referred by the ER may not be covered by your plan.  Please contac CARE PHYSICIAN AT ONCE OR RETURN IMMEDIATELY TO THE EMERGENCY DEPARTMENT. If you have been prescribed any medication(s), please fill your prescription right away and begin taking the medication(s) as directed.   If you believe that any of the medications

## (undated) NOTE — LETTER
[address] [address]      Dear Ahsan Chavez:    Jose A Hyatt had previously enrolled in our Chronic Care Management program and had been speaking with your care manager.  We have since made several attempts to reach you by ariane

## (undated) NOTE — LETTER
[facility]  Administration of Anesthesia  ANGELA WADE agree to be cared for by a physician anesthesiologist alone and/or with a nurse anesthetist, who is specially trained to monitor me and give me medicine to put me to sleep or keep me comfortable during my procedure    I understand that my anesthesiologist and/or anesthetist is not an employee or agent of [facility] or [employer]. He or she works for [facility]    As the patient asking for anesthesia services, I agree to:  Allow the anesthesiologist (anesthesia doctor) to give me medicine and do additional procedures as necessary. Some examples are: Starting or using an “IV” to give me medicine, fluids or blood during my procedure, and having a breathing tube placed to help me breathe when I’m asleep (intubation). In the event that my heart stops working properly, I understand that my anesthesiologist will make every effort to sustain my life, unless otherwise directed by [facility] Do Not Resuscitate documents.  Tell my anesthesia doctor before my procedure:  If I am pregnant.  The last time that I ate or drank.  iii. All of the medicines I take (including prescriptions, herbal supplements, and pills I can buy without a prescription (including street drugs/illegal medications). Failure to inform my anesthesiologist about these medicines may increase my risk of anesthetic complications.  iv.If I am allergic to anything or have had a reaction to anesthesia before.  I understand how the anesthesia medicine will help me (benefits).  I understand that with any type of anesthesia medicine there are risks:  The most common risks are: nausea, vomiting, sore throat, muscle soreness, damage to my eyes, mouth, or teeth (from breathing tube placement).  Rare risks include: remembering what happened during my procedure, allergic reactions to medications, injury to my airway, heart, lungs, vision, nerves, or  muscles and in extremely rare instances death.  My doctor has explained to me other choices available to me for my care (alternatives).  Pregnant Patients (“epidural”):  I understand that the risks of having an epidural (medicine given into my back to help control pain during labor), include itching, low blood pressure, difficulty urinating, headache or slowing of the baby’s heart. Very rare risks include infection, bleeding, seizure, irregular heart rhythms and nerve injury.  Regional Anesthesia (“spinal”, “epidural”, & “nerve blocks”):  I understand that rare but potential complications include headache, bleeding, infection, seizure, irregular heart rhythms, and nerve injury.    _____________________________________________________________________________  Patient (or Representative) Signature/Relationship to Patient  Date   Time    _____________________________________________________________________________   Name (if used)    Language/Organization   Time    _____________________________________________________________________________  Nurse Anesthetist Signature     Date   Time  _____________________________________________________________________________  Anesthesiologist Signature     Date   Time  I have discussed the procedure and information above with the patient (or patient’s representative) and answered their questions. The patient or their representative has agreed to have anesthesia services.    _____________________________________________________________________________  Witness        Date   Time  I have verified that the signature is that of the patient or patient’s representative, and that it was signed before the procedure  Patient Name: ANGELA Cha     : 3/20/1950                 Printed: 2024 at 7:32 AM    Medical Record #: [id number]                                            Page 1 of 1  ----------ANESTHESIA CONSENT----------